# Patient Record
Sex: FEMALE | Race: WHITE | URBAN - METROPOLITAN AREA
[De-identification: names, ages, dates, MRNs, and addresses within clinical notes are randomized per-mention and may not be internally consistent; named-entity substitution may affect disease eponyms.]

---

## 2017-01-06 ENCOUNTER — TELEPHONE (OUTPATIENT)
Dept: FAMILY MEDICINE CLINIC | Age: 82
End: 2017-01-06

## 2017-01-26 ENCOUNTER — HOSPITAL ENCOUNTER (OUTPATIENT)
Dept: WOMENS IMAGING | Age: 82
Discharge: HOME | End: 2017-01-26
Attending: FAMILY MEDICINE | Admitting: FAMILY MEDICINE

## 2017-01-26 DIAGNOSIS — Z12.31 VISIT FOR SCREENING MAMMOGRAM: ICD-10-CM

## 2017-02-09 ENCOUNTER — OFFICE VISIT (OUTPATIENT)
Dept: FAMILY MEDICINE CLINIC | Age: 82
End: 2017-02-09

## 2017-02-09 VITALS
HEIGHT: 61 IN | BODY MASS INDEX: 27.56 KG/M2 | SYSTOLIC BLOOD PRESSURE: 144 MMHG | WEIGHT: 146 LBS | HEART RATE: 78 BPM | DIASTOLIC BLOOD PRESSURE: 62 MMHG

## 2017-02-09 DIAGNOSIS — N18.30 CKD (CHRONIC KIDNEY DISEASE), STAGE III (HCC): ICD-10-CM

## 2017-02-09 DIAGNOSIS — Z23 NEED FOR STREPTOCOCCUS PNEUMONIAE VACCINATION: ICD-10-CM

## 2017-02-09 DIAGNOSIS — I10 ESSENTIAL HYPERTENSION, BENIGN: Primary | ICD-10-CM

## 2017-02-09 DIAGNOSIS — E78.00 HYPERCHOLESTEREMIA: ICD-10-CM

## 2017-02-09 LAB
A/G RATIO: 1.6 (ref 1.1–2.2)
ALBUMIN SERPL-MCNC: 4.6 G/DL (ref 3.4–5)
ALP BLD-CCNC: 80 U/L (ref 40–129)
ALT SERPL-CCNC: 18 U/L (ref 10–40)
ANION GAP SERPL CALCULATED.3IONS-SCNC: 17 MMOL/L (ref 3–16)
AST SERPL-CCNC: 32 U/L (ref 15–37)
BILIRUB SERPL-MCNC: 1.7 MG/DL (ref 0–1)
BUN BLDV-MCNC: 21 MG/DL (ref 7–20)
CALCIUM SERPL-MCNC: 10.1 MG/DL (ref 8.3–10.6)
CHLORIDE BLD-SCNC: 95 MMOL/L (ref 99–110)
CHOLESTEROL, TOTAL: 251 MG/DL (ref 0–199)
CO2: 26 MMOL/L (ref 21–32)
CREAT SERPL-MCNC: 0.9 MG/DL (ref 0.6–1.2)
GFR AFRICAN AMERICAN: >60
GFR NON-AFRICAN AMERICAN: >60
GLOBULIN: 2.8 G/DL
GLUCOSE BLD-MCNC: 84 MG/DL (ref 70–99)
HDLC SERPL-MCNC: 86 MG/DL (ref 40–60)
LDL CHOLESTEROL CALCULATED: 141 MG/DL
POTASSIUM SERPL-SCNC: 4 MMOL/L (ref 3.5–5.1)
SODIUM BLD-SCNC: 138 MMOL/L (ref 136–145)
TOTAL PROTEIN: 7.4 G/DL (ref 6.4–8.2)
TRIGL SERPL-MCNC: 120 MG/DL (ref 0–150)
VLDLC SERPL CALC-MCNC: 24 MG/DL

## 2017-02-09 PROCEDURE — 1123F ACP DISCUSS/DSCN MKR DOCD: CPT | Performed by: FAMILY MEDICINE

## 2017-02-09 PROCEDURE — G8484 FLU IMMUNIZE NO ADMIN: HCPCS | Performed by: FAMILY MEDICINE

## 2017-02-09 PROCEDURE — 99214 OFFICE O/P EST MOD 30 MIN: CPT | Performed by: FAMILY MEDICINE

## 2017-02-09 PROCEDURE — 90732 PPSV23 VACC 2 YRS+ SUBQ/IM: CPT | Performed by: FAMILY MEDICINE

## 2017-02-09 PROCEDURE — 36415 COLL VENOUS BLD VENIPUNCTURE: CPT | Performed by: FAMILY MEDICINE

## 2017-02-09 PROCEDURE — G8420 CALC BMI NORM PARAMETERS: HCPCS | Performed by: FAMILY MEDICINE

## 2017-02-09 PROCEDURE — G8427 DOCREV CUR MEDS BY ELIG CLIN: HCPCS | Performed by: FAMILY MEDICINE

## 2017-02-09 PROCEDURE — 1090F PRES/ABSN URINE INCON ASSESS: CPT | Performed by: FAMILY MEDICINE

## 2017-02-09 PROCEDURE — G8400 PT W/DXA NO RESULTS DOC: HCPCS | Performed by: FAMILY MEDICINE

## 2017-02-09 PROCEDURE — 4040F PNEUMOC VAC/ADMIN/RCVD: CPT | Performed by: FAMILY MEDICINE

## 2017-02-09 PROCEDURE — 1036F TOBACCO NON-USER: CPT | Performed by: FAMILY MEDICINE

## 2017-02-09 PROCEDURE — 90471 IMMUNIZATION ADMIN: CPT | Performed by: FAMILY MEDICINE

## 2017-02-13 ENCOUNTER — TELEPHONE (OUTPATIENT)
Dept: FAMILY MEDICINE CLINIC | Age: 82
End: 2017-02-13

## 2017-02-13 DIAGNOSIS — E78.00 HYPERCHOLESTEREMIA: ICD-10-CM

## 2017-02-13 RX ORDER — SIMVASTATIN 20 MG
20 TABLET ORAL EVERY EVENING
Qty: 90 TABLET | Refills: 3 | Status: SHIPPED | OUTPATIENT
Start: 2017-02-13 | End: 2017-09-25 | Stop reason: SDUPTHER

## 2017-02-14 ENCOUNTER — TELEPHONE (OUTPATIENT)
Dept: FAMILY MEDICINE CLINIC | Age: 82
End: 2017-02-14

## 2017-03-28 ENCOUNTER — TELEPHONE (OUTPATIENT)
Dept: FAMILY MEDICINE CLINIC | Age: 82
End: 2017-03-28

## 2017-04-05 ENCOUNTER — OFFICE VISIT (OUTPATIENT)
Dept: ORTHOPEDIC SURGERY | Age: 82
End: 2017-04-05

## 2017-04-05 VITALS
HEART RATE: 77 BPM | HEIGHT: 61 IN | DIASTOLIC BLOOD PRESSURE: 77 MMHG | SYSTOLIC BLOOD PRESSURE: 127 MMHG | WEIGHT: 150 LBS | BODY MASS INDEX: 28.32 KG/M2

## 2017-04-05 DIAGNOSIS — M75.42 IMPINGEMENT SYNDROME OF LEFT SHOULDER: Primary | ICD-10-CM

## 2017-04-05 PROCEDURE — G8427 DOCREV CUR MEDS BY ELIG CLIN: HCPCS | Performed by: ORTHOPAEDIC SURGERY

## 2017-04-05 PROCEDURE — 73030 X-RAY EXAM OF SHOULDER: CPT | Performed by: ORTHOPAEDIC SURGERY

## 2017-04-05 PROCEDURE — G8420 CALC BMI NORM PARAMETERS: HCPCS | Performed by: ORTHOPAEDIC SURGERY

## 2017-04-05 PROCEDURE — 1090F PRES/ABSN URINE INCON ASSESS: CPT | Performed by: ORTHOPAEDIC SURGERY

## 2017-04-05 PROCEDURE — G8400 PT W/DXA NO RESULTS DOC: HCPCS | Performed by: ORTHOPAEDIC SURGERY

## 2017-04-05 PROCEDURE — 20610 DRAIN/INJ JOINT/BURSA W/O US: CPT | Performed by: ORTHOPAEDIC SURGERY

## 2017-04-05 PROCEDURE — 1036F TOBACCO NON-USER: CPT | Performed by: ORTHOPAEDIC SURGERY

## 2017-04-05 PROCEDURE — 1123F ACP DISCUSS/DSCN MKR DOCD: CPT | Performed by: ORTHOPAEDIC SURGERY

## 2017-04-05 PROCEDURE — 99214 OFFICE O/P EST MOD 30 MIN: CPT | Performed by: ORTHOPAEDIC SURGERY

## 2017-04-05 PROCEDURE — 4040F PNEUMOC VAC/ADMIN/RCVD: CPT | Performed by: ORTHOPAEDIC SURGERY

## 2017-04-21 ENCOUNTER — TELEPHONE (OUTPATIENT)
Dept: ORTHOPEDIC SURGERY | Age: 82
End: 2017-04-21

## 2017-04-21 DIAGNOSIS — M75.42 IMPINGEMENT SYNDROME OF LEFT SHOULDER: Primary | ICD-10-CM

## 2017-05-03 ENCOUNTER — HOSPITAL ENCOUNTER (OUTPATIENT)
Dept: PHYSICAL THERAPY | Age: 82
Discharge: HOME | End: 2017-05-31
Attending: ORTHOPAEDIC SURGERY | Admitting: ORTHOPAEDIC SURGERY

## 2017-05-03 ASSESSMENT — PAIN DESCRIPTION - DESCRIPTORS: DESCRIPTORS: ACHING;SHOOTING;SORE;SHARP;CONSTANT

## 2017-05-03 ASSESSMENT — PAIN DESCRIPTION - PROGRESSION: CLINICAL_PROGRESSION: NOT CHANGED

## 2017-05-03 ASSESSMENT — PAIN DESCRIPTION - ONSET: ONSET: ON-GOING

## 2017-05-03 ASSESSMENT — PAIN DESCRIPTION - PAIN TYPE: TYPE: ACUTE PAIN

## 2017-05-03 ASSESSMENT — PAIN DESCRIPTION - LOCATION: LOCATION: SHOULDER

## 2017-05-03 ASSESSMENT — PAIN SCALES - GENERAL: PAINLEVEL_OUTOF10: 7

## 2017-05-03 ASSESSMENT — PAIN DESCRIPTION - ORIENTATION: ORIENTATION: LEFT

## 2017-05-03 ASSESSMENT — PAIN DESCRIPTION - DIRECTION: RADIATING_TOWARDS: DOWN ARM

## 2017-05-03 ASSESSMENT — PAIN DESCRIPTION - FREQUENCY: FREQUENCY: CONTINUOUS

## 2017-05-12 ENCOUNTER — HOSPITAL ENCOUNTER (OUTPATIENT)
Dept: PHYSICAL THERAPY | Age: 82
Discharge: HOME | End: 2017-05-13
Admitting: ORTHOPAEDIC SURGERY

## 2017-05-16 ENCOUNTER — HOSPITAL ENCOUNTER (OUTPATIENT)
Dept: PHYSICAL THERAPY | Age: 82
Discharge: HOME | End: 2017-05-17
Admitting: ORTHOPAEDIC SURGERY

## 2017-05-17 ENCOUNTER — OFFICE VISIT (OUTPATIENT)
Dept: ORTHOPEDIC SURGERY | Age: 82
End: 2017-05-17

## 2017-05-17 VITALS
HEIGHT: 61 IN | HEART RATE: 82 BPM | DIASTOLIC BLOOD PRESSURE: 65 MMHG | RESPIRATION RATE: 16 BRPM | WEIGHT: 150 LBS | BODY MASS INDEX: 28.32 KG/M2 | SYSTOLIC BLOOD PRESSURE: 114 MMHG

## 2017-05-17 DIAGNOSIS — M75.41 ROTATOR CUFF IMPINGEMENT SYNDROME OF RIGHT SHOULDER: Primary | ICD-10-CM

## 2017-05-17 DIAGNOSIS — S40.011A CONTUSION OF RIGHT SHOULDER, INITIAL ENCOUNTER: ICD-10-CM

## 2017-05-17 PROCEDURE — 99214 OFFICE O/P EST MOD 30 MIN: CPT | Performed by: ORTHOPAEDIC SURGERY

## 2017-05-17 PROCEDURE — 20610 DRAIN/INJ JOINT/BURSA W/O US: CPT | Performed by: ORTHOPAEDIC SURGERY

## 2017-05-30 DIAGNOSIS — I10 ESSENTIAL HYPERTENSION, BENIGN: ICD-10-CM

## 2017-05-30 RX ORDER — LOSARTAN POTASSIUM AND HYDROCHLOROTHIAZIDE 12.5; 5 MG/1; MG/1
TABLET ORAL
Qty: 90 TABLET | Refills: 3 | Status: SHIPPED | OUTPATIENT
Start: 2017-05-30 | End: 2017-09-25 | Stop reason: SDUPTHER

## 2017-05-30 RX ORDER — AMLODIPINE BESYLATE 5 MG/1
TABLET ORAL
Qty: 90 TABLET | Refills: 3 | Status: SHIPPED | OUTPATIENT
Start: 2017-05-30 | End: 2017-09-25 | Stop reason: SDUPTHER

## 2017-06-12 ENCOUNTER — TELEPHONE (OUTPATIENT)
Dept: FAMILY MEDICINE CLINIC | Age: 82
End: 2017-06-12

## 2017-06-12 DIAGNOSIS — N39.0 URINARY TRACT INFECTION, SITE UNSPECIFIED: Primary | ICD-10-CM

## 2017-06-12 RX ORDER — SULFAMETHOXAZOLE AND TRIMETHOPRIM 800; 160 MG/1; MG/1
1 TABLET ORAL 2 TIMES DAILY
Qty: 14 TABLET | Refills: 0 | Status: SHIPPED | OUTPATIENT
Start: 2017-06-12 | End: 2017-06-19

## 2017-08-28 ENCOUNTER — OFFICE VISIT (OUTPATIENT)
Dept: FAMILY MEDICINE CLINIC | Age: 82
End: 2017-08-28

## 2017-08-28 VITALS
BODY MASS INDEX: 26.64 KG/M2 | HEIGHT: 61 IN | WEIGHT: 141.1 LBS | SYSTOLIC BLOOD PRESSURE: 131 MMHG | HEART RATE: 80 BPM | DIASTOLIC BLOOD PRESSURE: 78 MMHG

## 2017-08-28 DIAGNOSIS — E78.00 HYPERCHOLESTEREMIA: Primary | ICD-10-CM

## 2017-08-28 DIAGNOSIS — L57.0 ACTINIC KERATOSIS: ICD-10-CM

## 2017-08-28 DIAGNOSIS — I10 ESSENTIAL HYPERTENSION, BENIGN: ICD-10-CM

## 2017-08-28 LAB
A/G RATIO: 1.5 (ref 1.1–2.2)
ALBUMIN SERPL-MCNC: 4.5 G/DL (ref 3.4–5)
ALP BLD-CCNC: 70 U/L (ref 40–129)
ALT SERPL-CCNC: 16 U/L (ref 10–40)
ANION GAP SERPL CALCULATED.3IONS-SCNC: 18 MMOL/L (ref 3–16)
AST SERPL-CCNC: 27 U/L (ref 15–37)
BILIRUB SERPL-MCNC: 1.6 MG/DL (ref 0–1)
BUN BLDV-MCNC: 25 MG/DL (ref 7–20)
CALCIUM SERPL-MCNC: 10.2 MG/DL (ref 8.3–10.6)
CHLORIDE BLD-SCNC: 94 MMOL/L (ref 99–110)
CHOLESTEROL, TOTAL: 196 MG/DL (ref 0–199)
CO2: 26 MMOL/L (ref 21–32)
CREAT SERPL-MCNC: 0.9 MG/DL (ref 0.6–1.2)
GFR AFRICAN AMERICAN: >60
GFR NON-AFRICAN AMERICAN: >60
GLOBULIN: 3.1 G/DL
GLUCOSE BLD-MCNC: 89 MG/DL (ref 70–99)
HDLC SERPL-MCNC: 96 MG/DL (ref 40–60)
LDL CHOLESTEROL CALCULATED: 84 MG/DL
POTASSIUM SERPL-SCNC: 3.4 MMOL/L (ref 3.5–5.1)
SODIUM BLD-SCNC: 138 MMOL/L (ref 136–145)
TOTAL PROTEIN: 7.6 G/DL (ref 6.4–8.2)
TRIGL SERPL-MCNC: 80 MG/DL (ref 0–150)
VLDLC SERPL CALC-MCNC: 16 MG/DL

## 2017-08-28 PROCEDURE — G8400 PT W/DXA NO RESULTS DOC: HCPCS | Performed by: FAMILY MEDICINE

## 2017-08-28 PROCEDURE — 1123F ACP DISCUSS/DSCN MKR DOCD: CPT | Performed by: FAMILY MEDICINE

## 2017-08-28 PROCEDURE — 99214 OFFICE O/P EST MOD 30 MIN: CPT | Performed by: FAMILY MEDICINE

## 2017-08-28 PROCEDURE — G8427 DOCREV CUR MEDS BY ELIG CLIN: HCPCS | Performed by: FAMILY MEDICINE

## 2017-08-28 PROCEDURE — 17000 DESTRUCT PREMALG LESION: CPT | Performed by: FAMILY MEDICINE

## 2017-08-28 PROCEDURE — G8419 CALC BMI OUT NRM PARAM NOF/U: HCPCS | Performed by: FAMILY MEDICINE

## 2017-08-28 PROCEDURE — 4040F PNEUMOC VAC/ADMIN/RCVD: CPT | Performed by: FAMILY MEDICINE

## 2017-08-28 PROCEDURE — 36415 COLL VENOUS BLD VENIPUNCTURE: CPT | Performed by: FAMILY MEDICINE

## 2017-08-28 PROCEDURE — 1090F PRES/ABSN URINE INCON ASSESS: CPT | Performed by: FAMILY MEDICINE

## 2017-08-28 PROCEDURE — 1036F TOBACCO NON-USER: CPT | Performed by: FAMILY MEDICINE

## 2017-08-29 ENCOUNTER — TELEPHONE (OUTPATIENT)
Dept: FAMILY MEDICINE CLINIC | Age: 82
End: 2017-08-29

## 2017-08-30 ENCOUNTER — OFFICE VISIT (OUTPATIENT)
Dept: ORTHOPEDIC SURGERY | Age: 82
End: 2017-08-30

## 2017-08-30 VITALS
SYSTOLIC BLOOD PRESSURE: 136 MMHG | RESPIRATION RATE: 16 BRPM | HEART RATE: 78 BPM | HEIGHT: 61 IN | BODY MASS INDEX: 26.62 KG/M2 | DIASTOLIC BLOOD PRESSURE: 84 MMHG | WEIGHT: 141 LBS

## 2017-08-30 DIAGNOSIS — M75.41 ROTATOR CUFF IMPINGEMENT SYNDROME OF RIGHT SHOULDER: Primary | ICD-10-CM

## 2017-08-30 DIAGNOSIS — M75.121 COMPLETE TEAR OF RIGHT ROTATOR CUFF: ICD-10-CM

## 2017-08-30 PROCEDURE — 99214 OFFICE O/P EST MOD 30 MIN: CPT | Performed by: ORTHOPAEDIC SURGERY

## 2017-08-30 PROCEDURE — 20610 DRAIN/INJ JOINT/BURSA W/O US: CPT | Performed by: ORTHOPAEDIC SURGERY

## 2017-09-13 ENCOUNTER — TELEPHONE (OUTPATIENT)
Dept: FAMILY MEDICINE CLINIC | Age: 82
End: 2017-09-13

## 2017-09-14 ENCOUNTER — OFFICE VISIT (OUTPATIENT)
Dept: FAMILY MEDICINE CLINIC | Age: 82
End: 2017-09-14

## 2017-09-14 VITALS — HEART RATE: 88 BPM | RESPIRATION RATE: 18 BRPM

## 2017-09-14 DIAGNOSIS — B02.9 HERPES ZOSTER WITHOUT COMPLICATION: Primary | ICD-10-CM

## 2017-09-14 DIAGNOSIS — J01.90 ACUTE BACTERIAL SINUSITIS: ICD-10-CM

## 2017-09-14 DIAGNOSIS — B96.89 ACUTE BACTERIAL SINUSITIS: ICD-10-CM

## 2017-09-14 PROCEDURE — 99213 OFFICE O/P EST LOW 20 MIN: CPT | Performed by: FAMILY MEDICINE

## 2017-09-14 PROCEDURE — G8428 CUR MEDS NOT DOCUMENT: HCPCS | Performed by: FAMILY MEDICINE

## 2017-09-14 PROCEDURE — G8400 PT W/DXA NO RESULTS DOC: HCPCS | Performed by: FAMILY MEDICINE

## 2017-09-14 PROCEDURE — 1036F TOBACCO NON-USER: CPT | Performed by: FAMILY MEDICINE

## 2017-09-14 PROCEDURE — 1123F ACP DISCUSS/DSCN MKR DOCD: CPT | Performed by: FAMILY MEDICINE

## 2017-09-14 PROCEDURE — 4040F PNEUMOC VAC/ADMIN/RCVD: CPT | Performed by: FAMILY MEDICINE

## 2017-09-14 PROCEDURE — G8417 CALC BMI ABV UP PARAM F/U: HCPCS | Performed by: FAMILY MEDICINE

## 2017-09-14 PROCEDURE — 1090F PRES/ABSN URINE INCON ASSESS: CPT | Performed by: FAMILY MEDICINE

## 2017-09-14 RX ORDER — GABAPENTIN 600 MG/1
600 TABLET ORAL 3 TIMES DAILY
Qty: 90 TABLET | Refills: 0 | Status: SHIPPED | OUTPATIENT
Start: 2017-09-14 | End: 2017-12-01

## 2017-09-14 RX ORDER — METHYLPREDNISOLONE 4 MG/1
TABLET ORAL
Qty: 1 KIT | Refills: 0 | Status: SHIPPED | OUTPATIENT
Start: 2017-09-14 | End: 2017-12-01

## 2017-09-14 RX ORDER — ACYCLOVIR 800 MG/1
800 TABLET ORAL
Qty: 35 TABLET | Refills: 0 | Status: SHIPPED | OUTPATIENT
Start: 2017-09-14 | End: 2017-09-21

## 2017-09-14 RX ORDER — AMOXICILLIN AND CLAVULANATE POTASSIUM 875; 125 MG/1; MG/1
1 TABLET, FILM COATED ORAL 2 TIMES DAILY
Qty: 14 TABLET | Refills: 0 | Status: SHIPPED | OUTPATIENT
Start: 2017-09-14 | End: 2017-09-21

## 2017-09-25 ENCOUNTER — OFFICE VISIT (OUTPATIENT)
Dept: FAMILY MEDICINE CLINIC | Age: 82
End: 2017-09-25

## 2017-09-25 VITALS
DIASTOLIC BLOOD PRESSURE: 77 MMHG | BODY MASS INDEX: 26.62 KG/M2 | HEART RATE: 100 BPM | WEIGHT: 141 LBS | HEIGHT: 61 IN | SYSTOLIC BLOOD PRESSURE: 129 MMHG

## 2017-09-25 DIAGNOSIS — B02.23 POST-HERPETIC POLYNEUROPATHY: Primary | ICD-10-CM

## 2017-09-25 DIAGNOSIS — I10 ESSENTIAL HYPERTENSION, BENIGN: ICD-10-CM

## 2017-09-25 DIAGNOSIS — E78.00 HYPERCHOLESTEREMIA: ICD-10-CM

## 2017-09-25 PROCEDURE — 1036F TOBACCO NON-USER: CPT | Performed by: FAMILY MEDICINE

## 2017-09-25 PROCEDURE — G8427 DOCREV CUR MEDS BY ELIG CLIN: HCPCS | Performed by: FAMILY MEDICINE

## 2017-09-25 PROCEDURE — 1090F PRES/ABSN URINE INCON ASSESS: CPT | Performed by: FAMILY MEDICINE

## 2017-09-25 PROCEDURE — G8417 CALC BMI ABV UP PARAM F/U: HCPCS | Performed by: FAMILY MEDICINE

## 2017-09-25 PROCEDURE — G8400 PT W/DXA NO RESULTS DOC: HCPCS | Performed by: FAMILY MEDICINE

## 2017-09-25 PROCEDURE — 99213 OFFICE O/P EST LOW 20 MIN: CPT | Performed by: FAMILY MEDICINE

## 2017-09-25 PROCEDURE — 4040F PNEUMOC VAC/ADMIN/RCVD: CPT | Performed by: FAMILY MEDICINE

## 2017-09-25 PROCEDURE — 1123F ACP DISCUSS/DSCN MKR DOCD: CPT | Performed by: FAMILY MEDICINE

## 2017-09-26 RX ORDER — AMLODIPINE BESYLATE 5 MG/1
TABLET ORAL
Qty: 90 TABLET | Refills: 3 | Status: SHIPPED | OUTPATIENT
Start: 2017-09-26 | End: 2018-10-08 | Stop reason: SDUPTHER

## 2017-09-26 RX ORDER — LOSARTAN POTASSIUM AND HYDROCHLOROTHIAZIDE 12.5; 5 MG/1; MG/1
TABLET ORAL
Qty: 90 TABLET | Refills: 3 | Status: SHIPPED | OUTPATIENT
Start: 2017-09-26 | End: 2018-10-08 | Stop reason: SDUPTHER

## 2017-09-26 RX ORDER — SIMVASTATIN 20 MG
20 TABLET ORAL EVERY EVENING
Qty: 90 TABLET | Refills: 3 | Status: SHIPPED | OUTPATIENT
Start: 2017-09-26 | End: 2018-10-08 | Stop reason: SDUPTHER

## 2017-12-01 ENCOUNTER — OFFICE VISIT (OUTPATIENT)
Dept: FAMILY MEDICINE CLINIC | Age: 82
End: 2017-12-01

## 2017-12-01 VITALS
BODY MASS INDEX: 26.62 KG/M2 | HEART RATE: 83 BPM | DIASTOLIC BLOOD PRESSURE: 82 MMHG | SYSTOLIC BLOOD PRESSURE: 132 MMHG | WEIGHT: 141 LBS | HEIGHT: 61 IN

## 2017-12-01 DIAGNOSIS — E78.00 HYPERCHOLESTEREMIA: Primary | ICD-10-CM

## 2017-12-01 DIAGNOSIS — J30.1 CHRONIC SEASONAL ALLERGIC RHINITIS DUE TO POLLEN: ICD-10-CM

## 2017-12-01 DIAGNOSIS — I10 ESSENTIAL HYPERTENSION, BENIGN: ICD-10-CM

## 2017-12-01 PROCEDURE — 4040F PNEUMOC VAC/ADMIN/RCVD: CPT | Performed by: FAMILY MEDICINE

## 2017-12-01 PROCEDURE — G8484 FLU IMMUNIZE NO ADMIN: HCPCS | Performed by: FAMILY MEDICINE

## 2017-12-01 PROCEDURE — 1090F PRES/ABSN URINE INCON ASSESS: CPT | Performed by: FAMILY MEDICINE

## 2017-12-01 PROCEDURE — 99214 OFFICE O/P EST MOD 30 MIN: CPT | Performed by: FAMILY MEDICINE

## 2017-12-01 PROCEDURE — 1123F ACP DISCUSS/DSCN MKR DOCD: CPT | Performed by: FAMILY MEDICINE

## 2017-12-01 PROCEDURE — G8400 PT W/DXA NO RESULTS DOC: HCPCS | Performed by: FAMILY MEDICINE

## 2017-12-01 PROCEDURE — G8417 CALC BMI ABV UP PARAM F/U: HCPCS | Performed by: FAMILY MEDICINE

## 2017-12-01 PROCEDURE — 1036F TOBACCO NON-USER: CPT | Performed by: FAMILY MEDICINE

## 2017-12-01 PROCEDURE — G8427 DOCREV CUR MEDS BY ELIG CLIN: HCPCS | Performed by: FAMILY MEDICINE

## 2017-12-01 NOTE — PROGRESS NOTES
lb (64 kg)   08/30/17 141 lb (64 kg)       WDWN in NAD  Lungs: CTAB BS Equal and Easy, no accessory muscle use  CV: RRR w/o M,R,G, PP2+, no edema  Abd:  BS+, S, ND, NT, no hsm  Psych: Judgement and insight are intact, Nl Speech and motor activity, no KALA, no FOI, dressed casually in street clothes          Chemistry        Component Value Date/Time     08/28/2017 1314    K 3.4 (L) 08/28/2017 1314    CL 94 (L) 08/28/2017 1314    CO2 26 08/28/2017 1314    BUN 25 (H) 08/28/2017 1314    CREATININE 0.9 08/28/2017 1314        Component Value Date/Time    CALCIUM 10.2 08/28/2017 1314    ALKPHOS 70 08/28/2017 1314    AST 27 08/28/2017 1314    ALT 16 08/28/2017 1314    BILITOT 1.6 (H) 08/28/2017 1314          Lab Results   Component Value Date    WBC 7.1 07/15/2015    HGB 13.0 07/15/2015    HCT 39.2 07/15/2015    MCV 96.4 07/15/2015     07/15/2015     No results found for: LABA1C  No results found for: EAG  No results found for: LABA1C  No components found for: CHLPL  Lab Results   Component Value Date    TRIG 80 08/28/2017    TRIG 120 02/09/2017    TRIG 106 01/21/2016     Lab Results   Component Value Date    HDL 96 (H) 08/28/2017    HDL 86 (H) 02/09/2017    HDL 83 (H) 01/21/2016     Lab Results   Component Value Date    LDLCALC 84 08/28/2017    LDLCALC 141 (H) 02/09/2017    LDLCALC 91 01/21/2016     Lab Results   Component Value Date    LABVLDL 16 08/28/2017    LABVLDL 24 02/09/2017    LABVLDL 21 01/21/2016         Assessment   Plan     1. Hypercholesteremia  Controlled:  Chronic and persistent: continue meds and follow. 2. Essential hypertension, benign  Controlled:   Chronic and persistent: continue meds and follow. 3. Chronic seasonal allergic rhinitis due to pollen  Stable, continue to montior and f/u prn. Discussed use, benefit, and side effects of prescribed medications. Barriers to medication compliance addressed. All patient questions answered. Pt voiced understanding.        RTC in 3

## 2018-01-18 ENCOUNTER — HOSPITAL ENCOUNTER (OUTPATIENT)
Dept: WOMENS IMAGING | Age: 83
Discharge: HOME | End: 2018-01-18
Attending: FAMILY MEDICINE | Admitting: FAMILY MEDICINE

## 2018-01-18 DIAGNOSIS — Z12.31 VISIT FOR SCREENING MAMMOGRAM: ICD-10-CM

## 2018-02-02 ENCOUNTER — TELEPHONE (OUTPATIENT)
Dept: FAMILY MEDICINE CLINIC | Age: 83
End: 2018-02-02

## 2018-02-02 NOTE — TELEPHONE ENCOUNTER
Pt called said that there is an issue with [employer] and her simvastatin Rx. Pls call patient back so that she can discuss.

## 2018-02-20 ENCOUNTER — TELEPHONE (OUTPATIENT)
Dept: FAMILY MEDICINE CLINIC | Age: 83
End: 2018-02-20

## 2018-02-27 NOTE — TELEPHONE ENCOUNTER
Dr. Rodriguez Blind: This patient has an upcoming appointment with you for Hyperlipidemia. In planning for that visit I have completed the following pre-visit planning:     Pre-Visit Planning Checklist:  Patient contacted: yes  Verified patient by name and date of birth: yes    Health Maintenance items reviewed:    No pre-visit planning health maintenance topics to review at this time    Labs and procedures pended:     Labs and procedures discussed with patient: yes  Reminded patient to check with their insurance company about coverage for lab tests and lab location: yes    Preliminary Medication Reconciliation: was performed. Reminded patient to bring medications to appointment: no    Reminded patient to arrive early: yes    Other notes: Patient has new insurance since retiring. Needs help in finding lab. [facility] not covered and costs more than she can pay    Please complete the med-reconciliation and sign the appropriate labs as soon as possible.       Dominique Hassan, [address] [facility]  Pre-Services Specialist

## 2018-03-01 ENCOUNTER — OFFICE VISIT (OUTPATIENT)
Dept: FAMILY MEDICINE CLINIC | Age: 83
End: 2018-03-01

## 2018-03-01 VITALS
DIASTOLIC BLOOD PRESSURE: 76 MMHG | WEIGHT: 139 LBS | HEART RATE: 79 BPM | BODY MASS INDEX: 25.58 KG/M2 | SYSTOLIC BLOOD PRESSURE: 127 MMHG | HEIGHT: 62 IN

## 2018-03-01 DIAGNOSIS — I10 ESSENTIAL HYPERTENSION, BENIGN: Primary | ICD-10-CM

## 2018-03-01 DIAGNOSIS — E78.00 HYPERCHOLESTEREMIA: ICD-10-CM

## 2018-03-01 PROCEDURE — G8417 CALC BMI ABV UP PARAM F/U: HCPCS | Performed by: FAMILY MEDICINE

## 2018-03-01 PROCEDURE — G8484 FLU IMMUNIZE NO ADMIN: HCPCS | Performed by: FAMILY MEDICINE

## 2018-03-01 PROCEDURE — 1090F PRES/ABSN URINE INCON ASSESS: CPT | Performed by: FAMILY MEDICINE

## 2018-03-01 PROCEDURE — 99214 OFFICE O/P EST MOD 30 MIN: CPT | Performed by: FAMILY MEDICINE

## 2018-03-01 PROCEDURE — G8427 DOCREV CUR MEDS BY ELIG CLIN: HCPCS | Performed by: FAMILY MEDICINE

## 2018-03-01 PROCEDURE — 1036F TOBACCO NON-USER: CPT | Performed by: FAMILY MEDICINE

## 2018-03-01 PROCEDURE — G8400 PT W/DXA NO RESULTS DOC: HCPCS | Performed by: FAMILY MEDICINE

## 2018-03-01 PROCEDURE — 4040F PNEUMOC VAC/ADMIN/RCVD: CPT | Performed by: FAMILY MEDICINE

## 2018-03-01 PROCEDURE — 1123F ACP DISCUSS/DSCN MKR DOCD: CPT | Performed by: FAMILY MEDICINE

## 2018-03-01 ASSESSMENT — PATIENT HEALTH QUESTIONNAIRE - PHQ9
SUM OF ALL RESPONSES TO PHQ QUESTIONS 1-9: 0
1. LITTLE INTEREST OR PLEASURE IN DOING THINGS: 0
2. FEELING DOWN, DEPRESSED OR HOPELESS: 0
SUM OF ALL RESPONSES TO PHQ9 QUESTIONS 1 & 2: 0

## 2018-03-01 ASSESSMENT — ENCOUNTER SYMPTOMS
NAUSEA: 0
SHORTNESS OF BREATH: 0
CONSTIPATION: 0
DIARRHEA: 0
ABDOMINAL PAIN: 0
VOMITING: 0
COUGH: 0

## 2018-03-12 ENCOUNTER — NURSE ONLY (OUTPATIENT)
Dept: FAMILY MEDICINE CLINIC | Age: 83
End: 2018-03-12

## 2018-03-12 DIAGNOSIS — I10 ESSENTIAL HYPERTENSION, BENIGN: Primary | ICD-10-CM

## 2018-03-12 LAB
A/G RATIO: 1.6 (ref 1.1–2.2)
ALBUMIN SERPL-MCNC: 4.5 G/DL (ref 3.4–5)
ALP BLD-CCNC: 67 U/L (ref 40–129)
ALT SERPL-CCNC: 15 U/L (ref 10–40)
ANION GAP SERPL CALCULATED.3IONS-SCNC: 13 MMOL/L (ref 3–16)
AST SERPL-CCNC: 26 U/L (ref 15–37)
BILIRUB SERPL-MCNC: 1.4 MG/DL (ref 0–1)
BUN BLDV-MCNC: 22 MG/DL (ref 7–20)
CALCIUM SERPL-MCNC: 9.8 MG/DL (ref 8.3–10.6)
CHLORIDE BLD-SCNC: 95 MMOL/L (ref 99–110)
CO2: 29 MMOL/L (ref 21–32)
CREAT SERPL-MCNC: 1 MG/DL (ref 0.6–1.2)
GFR AFRICAN AMERICAN: >60
GFR NON-AFRICAN AMERICAN: 53
GLOBULIN: 2.9 G/DL
GLUCOSE BLD-MCNC: 117 MG/DL (ref 70–99)
POTASSIUM SERPL-SCNC: 4.1 MMOL/L (ref 3.5–5.1)
SODIUM BLD-SCNC: 137 MMOL/L (ref 136–145)
TOTAL PROTEIN: 7.4 G/DL (ref 6.4–8.2)

## 2018-03-13 NOTE — PROGRESS NOTES
Call Results (labs or images) were stable. Keep follow up as suggested. Ask patient to sign up for MyChart.
Authorizing Provider   simvastatin (ZOCOR) 20 MG tablet Take 1 tablet by mouth every evening Yes Cindia Gaucher, MD   amLODIPine (NORVASC) 5 MG tablet TAKE ONE TABLET BY MOUTH ONE TIME A DAY Yes Cindia Gaucher, MD   losartan-hydrochlorothiazide (HYZAAR) 50-12.5 MG per tablet TAKE ONE TABLET BY MOUTH ONE TIME A DAY Yes Cindia Gaucher, MD   COENZYME Q-10 PO Take by mouth Yes Historical Provider, MD   Cholecalciferol (VITAMIN D PO) Take 1,000 Units by mouth daily. Yes Historical Provider, MD     Allergies   Allergen Reactions    Morphine      Vomiting      Vicodin [Hydrocodone-Acetaminophen]      Vomiting/nausea    Zetia [Ezetimibe] Itching    Lipitor [Atorvastatin] Other (See Comments)     myalgia       OBJECTIVE:    /76   Pulse 79   Ht 5' 2\" (1.575 m)   Wt 139 lb (63 kg)   BMI 25.42 kg/m²   BP Readings from Last 2 Encounters:   03/01/18 127/76   12/01/17 132/82     Wt Readings from Last 3 Encounters:   03/01/18 139 lb (63 kg)   12/01/17 141 lb (64 kg)   09/25/17 141 lb (64 kg)       Physical Exam   Constitutional: She appears well-developed and well-nourished. Cardiovascular: Normal rate and regular rhythm. Exam reveals no gallop and no friction rub. No murmur heard. Pulmonary/Chest: Effort normal and breath sounds normal. She has no wheezes. She has no rales. Abdominal: Soft. Bowel sounds are normal. She exhibits no distension and no mass. There is no tenderness. Skin: Skin is warm and dry. No rash noted. LDL Calculated (mg/dL)   Date Value   08/28/2017 84       ASSESSMENT/PLAN:    1. Essential hypertension, benign  Controlled: Appears stable. We will continue current management and monitor for adverse reaction and disease progression. Follow-up as noted below    - Comprehensive Metabolic Panel    2. Hypercholesteremia  Controlled: Appears stable. We will continue current management and monitor for adverse reaction and disease progression.   Follow-up as noted below    -

## 2018-04-06 ENCOUNTER — TELEPHONE (OUTPATIENT)
Dept: FAMILY MEDICINE CLINIC | Age: 83
End: 2018-04-06

## 2018-04-06 RX ORDER — MECLIZINE HCL 12.5 MG/1
12.5 TABLET ORAL 3 TIMES DAILY PRN
Qty: 20 TABLET | Refills: 0 | Status: SHIPPED | OUTPATIENT
Start: 2018-04-06 | End: 2018-04-16

## 2018-04-11 ENCOUNTER — OFFICE VISIT (OUTPATIENT)
Dept: ORTHOPEDIC SURGERY | Age: 83
End: 2018-04-11

## 2018-04-11 VITALS
BODY MASS INDEX: 25.58 KG/M2 | HEART RATE: 86 BPM | DIASTOLIC BLOOD PRESSURE: 75 MMHG | HEIGHT: 62 IN | RESPIRATION RATE: 16 BRPM | WEIGHT: 139 LBS | SYSTOLIC BLOOD PRESSURE: 107 MMHG

## 2018-04-11 DIAGNOSIS — M75.42 IMPINGEMENT SYNDROME OF LEFT SHOULDER: ICD-10-CM

## 2018-04-11 DIAGNOSIS — M75.41 ROTATOR CUFF IMPINGEMENT SYNDROME OF RIGHT SHOULDER: Primary | ICD-10-CM

## 2018-04-11 PROCEDURE — 20610 DRAIN/INJ JOINT/BURSA W/O US: CPT | Performed by: ORTHOPAEDIC SURGERY

## 2018-04-11 PROCEDURE — 99214 OFFICE O/P EST MOD 30 MIN: CPT | Performed by: ORTHOPAEDIC SURGERY

## 2018-06-07 ENCOUNTER — OFFICE VISIT (OUTPATIENT)
Dept: FAMILY MEDICINE CLINIC | Age: 83
End: 2018-06-07

## 2018-06-07 VITALS
WEIGHT: 139 LBS | HEART RATE: 85 BPM | BODY MASS INDEX: 25.58 KG/M2 | SYSTOLIC BLOOD PRESSURE: 122 MMHG | DIASTOLIC BLOOD PRESSURE: 78 MMHG | HEIGHT: 62 IN

## 2018-06-07 DIAGNOSIS — E78.00 HYPERCHOLESTEREMIA: Primary | ICD-10-CM

## 2018-06-07 DIAGNOSIS — I10 ESSENTIAL HYPERTENSION, BENIGN: ICD-10-CM

## 2018-06-07 DIAGNOSIS — R00.2 PALPITATION: ICD-10-CM

## 2018-06-07 DIAGNOSIS — R07.9 CHEST PAIN, UNSPECIFIED TYPE: ICD-10-CM

## 2018-06-07 DIAGNOSIS — N18.30 CKD (CHRONIC KIDNEY DISEASE), STAGE III (HCC): ICD-10-CM

## 2018-06-07 PROBLEM — M75.42 IMPINGEMENT SYNDROME OF LEFT SHOULDER: Status: RESOLVED | Noted: 2017-04-05 | Resolved: 2018-06-07

## 2018-06-07 LAB
A/G RATIO: 1.7 (ref 1.1–2.2)
ALBUMIN SERPL-MCNC: 4.8 G/DL (ref 3.4–5)
ALP BLD-CCNC: 67 U/L (ref 40–129)
ALT SERPL-CCNC: 15 U/L (ref 10–40)
ANION GAP SERPL CALCULATED.3IONS-SCNC: 14 MMOL/L (ref 3–16)
AST SERPL-CCNC: 26 U/L (ref 15–37)
BASOPHILS ABSOLUTE: 0 K/UL (ref 0–0.2)
BASOPHILS RELATIVE PERCENT: 0.3 %
BILIRUB SERPL-MCNC: 1.5 MG/DL (ref 0–1)
BUN BLDV-MCNC: 19 MG/DL (ref 7–20)
CALCIUM SERPL-MCNC: 10.5 MG/DL (ref 8.3–10.6)
CHLORIDE BLD-SCNC: 97 MMOL/L (ref 99–110)
CO2: 31 MMOL/L (ref 21–32)
CREAT SERPL-MCNC: 0.9 MG/DL (ref 0.6–1.2)
EOSINOPHILS ABSOLUTE: 0.2 K/UL (ref 0–0.6)
EOSINOPHILS RELATIVE PERCENT: 3 %
GFR AFRICAN AMERICAN: >60
GFR NON-AFRICAN AMERICAN: >60
GLOBULIN: 2.8 G/DL
GLUCOSE BLD-MCNC: 103 MG/DL (ref 70–99)
HCT VFR BLD CALC: 43.4 % (ref 36–48)
HEMOGLOBIN: 14.9 G/DL (ref 12–16)
LYMPHOCYTES ABSOLUTE: 1.9 K/UL (ref 1–5.1)
LYMPHOCYTES RELATIVE PERCENT: 30.6 %
MCH RBC QN AUTO: 32.3 PG (ref 26–34)
MCHC RBC AUTO-ENTMCNC: 34.3 G/DL (ref 31–36)
MCV RBC AUTO: 93.9 FL (ref 80–100)
MONOCYTES ABSOLUTE: 0.5 K/UL (ref 0–1.3)
MONOCYTES RELATIVE PERCENT: 8.1 %
NEUTROPHILS ABSOLUTE: 3.5 K/UL (ref 1.7–7.7)
NEUTROPHILS RELATIVE PERCENT: 58 %
PDW BLD-RTO: 13.7 % (ref 12.4–15.4)
PLATELET # BLD: 302 K/UL (ref 135–450)
PMV BLD AUTO: 8.7 FL (ref 5–10.5)
POTASSIUM SERPL-SCNC: 3.9 MMOL/L (ref 3.5–5.1)
RBC # BLD: 4.62 M/UL (ref 4–5.2)
SODIUM BLD-SCNC: 142 MMOL/L (ref 136–145)
T4 FREE: 1.2 NG/DL (ref 0.9–1.8)
TOTAL PROTEIN: 7.6 G/DL (ref 6.4–8.2)
TSH REFLEX: 5.49 UIU/ML (ref 0.27–4.2)
WBC # BLD: 6.1 K/UL (ref 4–11)

## 2018-06-07 PROCEDURE — 93000 ELECTROCARDIOGRAM COMPLETE: CPT | Performed by: FAMILY MEDICINE

## 2018-06-07 PROCEDURE — 1090F PRES/ABSN URINE INCON ASSESS: CPT | Performed by: FAMILY MEDICINE

## 2018-06-07 PROCEDURE — 36415 COLL VENOUS BLD VENIPUNCTURE: CPT | Performed by: FAMILY MEDICINE

## 2018-06-07 PROCEDURE — G8400 PT W/DXA NO RESULTS DOC: HCPCS | Performed by: FAMILY MEDICINE

## 2018-06-07 PROCEDURE — G8427 DOCREV CUR MEDS BY ELIG CLIN: HCPCS | Performed by: FAMILY MEDICINE

## 2018-06-07 PROCEDURE — 4040F PNEUMOC VAC/ADMIN/RCVD: CPT | Performed by: FAMILY MEDICINE

## 2018-06-07 PROCEDURE — 1123F ACP DISCUSS/DSCN MKR DOCD: CPT | Performed by: FAMILY MEDICINE

## 2018-06-07 PROCEDURE — 99214 OFFICE O/P EST MOD 30 MIN: CPT | Performed by: FAMILY MEDICINE

## 2018-06-07 PROCEDURE — 1036F TOBACCO NON-USER: CPT | Performed by: FAMILY MEDICINE

## 2018-06-07 PROCEDURE — G8417 CALC BMI ABV UP PARAM F/U: HCPCS | Performed by: FAMILY MEDICINE

## 2018-06-07 ASSESSMENT — ENCOUNTER SYMPTOMS
VOMITING: 0
CONSTIPATION: 0
COUGH: 0
DIARRHEA: 0
NAUSEA: 1
ABDOMINAL PAIN: 0
SHORTNESS OF BREATH: 0

## 2018-09-24 ENCOUNTER — OFFICE VISIT (OUTPATIENT)
Dept: FAMILY MEDICINE CLINIC | Age: 83
End: 2018-09-24
Payer: MEDICARE

## 2018-09-24 VITALS
DIASTOLIC BLOOD PRESSURE: 74 MMHG | SYSTOLIC BLOOD PRESSURE: 130 MMHG | WEIGHT: 139 LBS | BODY MASS INDEX: 25.58 KG/M2 | HEIGHT: 62 IN | HEART RATE: 75 BPM

## 2018-09-24 DIAGNOSIS — Z23 NEED FOR VACCINATION: ICD-10-CM

## 2018-09-24 DIAGNOSIS — E78.00 HYPERCHOLESTEREMIA: ICD-10-CM

## 2018-09-24 DIAGNOSIS — N18.30 CKD (CHRONIC KIDNEY DISEASE), STAGE III (HCC): ICD-10-CM

## 2018-09-24 DIAGNOSIS — I10 ESSENTIAL HYPERTENSION, BENIGN: Primary | ICD-10-CM

## 2018-09-24 PROCEDURE — G8427 DOCREV CUR MEDS BY ELIG CLIN: HCPCS | Performed by: FAMILY MEDICINE

## 2018-09-24 PROCEDURE — G0008 ADMIN INFLUENZA VIRUS VAC: HCPCS | Performed by: FAMILY MEDICINE

## 2018-09-24 PROCEDURE — 99214 OFFICE O/P EST MOD 30 MIN: CPT | Performed by: FAMILY MEDICINE

## 2018-09-24 PROCEDURE — G8400 PT W/DXA NO RESULTS DOC: HCPCS | Performed by: FAMILY MEDICINE

## 2018-09-24 PROCEDURE — G8417 CALC BMI ABV UP PARAM F/U: HCPCS | Performed by: FAMILY MEDICINE

## 2018-09-24 PROCEDURE — 4040F PNEUMOC VAC/ADMIN/RCVD: CPT | Performed by: FAMILY MEDICINE

## 2018-09-24 PROCEDURE — 1123F ACP DISCUSS/DSCN MKR DOCD: CPT | Performed by: FAMILY MEDICINE

## 2018-09-24 PROCEDURE — 1036F TOBACCO NON-USER: CPT | Performed by: FAMILY MEDICINE

## 2018-09-24 PROCEDURE — 36415 COLL VENOUS BLD VENIPUNCTURE: CPT | Performed by: FAMILY MEDICINE

## 2018-09-24 PROCEDURE — 90682 RIV4 VACC RECOMBINANT DNA IM: CPT | Performed by: FAMILY MEDICINE

## 2018-09-24 PROCEDURE — 1101F PT FALLS ASSESS-DOCD LE1/YR: CPT | Performed by: FAMILY MEDICINE

## 2018-09-24 PROCEDURE — 1090F PRES/ABSN URINE INCON ASSESS: CPT | Performed by: FAMILY MEDICINE

## 2018-09-24 ASSESSMENT — ENCOUNTER SYMPTOMS
CONSTIPATION: 0
VOMITING: 0
DIARRHEA: 0
ABDOMINAL PAIN: 0
SHORTNESS OF BREATH: 0
NAUSEA: 0
COUGH: 0

## 2018-09-24 NOTE — PROGRESS NOTES
Chief Complaint   Patient presents with    Hypertension    Hyperlipidemia    Chronic Kidney Disease         HPI:  Moses Rhodes is a 82 y.o. (: 1935) here today   for multiple medical problems. She is compliant with her blood pressure medications  without Lightheadedness, Urinary Frequency, Edema and Sedation  She is not checking Home Blood Pressures        She is compliant with her cholesterol medication without myalgia and abdominal pain       She has a chronic history of kidney disease and she drinks a lot of water about 64oz in total.     She is curious about foods, what are ok and what are not ok. Review of Systems   Constitutional: Negative for chills and fever. Respiratory: Negative for cough and shortness of breath. Cardiovascular: Negative for chest pain and palpitations. Gastrointestinal: Negative for abdominal pain, constipation, diarrhea, nausea and vomiting. Endocrine: Negative for polyuria. Genitourinary: Negative for dysuria.        Past Medical History:   Diagnosis Date    Advance directive discussed with patient     Asked to bring in:  tells me she is DNR    Chronic kidney disease (CKD), stage III (moderate)     CKD (chronic kidney disease), stage III 2016    Gout     HTN (hypertension)     Hypercholesteremia      Family History   Problem Relation Age of Onset    Cirrhosis Mother         Blood transfusion: Hep C    Diabetes Mother     Hypertension Mother     Emphysema Father              Social History     Social History    Marital status: Single     Spouse name: Daughter Tez Gonsales    Number of children: 3    Years of education: N/A     Occupational History    cook      Social History Main Topics    Smoking status: Never Smoker    Smokeless tobacco: Never Used    Alcohol use Yes      Comment: Wine nightly    Drug use: No    Sexual activity: Not on file     Other Topics Concern    Not on file     Social History Narrative    No

## 2018-09-25 LAB
A/G RATIO: 1.8 (ref 1.1–2.2)
ALBUMIN SERPL-MCNC: 4.6 G/DL (ref 3.4–5)
ALP BLD-CCNC: 68 U/L (ref 40–129)
ALT SERPL-CCNC: 15 U/L (ref 10–40)
ANION GAP SERPL CALCULATED.3IONS-SCNC: 15 MMOL/L (ref 3–16)
AST SERPL-CCNC: 29 U/L (ref 15–37)
BILIRUB SERPL-MCNC: 1.3 MG/DL (ref 0–1)
BUN BLDV-MCNC: 16 MG/DL (ref 7–20)
CALCIUM SERPL-MCNC: 10.4 MG/DL (ref 8.3–10.6)
CHLORIDE BLD-SCNC: 96 MMOL/L (ref 99–110)
CHOLESTEROL, TOTAL: 179 MG/DL (ref 0–199)
CO2: 28 MMOL/L (ref 21–32)
CREAT SERPL-MCNC: 0.9 MG/DL (ref 0.6–1.2)
GFR AFRICAN AMERICAN: >60
GFR NON-AFRICAN AMERICAN: >60
GLOBULIN: 2.6 G/DL
GLUCOSE BLD-MCNC: 91 MG/DL (ref 70–99)
HDLC SERPL-MCNC: 81 MG/DL (ref 40–60)
LDL CHOLESTEROL CALCULATED: 85 MG/DL
POTASSIUM SERPL-SCNC: 3.6 MMOL/L (ref 3.5–5.1)
SODIUM BLD-SCNC: 139 MMOL/L (ref 136–145)
TOTAL PROTEIN: 7.2 G/DL (ref 6.4–8.2)
TRIGL SERPL-MCNC: 63 MG/DL (ref 0–150)
VLDLC SERPL CALC-MCNC: 13 MG/DL

## 2018-10-01 ENCOUNTER — HOSPITAL ENCOUNTER (OUTPATIENT)
Dept: WOMENS IMAGING | Age: 83
Discharge: HOME | End: 2018-10-01
Payer: MEDICARE

## 2018-10-01 DIAGNOSIS — Z12.31 VISIT FOR SCREENING MAMMOGRAM: ICD-10-CM

## 2018-10-01 PROCEDURE — 77063 BREAST TOMOSYNTHESIS BI: CPT

## 2018-10-08 DIAGNOSIS — I10 ESSENTIAL HYPERTENSION, BENIGN: ICD-10-CM

## 2018-10-08 DIAGNOSIS — E78.00 HYPERCHOLESTEREMIA: ICD-10-CM

## 2018-10-08 RX ORDER — SIMVASTATIN 20 MG
TABLET ORAL
Qty: 90 TABLET | Refills: 3 | Status: SHIPPED | OUTPATIENT
Start: 2018-10-08 | End: 2019-08-15

## 2018-10-08 RX ORDER — LOSARTAN POTASSIUM AND HYDROCHLOROTHIAZIDE 12.5; 5 MG/1; MG/1
TABLET ORAL
Qty: 90 TABLET | Refills: 3 | Status: SHIPPED | OUTPATIENT
Start: 2018-10-08 | End: 2019-08-15

## 2018-10-08 RX ORDER — AMLODIPINE BESYLATE 5 MG/1
TABLET ORAL
Qty: 90 TABLET | Refills: 3 | Status: SHIPPED | OUTPATIENT
Start: 2018-10-08 | End: 2019-05-23 | Stop reason: SDUPTHER

## 2018-10-26 ENCOUNTER — OFFICE VISIT (OUTPATIENT)
Dept: ORTHOPEDIC SURGERY | Age: 83
End: 2018-10-26
Payer: COMMERCIAL

## 2018-10-26 VITALS
SYSTOLIC BLOOD PRESSURE: 128 MMHG | WEIGHT: 139 LBS | HEIGHT: 62 IN | DIASTOLIC BLOOD PRESSURE: 74 MMHG | HEART RATE: 71 BPM | BODY MASS INDEX: 25.58 KG/M2

## 2018-10-26 DIAGNOSIS — M75.41 IMPINGEMENT SYNDROME OF RIGHT SHOULDER: Primary | ICD-10-CM

## 2018-10-26 PROCEDURE — 20610 DRAIN/INJ JOINT/BURSA W/O US: CPT | Performed by: ORTHOPAEDIC SURGERY

## 2018-10-26 PROCEDURE — 99213 OFFICE O/P EST LOW 20 MIN: CPT | Performed by: ORTHOPAEDIC SURGERY

## 2018-10-27 PROBLEM — M75.41 IMPINGEMENT SYNDROME OF RIGHT SHOULDER: Status: ACTIVE | Noted: 2018-10-27

## 2018-10-27 NOTE — PROGRESS NOTES
Comment: Wine nightly    Drug use: No    Sexual activity: Not on file     Other Topics Concern    Not on file     Social History Narrative    No narrative on file       Family History   Problem Relation Age of Onset    Cirrhosis Mother         Blood transfusion: Hep C    Diabetes Mother     Hypertension Mother    Vika Bull Emphysema Father                Current Outpatient Prescriptions on File Prior to Visit   Medication Sig Dispense Refill    losartan-hydrochlorothiazide (HYZAAR) 50-12.5 MG per tablet TAKE ONE TABLET BY MOUTH ONE TIME A DAY 90 tablet 3    simvastatin (ZOCOR) 20 MG tablet TAKE 1 TABLET EVERY EVENING 90 tablet 3    amLODIPine (NORVASC) 5 MG tablet TAKE ONE TABLET BY MOUTH ONE TIME A DAY 90 tablet 3    COENZYME Q-10 PO Take by mouth      Cholecalciferol (VITAMIN D PO) Take 1,000 Units by mouth daily. No current facility-administered medications on file prior to visit. Pertinent items are noted in HPI  Review of systems reviewed from Patient History Form dated on 5/17/2017 and available in the patient's chart under the Media tab. No change noted. PHYSICAL EXAMINATION:  Ms. Cathy Mcwilliams is a very pleasant 82 y.o.  female who presents today in no acute distress, awake, alert, and oriented. She is well dressed, nourished and  groomed. Patient with normal affect. Height is  5' 2\" (1.575 m), weight is 139 lb (63 kg), Body mass index is 25.42 kg/m². Resting respiratory rate is 16. Examination of both upper extremities showing the right shoulder with no swelling. She has mild decrease range of motion of the right elbow and shoulder with minimal discomfort. Right shoulder exam showing that she was able to actively abduct and elevate the right shoulder. She has positive impingement, minimal clicking with no pain. She is not tender over the right shoulder or over the [facility] joint. She has a good radial pulse equally bilaterally. She has intact sensation distally.

## 2019-01-14 ENCOUNTER — OFFICE VISIT (OUTPATIENT)
Dept: FAMILY MEDICINE CLINIC | Age: 84
End: 2019-01-14
Payer: MEDICARE

## 2019-01-14 VITALS
SYSTOLIC BLOOD PRESSURE: 110 MMHG | OXYGEN SATURATION: 96 % | DIASTOLIC BLOOD PRESSURE: 73 MMHG | WEIGHT: 135 LBS | HEART RATE: 93 BPM | RESPIRATION RATE: 16 BRPM | BODY MASS INDEX: 24.84 KG/M2 | HEIGHT: 62 IN

## 2019-01-14 DIAGNOSIS — M75.02 ADHESIVE CAPSULITIS OF LEFT SHOULDER: ICD-10-CM

## 2019-01-14 DIAGNOSIS — I10 ESSENTIAL HYPERTENSION, BENIGN: Primary | ICD-10-CM

## 2019-01-14 DIAGNOSIS — R19.7 DIARRHEA, UNSPECIFIED TYPE: ICD-10-CM

## 2019-01-14 DIAGNOSIS — E78.00 HYPERCHOLESTEREMIA: ICD-10-CM

## 2019-01-14 DIAGNOSIS — Z87.39 PERSONAL HISTORY OF RHEUMATOID ARTHRITIS: ICD-10-CM

## 2019-01-14 DIAGNOSIS — Z80.3 FH: BREAST CANCER: ICD-10-CM

## 2019-01-14 LAB
ALBUMIN SERPL-MCNC: 4.6 G/DL (ref 3.4–5)
ALP BLD-CCNC: 76 U/L (ref 40–129)
ALT SERPL-CCNC: 17 U/L (ref 10–40)
ANION GAP SERPL CALCULATED.3IONS-SCNC: 17 MMOL/L (ref 3–16)
AST SERPL-CCNC: 25 U/L (ref 15–37)
BASOPHILS ABSOLUTE: 0 K/UL (ref 0–0.2)
BASOPHILS RELATIVE PERCENT: 0.4 %
BILIRUB SERPL-MCNC: 1.2 MG/DL (ref 0–1)
BILIRUBIN DIRECT: <0.2 MG/DL (ref 0–0.3)
BILIRUBIN URINE: NEGATIVE
BILIRUBIN, INDIRECT: ABNORMAL MG/DL (ref 0–1)
BLOOD, URINE: NEGATIVE
BUN BLDV-MCNC: 18 MG/DL (ref 7–20)
CALCIUM SERPL-MCNC: 10.3 MG/DL (ref 8.3–10.6)
CHLORIDE BLD-SCNC: 99 MMOL/L (ref 99–110)
CLARITY: CLEAR
CO2: 28 MMOL/L (ref 21–32)
COLOR: YELLOW
CREAT SERPL-MCNC: 0.9 MG/DL (ref 0.6–1.2)
EOSINOPHILS ABSOLUTE: 0.3 K/UL (ref 0–0.6)
EOSINOPHILS RELATIVE PERCENT: 5.5 %
GFR AFRICAN AMERICAN: >60
GFR NON-AFRICAN AMERICAN: >60
GLUCOSE BLD-MCNC: 92 MG/DL (ref 70–99)
GLUCOSE URINE: NEGATIVE MG/DL
HCT VFR BLD CALC: 40.7 % (ref 36–48)
HEMOGLOBIN: 13.7 G/DL (ref 12–16)
KETONES, URINE: NEGATIVE MG/DL
LEUKOCYTE ESTERASE, URINE: NEGATIVE
LYMPHOCYTES ABSOLUTE: 2 K/UL (ref 1–5.1)
LYMPHOCYTES RELATIVE PERCENT: 32.3 %
MCH RBC QN AUTO: 31.6 PG (ref 26–34)
MCHC RBC AUTO-ENTMCNC: 33.6 G/DL (ref 31–36)
MCV RBC AUTO: 94 FL (ref 80–100)
MICROSCOPIC EXAMINATION: NORMAL
MONOCYTES ABSOLUTE: 0.5 K/UL (ref 0–1.3)
MONOCYTES RELATIVE PERCENT: 8.3 %
NEUTROPHILS ABSOLUTE: 3.4 K/UL (ref 1.7–7.7)
NEUTROPHILS RELATIVE PERCENT: 53.5 %
NITRITE, URINE: NEGATIVE
PDW BLD-RTO: 13.7 % (ref 12.4–15.4)
PH UA: 5
PLATELET # BLD: 325 K/UL (ref 135–450)
PMV BLD AUTO: 8.3 FL (ref 5–10.5)
POTASSIUM SERPL-SCNC: 3.5 MMOL/L (ref 3.5–5.1)
PROTEIN UA: NEGATIVE MG/DL
RBC # BLD: 4.33 M/UL (ref 4–5.2)
SEDIMENTATION RATE, ERYTHROCYTE: 14 MM/HR (ref 0–30)
SODIUM BLD-SCNC: 144 MMOL/L (ref 136–145)
SPECIFIC GRAVITY UA: 1.01
TOTAL PROTEIN: 7.7 G/DL (ref 6.4–8.2)
URINE REFLEX TO CULTURE: NORMAL
URINE TYPE: NORMAL
UROBILINOGEN, URINE: 0.2 E.U./DL
WBC # BLD: 6.3 K/UL (ref 4–11)

## 2019-01-14 PROCEDURE — 36415 COLL VENOUS BLD VENIPUNCTURE: CPT | Performed by: INTERNAL MEDICINE

## 2019-01-14 PROCEDURE — 1090F PRES/ABSN URINE INCON ASSESS: CPT | Performed by: INTERNAL MEDICINE

## 2019-01-14 PROCEDURE — G8482 FLU IMMUNIZE ORDER/ADMIN: HCPCS | Performed by: INTERNAL MEDICINE

## 2019-01-14 PROCEDURE — G8427 DOCREV CUR MEDS BY ELIG CLIN: HCPCS | Performed by: INTERNAL MEDICINE

## 2019-01-14 PROCEDURE — 1036F TOBACCO NON-USER: CPT | Performed by: INTERNAL MEDICINE

## 2019-01-14 PROCEDURE — 1123F ACP DISCUSS/DSCN MKR DOCD: CPT | Performed by: INTERNAL MEDICINE

## 2019-01-14 PROCEDURE — 1101F PT FALLS ASSESS-DOCD LE1/YR: CPT | Performed by: INTERNAL MEDICINE

## 2019-01-14 PROCEDURE — 4040F PNEUMOC VAC/ADMIN/RCVD: CPT | Performed by: INTERNAL MEDICINE

## 2019-01-14 PROCEDURE — 81003 URINALYSIS AUTO W/O SCOPE: CPT | Performed by: INTERNAL MEDICINE

## 2019-01-14 PROCEDURE — G8420 CALC BMI NORM PARAMETERS: HCPCS | Performed by: INTERNAL MEDICINE

## 2019-01-14 PROCEDURE — 99214 OFFICE O/P EST MOD 30 MIN: CPT | Performed by: INTERNAL MEDICINE

## 2019-01-14 PROCEDURE — G8400 PT W/DXA NO RESULTS DOC: HCPCS | Performed by: INTERNAL MEDICINE

## 2019-01-16 ENCOUNTER — TELEPHONE (OUTPATIENT)
Dept: ORTHOPEDIC SURGERY | Age: 84
End: 2019-01-16

## 2019-01-17 ENCOUNTER — TELEPHONE (OUTPATIENT)
Dept: FAMILY MEDICINE CLINIC | Age: 84
End: 2019-01-17

## 2019-01-21 ENCOUNTER — TELEPHONE (OUTPATIENT)
Dept: FAMILY MEDICINE CLINIC | Age: 84
End: 2019-01-21

## 2019-01-28 ENCOUNTER — TELEPHONE (OUTPATIENT)
Dept: FAMILY MEDICINE CLINIC | Age: 84
End: 2019-01-28

## 2019-01-29 ENCOUNTER — NURSE ONLY (OUTPATIENT)
Dept: FAMILY MEDICINE CLINIC | Age: 84
End: 2019-01-29
Payer: MEDICARE

## 2019-01-29 DIAGNOSIS — R19.7 DIARRHEA, UNSPECIFIED TYPE: ICD-10-CM

## 2019-01-29 LAB
CONTROL: NORMAL
HEMOCCULT STL QL: NEGATIVE
HEMOCCULT STL QL: NEGATIVE
HEMOCCULT STL QL: NORMAL
HEMOCCULT STL QL: NORMAL

## 2019-01-29 PROCEDURE — 82274 ASSAY TEST FOR BLOOD FECAL: CPT | Performed by: INTERNAL MEDICINE

## 2019-01-29 PROCEDURE — 82270 OCCULT BLOOD FECES: CPT | Performed by: INTERNAL MEDICINE

## 2019-05-23 ENCOUNTER — TELEPHONE (OUTPATIENT)
Dept: FAMILY MEDICINE CLINIC | Age: 84
End: 2019-05-23

## 2019-05-23 DIAGNOSIS — I10 ESSENTIAL HYPERTENSION, BENIGN: ICD-10-CM

## 2019-05-23 RX ORDER — AMLODIPINE BESYLATE 5 MG/1
TABLET ORAL
Qty: 90 TABLET | Refills: 1 | Status: SHIPPED | OUTPATIENT
Start: 2019-05-23 | End: 2019-08-15 | Stop reason: SDUPTHER

## 2019-07-30 ENCOUNTER — OFFICE VISIT (OUTPATIENT)
Dept: FAMILY MEDICINE CLINIC | Age: 84
End: 2019-07-30
Payer: MEDICARE

## 2019-07-30 VITALS
DIASTOLIC BLOOD PRESSURE: 65 MMHG | SYSTOLIC BLOOD PRESSURE: 122 MMHG | HEART RATE: 72 BPM | HEIGHT: 62 IN | BODY MASS INDEX: 25.21 KG/M2 | WEIGHT: 137 LBS | OXYGEN SATURATION: 97 % | RESPIRATION RATE: 12 BRPM

## 2019-07-30 DIAGNOSIS — M75.41 IMPINGEMENT SYNDROME OF RIGHT SHOULDER: ICD-10-CM

## 2019-07-30 DIAGNOSIS — I10 ESSENTIAL HYPERTENSION, BENIGN: Primary | ICD-10-CM

## 2019-07-30 DIAGNOSIS — M19.90 ARTHRITIS: ICD-10-CM

## 2019-07-30 DIAGNOSIS — E78.00 HYPERCHOLESTEREMIA: ICD-10-CM

## 2019-07-30 LAB
ALBUMIN SERPL-MCNC: 4.7 G/DL (ref 3.4–5)
ALP BLD-CCNC: 66 U/L (ref 40–129)
ALT SERPL-CCNC: 16 U/L (ref 10–40)
ANION GAP SERPL CALCULATED.3IONS-SCNC: 16 MMOL/L (ref 3–16)
AST SERPL-CCNC: 25 U/L (ref 15–37)
BILIRUB SERPL-MCNC: 1.5 MG/DL (ref 0–1)
BILIRUBIN DIRECT: 0.3 MG/DL (ref 0–0.3)
BILIRUBIN, INDIRECT: 1.2 MG/DL (ref 0–1)
BUN BLDV-MCNC: 20 MG/DL (ref 7–20)
CALCIUM SERPL-MCNC: 10.6 MG/DL (ref 8.3–10.6)
CHLORIDE BLD-SCNC: 98 MMOL/L (ref 99–110)
CHOLESTEROL, FASTING: 189 MG/DL (ref 0–199)
CO2: 27 MMOL/L (ref 21–32)
CREAT SERPL-MCNC: 1 MG/DL (ref 0.6–1.2)
GFR AFRICAN AMERICAN: >60
GFR NON-AFRICAN AMERICAN: 53
GLUCOSE BLD-MCNC: 96 MG/DL (ref 70–99)
HDLC SERPL-MCNC: 82 MG/DL (ref 40–60)
LDL CHOLESTEROL CALCULATED: 88 MG/DL
POTASSIUM SERPL-SCNC: 3.8 MMOL/L (ref 3.5–5.1)
SODIUM BLD-SCNC: 141 MMOL/L (ref 136–145)
TOTAL PROTEIN: 7.5 G/DL (ref 6.4–8.2)
TRIGLYCERIDE, FASTING: 93 MG/DL (ref 0–150)
TSH REFLEX FT4: 4.12 UIU/ML (ref 0.27–4.2)
URIC ACID, SERUM: 7.3 MG/DL (ref 2.6–6)
VLDLC SERPL CALC-MCNC: 19 MG/DL

## 2019-07-30 PROCEDURE — 1036F TOBACCO NON-USER: CPT | Performed by: INTERNAL MEDICINE

## 2019-07-30 PROCEDURE — 1123F ACP DISCUSS/DSCN MKR DOCD: CPT | Performed by: INTERNAL MEDICINE

## 2019-07-30 PROCEDURE — 4040F PNEUMOC VAC/ADMIN/RCVD: CPT | Performed by: INTERNAL MEDICINE

## 2019-07-30 PROCEDURE — 99214 OFFICE O/P EST MOD 30 MIN: CPT | Performed by: INTERNAL MEDICINE

## 2019-07-30 PROCEDURE — G8400 PT W/DXA NO RESULTS DOC: HCPCS | Performed by: INTERNAL MEDICINE

## 2019-07-30 PROCEDURE — G8417 CALC BMI ABV UP PARAM F/U: HCPCS | Performed by: INTERNAL MEDICINE

## 2019-07-30 PROCEDURE — 1090F PRES/ABSN URINE INCON ASSESS: CPT | Performed by: INTERNAL MEDICINE

## 2019-07-30 PROCEDURE — 36415 COLL VENOUS BLD VENIPUNCTURE: CPT | Performed by: INTERNAL MEDICINE

## 2019-07-30 PROCEDURE — G8427 DOCREV CUR MEDS BY ELIG CLIN: HCPCS | Performed by: INTERNAL MEDICINE

## 2019-07-30 ASSESSMENT — PATIENT HEALTH QUESTIONNAIRE - PHQ9
2. FEELING DOWN, DEPRESSED OR HOPELESS: 0
SUM OF ALL RESPONSES TO PHQ9 QUESTIONS 1 & 2: 0
1. LITTLE INTEREST OR PLEASURE IN DOING THINGS: 0
SUM OF ALL RESPONSES TO PHQ QUESTIONS 1-9: 0
SUM OF ALL RESPONSES TO PHQ QUESTIONS 1-9: 0

## 2019-07-30 NOTE — PROGRESS NOTES
Vomiting/nausea    Meclizine      Makes her feels worse     Zetia [Ezetimibe] Itching    Lipitor [Atorvastatin] Other (See Comments)     myalgia       No outpatient medications have been marked as taking for the 7/30/19 encounter (Office Visit) with Preet Mantilla MD.             Past Medical History:   Diagnosis Date    Advance directive discussed with patient     Asked to bring in:  tells me she is DNR    Gout     HTN (hypertension)     Hypercholesteremia        Past Surgical History:   Procedure Laterality Date   Claudette Lav  2005    Dr. Db Granda  Dec 2012    Dr Shanel Castle :  Right    [address]               Family History   Problem Relation Age of Onset    Cirrhosis Mother         Blood transfusion: Hep C    Diabetes Mother     Hypertension Mother    [facility] Emphysema Father                    Review of Systems        Objective     /65   Pulse 72   Resp 12   Ht 5' 2\" (1.575 m)   Wt 137 lb (62.1 kg)   SpO2 97% Comment: RA  BMI 25.06 kg/m²     @LASTSAO2(3)@    Wt Readings from Last 3 Encounters:   07/30/19 137 lb (62.1 kg)   01/14/19 135 lb (61.2 kg)   10/26/18 139 lb (63 kg)       Physical Exam     NAD alert and cooperative  HEENT: TMs unremarkable. Fair dentition. Mild gingival irritation. Throat is clear. Good upstroke of the carotid. No bruits. Lungs are clear. Good VIPIN ratio without any wheezes rales or rhonchi. Good inspiration. Cardiovascular exam regular rate and rhythm without murmur click. I do not hear her aortic insufficiency. No hepatospleno megaly or epigastric tenderness. No rebound. Good range of motion the hips. Crepitus of the knees. Pulses present lower extremities. Bunion. Overlapping toes right foot. No maceration. Few calluses.     Chemistry        Component Value Date/Time     01/14/2019 1330    K 3.5 01/14/2019 1330    CL 99 01/14/2019 1330    CO2 28 01/14/2019 1330    BUN 18

## 2019-07-30 NOTE — PATIENT INSTRUCTIONS
Instructions  Your Care Instructions    Physical exams can help you stay healthy. Your doctor has checked your overall health and may have suggested ways to take good care of yourself. He or she also may have recommended tests. At home, you can help prevent illness with healthy eating, regular exercise, and other steps. Follow-up care is a key part of your treatment and safety. Be sure to make and go to all appointments, and call your doctor if you are having problems. It's also a good idea to know your test results and keep a list of the medicines you take. How can you care for yourself at home? · Reach and stay at a healthy weight. This will lower your risk for many problems, such as obesity, diabetes, heart disease, and high blood pressure. · Get at least 30 minutes of exercise on most days of the week. Walking is a good choice. You also may want to do other activities, such as running, swimming, cycling, or playing tennis or team sports. · Do not smoke. Smoking can make health problems worse. If you need help quitting, talk to your doctor about stop-smoking programs and medicines. These can increase your chances of quitting for good. · Protect your skin from too much sun. When you're outdoors from 10 a.m. to 4 p.m., stay in the shade or cover up with clothing and a hat with a wide brim. Wear sunglasses that block UV rays. Even when it's cloudy, put broad-spectrum sunscreen (SPF 30 or higher) on any exposed skin. · See a dentist one or two times a year for checkups and to have your teeth cleaned. · Wear a seat belt in the car. · Limit alcohol to 2 drinks a day for men and 1 drink a day for women. Too much alcohol can cause health problems. Follow your doctor's advice about when to have certain tests. These tests can spot problems early. For men and women  · Cholesterol.  Your doctor will tell you how often to have this done based on your overall health and other things that can increase your risk for heart attack and stroke. · Blood pressure. Have your blood pressure checked during a routine doctor visit. Your doctor will tell you how often to check your blood pressure based on your age, your blood pressure results, and other factors. · Diabetes. Ask your doctor whether you should have tests for diabetes. · Vision. Experts recommend that you have yearly exams for glaucoma and other age-related eye problems. · Hearing. Tell your doctor if you notice any change in your hearing. You can have tests to find out how well you hear. · Colon cancer tests. Keep having colon cancer tests as your doctor recommends. You can have one of several types of tests. · Heart attack and stroke risk. At least every 4 to 6 years, you should have your risk for heart attack and stroke assessed. Your doctor uses factors such as your age, blood pressure, cholesterol, and whether you smoke or have diabetes to show what your risk for a heart attack or stroke is over the next 10 years. · Osteoporosis. Talk to your doctor about whether you should have a bone density test to find out whether you have thinning bones. Also ask your doctor about whether you should take calcium and vitamin D supplements. For women  · Pap test and pelvic exam. You may no longer need a Pap test. Talk with your doctor about whether to stop or continue to have Pap tests. · Breast exam and mammogram. Ask how often you should have a mammogram, which is an X-ray of your breasts. A mammogram can spot breast cancer before it can be felt and when it is easiest to treat. · Thyroid disease. Talk to your doctor about whether to have your thyroid checked as part of a regular physical exam. Women have an increased chance of a thyroid problem. For men  · Prostate exam. Talk to your doctor about whether you should have a blood test (called a PSA test) for prostate cancer.  Experts recommend that you discuss the benefits and risks of the test with your doctor before you decide whether to have this test. Some experts say that men ages 79 and older no longer need testing. · Abdominal aortic aneurysm. Ask your doctor whether you should have a test to check for an aneurysm. You may need a test if you ever smoked or if your parent, brother, sister, or child has had an aneurysm. When should you call for help? Watch closely for changes in your health, and be sure to contact your doctor if you have any problems or symptoms that concern you. Where can you learn more? Go to [email or web address]. org and sign in to your [employer] account. Enter G271 in the [employer] box to learn more about \"Well Visit, Over 65: Care Instructions. \"     If you do not have an account, please click on the \"Sign Up Now\" link. Current as of: December 13, 2018  Content Version: 12.0  © 3538-6015 Healthwise, Incorporated. Care instructions adapted under license by [facility] ([facility]). If you have questions about a medical condition or this instruction, always ask your healthcare professional. [address] any warranty or liability for your use of this information.        consider bone densitiy test.

## 2019-07-31 ENCOUNTER — TELEPHONE (OUTPATIENT)
Dept: FAMILY MEDICINE CLINIC | Age: 84
End: 2019-07-31

## 2019-08-14 DIAGNOSIS — E78.00 HYPERCHOLESTEREMIA: ICD-10-CM

## 2019-08-14 DIAGNOSIS — I10 ESSENTIAL HYPERTENSION, BENIGN: ICD-10-CM

## 2019-08-14 RX ORDER — LOSARTAN POTASSIUM AND HYDROCHLOROTHIAZIDE 12.5; 5 MG/1; MG/1
TABLET ORAL
Qty: 90 TABLET | Refills: 3 | OUTPATIENT
Start: 2019-08-14

## 2019-08-14 RX ORDER — SIMVASTATIN 20 MG
TABLET ORAL
Qty: 90 TABLET | Refills: 3 | OUTPATIENT
Start: 2019-08-14

## 2019-08-14 RX ORDER — SIMVASTATIN 20 MG
TABLET ORAL
Qty: 90 TABLET | Refills: 3 | Status: CANCELLED | OUTPATIENT
Start: 2019-08-14

## 2019-08-14 RX ORDER — LOSARTAN POTASSIUM AND HYDROCHLOROTHIAZIDE 12.5; 5 MG/1; MG/1
TABLET ORAL
Qty: 90 TABLET | Refills: 3 | Status: CANCELLED | OUTPATIENT
Start: 2019-08-14

## 2019-08-15 RX ORDER — PRAVASTATIN SODIUM 40 MG
40 TABLET ORAL EVERY EVENING
Qty: 90 TABLET | Refills: 3 | Status: SHIPPED | OUTPATIENT
Start: 2019-08-15 | End: 2019-09-03

## 2019-08-15 RX ORDER — TELMISARTAN AND HYDROCHLORTHIAZIDE 40; 12.5 MG/1; MG/1
1 TABLET ORAL DAILY
Qty: 90 TABLET | Refills: 0 | Status: SHIPPED | OUTPATIENT
Start: 2019-08-15 | End: 2019-08-22

## 2019-08-15 RX ORDER — AMLODIPINE BESYLATE 5 MG/1
TABLET ORAL
Qty: 90 TABLET | Refills: 1 | Status: SHIPPED | OUTPATIENT
Start: 2019-08-15 | End: 2019-09-03 | Stop reason: SDUPTHER

## 2019-08-22 ENCOUNTER — TELEPHONE (OUTPATIENT)
Dept: FAMILY MEDICINE CLINIC | Age: 84
End: 2019-08-22

## 2019-08-22 ENCOUNTER — TELEPHONE (OUTPATIENT)
Dept: ORTHOPEDIC SURGERY | Age: 84
End: 2019-08-22

## 2019-08-22 RX ORDER — LOSARTAN POTASSIUM AND HYDROCHLOROTHIAZIDE 12.5; 5 MG/1; MG/1
1 TABLET ORAL DAILY
Qty: 90 TABLET | Refills: 1 | Status: SHIPPED | OUTPATIENT
Start: 2019-08-22 | End: 2019-09-03

## 2019-08-27 ENCOUNTER — TELEPHONE (OUTPATIENT)
Dept: FAMILY MEDICINE CLINIC | Age: 84
End: 2019-08-27

## 2019-08-27 RX ORDER — SIMVASTATIN 40 MG
TABLET ORAL
Qty: 90 TABLET | Refills: 0 | Status: SHIPPED | OUTPATIENT
Start: 2019-08-27 | End: 2019-09-13 | Stop reason: SDUPTHER

## 2019-08-28 ENCOUNTER — TELEPHONE (OUTPATIENT)
Dept: PAIN MANAGEMENT | Age: 84
End: 2019-08-28

## 2019-08-28 NOTE — TELEPHONE ENCOUNTER
Submitted PA for SIMVISTATIN  Via CMM Key: [id number] STATUS: APPROVED. Please notify patient thank you.

## 2019-08-29 NOTE — TELEPHONE ENCOUNTER
Received APPROVAL letter for Simvastatin 40MG tablets; it is attached. Approval is good until 08/27/2020.

## 2019-09-03 PROBLEM — E79.0 HYPERURICEMIA: Status: ACTIVE | Noted: 2019-09-03

## 2019-10-09 ENCOUNTER — HOSPITAL ENCOUNTER (OUTPATIENT)
Dept: WOMENS IMAGING | Age: 84
Discharge: HOME | End: 2019-10-09
Payer: MEDICARE

## 2019-10-09 DIAGNOSIS — Z12.31 VISIT FOR SCREENING MAMMOGRAM: ICD-10-CM

## 2019-10-09 PROCEDURE — 77063 BREAST TOMOSYNTHESIS BI: CPT

## 2020-10-19 ENCOUNTER — HOSPITAL ENCOUNTER (OUTPATIENT)
Dept: WOMENS IMAGING | Age: 85
Discharge: HOME | End: 2020-10-19
Payer: MEDICARE

## 2020-10-19 PROCEDURE — 77063 BREAST TOMOSYNTHESIS BI: CPT

## 2021-10-11 ENCOUNTER — HOSPITAL ENCOUNTER (INPATIENT)
Age: 86
LOS: 2 days | Discharge: HOME | DRG: 193 | End: 2021-10-13
Attending: INTERNAL MEDICINE | Admitting: INTERNAL MEDICINE
Payer: MEDICARE

## 2021-10-11 ENCOUNTER — APPOINTMENT (OUTPATIENT)
Dept: GENERAL RADIOLOGY | Age: 86
DRG: 193 | End: 2021-10-11
Payer: MEDICARE

## 2021-10-11 ENCOUNTER — APPOINTMENT (OUTPATIENT)
Dept: CT IMAGING | Age: 86
DRG: 193 | End: 2021-10-11
Payer: MEDICARE

## 2021-10-11 DIAGNOSIS — R06.89 DYSPNEA AND RESPIRATORY ABNORMALITIES: Primary | ICD-10-CM

## 2021-10-11 DIAGNOSIS — R41.82 ALTERED MENTAL STATUS, UNSPECIFIED ALTERED MENTAL STATUS TYPE: ICD-10-CM

## 2021-10-11 DIAGNOSIS — J18.9 PNEUMONIA OF BOTH LUNGS DUE TO INFECTIOUS ORGANISM, UNSPECIFIED PART OF LUNG: ICD-10-CM

## 2021-10-11 DIAGNOSIS — Z20.822 SUSPECTED COVID-19 VIRUS INFECTION: ICD-10-CM

## 2021-10-11 DIAGNOSIS — R06.00 DYSPNEA AND RESPIRATORY ABNORMALITIES: Primary | ICD-10-CM

## 2021-10-11 PROBLEM — J12.82 PNEUMONIA DUE TO 2019-NCOV: Status: ACTIVE | Noted: 2021-10-11

## 2021-10-11 PROBLEM — U07.1 PNEUMONIA DUE TO 2019-NCOV: Status: ACTIVE | Noted: 2021-10-11

## 2021-10-11 PROBLEM — G93.40 ACUTE ENCEPHALOPATHY: Status: ACTIVE | Noted: 2021-10-11

## 2021-10-11 PROBLEM — G93.41 ACUTE METABOLIC ENCEPHALOPATHY: Status: ACTIVE | Noted: 2021-10-11

## 2021-10-11 LAB
A/G RATIO: 0.7 (ref 1.1–2.2)
ALBUMIN SERPL-MCNC: 3.2 G/DL (ref 3.4–5)
ALP BLD-CCNC: 115 U/L (ref 40–129)
ALT SERPL-CCNC: 19 U/L (ref 10–40)
ANION GAP SERPL CALCULATED.3IONS-SCNC: 14 MMOL/L (ref 3–16)
AST SERPL-CCNC: 23 U/L (ref 15–37)
BASOPHILS ABSOLUTE: 0.1 K/UL (ref 0–0.2)
BASOPHILS RELATIVE PERCENT: 0.6 %
BILIRUB SERPL-MCNC: 0.7 MG/DL (ref 0–1)
BUN BLDV-MCNC: 16 MG/DL (ref 7–20)
C-REACTIVE PROTEIN: 20 MG/L (ref 0–5.1)
CALCIUM SERPL-MCNC: 9.4 MG/DL (ref 8.3–10.6)
CHLORIDE BLD-SCNC: 97 MMOL/L (ref 99–110)
CO2: 24 MMOL/L (ref 21–32)
CREAT SERPL-MCNC: 0.9 MG/DL (ref 0.6–1.2)
D DIMER: 505 NG/ML DDU (ref 0–229)
EOSINOPHILS ABSOLUTE: 0.1 K/UL (ref 0–0.6)
EOSINOPHILS RELATIVE PERCENT: 1.4 %
FERRITIN: 893.9 NG/ML (ref 15–150)
GFR AFRICAN AMERICAN: >60
GFR NON-AFRICAN AMERICAN: 60
GLOBULIN: 4.8 G/DL
GLUCOSE BLD-MCNC: 119 MG/DL (ref 70–99)
HCT VFR BLD CALC: 38.1 % (ref 36–48)
HEMOGLOBIN: 12.6 G/DL (ref 12–16)
LACTATE DEHYDROGENASE: 272 U/L (ref 100–190)
LACTIC ACID: 1.3 MMOL/L (ref 0.4–2)
LYMPHOCYTES ABSOLUTE: 1.8 K/UL (ref 1–5.1)
LYMPHOCYTES RELATIVE PERCENT: 17.1 %
MCH RBC QN AUTO: 30.2 PG (ref 26–34)
MCHC RBC AUTO-ENTMCNC: 33.1 G/DL (ref 31–36)
MCV RBC AUTO: 91.2 FL (ref 80–100)
MONOCYTES ABSOLUTE: 0.6 K/UL (ref 0–1.3)
MONOCYTES RELATIVE PERCENT: 5.8 %
NEUTROPHILS ABSOLUTE: 7.8 K/UL (ref 1.7–7.7)
NEUTROPHILS RELATIVE PERCENT: 75.1 %
PDW BLD-RTO: 14.4 % (ref 12.4–15.4)
PLATELET # BLD: 426 K/UL (ref 135–450)
PMV BLD AUTO: 7.1 FL (ref 5–10.5)
POTASSIUM REFLEX MAGNESIUM: 3.6 MMOL/L (ref 3.5–5.1)
PRO-BNP: 316 PG/ML (ref 0–449)
PROCALCITONIN: 0.07 NG/ML (ref 0–0.15)
PROCALCITONIN: 0.08 NG/ML (ref 0–0.15)
RBC # BLD: 4.17 M/UL (ref 4–5.2)
SARS-COV-2, NAAT: NOT DETECTED
SODIUM BLD-SCNC: 135 MMOL/L (ref 136–145)
TOTAL PROTEIN: 8 G/DL (ref 6.4–8.2)
TROPONIN: <0.01 NG/ML
WBC # BLD: 10.4 K/UL (ref 4–11)

## 2021-10-11 PROCEDURE — 80053 COMPREHEN METABOLIC PANEL: CPT

## 2021-10-11 PROCEDURE — 36415 COLL VENOUS BLD VENIPUNCTURE: CPT

## 2021-10-11 PROCEDURE — 6360000002 HC RX W HCPCS: Performed by: INTERNAL MEDICINE

## 2021-10-11 PROCEDURE — 82728 ASSAY OF FERRITIN: CPT

## 2021-10-11 PROCEDURE — 85025 COMPLETE CBC W/AUTO DIFF WBC: CPT

## 2021-10-11 PROCEDURE — 85379 FIBRIN DEGRADATION QUANT: CPT

## 2021-10-11 PROCEDURE — 6370000000 HC RX 637 (ALT 250 FOR IP): Performed by: INTERNAL MEDICINE

## 2021-10-11 PROCEDURE — U0005 INFEC AGEN DETEC AMPLI PROBE: HCPCS

## 2021-10-11 PROCEDURE — U0003 INFECTIOUS AGENT DETECTION BY NUCLEIC ACID (DNA OR RNA); SEVERE ACUTE RESPIRATORY SYNDROME CORONAVIRUS 2 (SARS-COV-2) (CORONAVIRUS DISEASE [COVID-19]), AMPLIFIED PROBE TECHNIQUE, MAKING USE OF HIGH THROUGHPUT TECHNOLOGIES AS DESCRIBED BY CMS-2020-01-R: HCPCS

## 2021-10-11 PROCEDURE — 84484 ASSAY OF TROPONIN QUANT: CPT

## 2021-10-11 PROCEDURE — 96375 TX/PRO/DX INJ NEW DRUG ADDON: CPT

## 2021-10-11 PROCEDURE — 83605 ASSAY OF LACTIC ACID: CPT

## 2021-10-11 PROCEDURE — 96365 THER/PROPH/DIAG IV INF INIT: CPT

## 2021-10-11 PROCEDURE — 99284 EMERGENCY DEPT VISIT MOD MDM: CPT

## 2021-10-11 PROCEDURE — 70450 CT HEAD/BRAIN W/O DYE: CPT

## 2021-10-11 PROCEDURE — 87635 SARS-COV-2 COVID-19 AMP PRB: CPT

## 2021-10-11 PROCEDURE — 6360000004 HC RX CONTRAST MEDICATION: Performed by: EMERGENCY MEDICINE

## 2021-10-11 PROCEDURE — 86140 C-REACTIVE PROTEIN: CPT

## 2021-10-11 PROCEDURE — 87040 BLOOD CULTURE FOR BACTERIA: CPT

## 2021-10-11 PROCEDURE — 83615 LACTATE (LD) (LDH) ENZYME: CPT

## 2021-10-11 PROCEDURE — 2580000003 HC RX 258: Performed by: NURSE PRACTITIONER

## 2021-10-11 PROCEDURE — 1200000000 HC SEMI PRIVATE

## 2021-10-11 PROCEDURE — 94760 N-INVAS EAR/PLS OXIMETRY 1: CPT

## 2021-10-11 PROCEDURE — 84145 PROCALCITONIN (PCT): CPT

## 2021-10-11 PROCEDURE — 71260 CT THORAX DX C+: CPT

## 2021-10-11 PROCEDURE — 71046 X-RAY EXAM CHEST 2 VIEWS: CPT

## 2021-10-11 PROCEDURE — 83880 ASSAY OF NATRIURETIC PEPTIDE: CPT

## 2021-10-11 PROCEDURE — 6360000002 HC RX W HCPCS: Performed by: NURSE PRACTITIONER

## 2021-10-11 RX ORDER — BENZONATATE 100 MG/1
200 CAPSULE ORAL 3 TIMES DAILY PRN
Status: DISCONTINUED | OUTPATIENT
Start: 2021-10-11 | End: 2021-10-13 | Stop reason: HOSPADM

## 2021-10-11 RX ORDER — LOSARTAN POTASSIUM AND HYDROCHLOROTHIAZIDE 25; 100 MG/1; MG/1
1 TABLET ORAL DAILY
COMMUNITY
End: 2021-10-11

## 2021-10-11 RX ORDER — LANOLIN ALCOHOL/MO/W.PET/CERES
3 CREAM (GRAM) TOPICAL NIGHTLY
Status: DISCONTINUED | OUTPATIENT
Start: 2021-10-11 | End: 2021-10-13 | Stop reason: HOSPADM

## 2021-10-11 RX ORDER — ATORVASTATIN CALCIUM 20 MG/1
20 TABLET, FILM COATED ORAL DAILY
COMMUNITY
End: 2021-10-11

## 2021-10-11 RX ORDER — ONDANSETRON 2 MG/ML
4 INJECTION INTRAMUSCULAR; INTRAVENOUS EVERY 6 HOURS PRN
Status: DISCONTINUED | OUTPATIENT
Start: 2021-10-11 | End: 2021-10-13 | Stop reason: HOSPADM

## 2021-10-11 RX ORDER — DEXAMETHASONE 6 MG/1
6 TABLET ORAL DAILY
Status: DISCONTINUED | OUTPATIENT
Start: 2021-10-11 | End: 2021-10-12

## 2021-10-11 RX ORDER — SODIUM CHLORIDE 0.9 % (FLUSH) 0.9 %
5-40 SYRINGE (ML) INJECTION EVERY 12 HOURS SCHEDULED
Status: DISCONTINUED | OUTPATIENT
Start: 2021-10-11 | End: 2021-10-13 | Stop reason: HOSPADM

## 2021-10-11 RX ORDER — ZINC SULFATE 50(220)MG
50 CAPSULE ORAL 2 TIMES DAILY
Status: DISCONTINUED | OUTPATIENT
Start: 2021-10-11 | End: 2021-10-12

## 2021-10-11 RX ORDER — SODIUM CHLORIDE 9 MG/ML
25 INJECTION, SOLUTION INTRAVENOUS PRN
Status: DISCONTINUED | OUTPATIENT
Start: 2021-10-11 | End: 2021-10-13 | Stop reason: HOSPADM

## 2021-10-11 RX ORDER — SODIUM CHLORIDE 0.9 % (FLUSH) 0.9 %
5-40 SYRINGE (ML) INJECTION PRN
Status: DISCONTINUED | OUTPATIENT
Start: 2021-10-11 | End: 2021-10-13 | Stop reason: HOSPADM

## 2021-10-11 RX ORDER — AMLODIPINE BESYLATE 10 MG/1
10 TABLET ORAL DAILY
Status: DISCONTINUED | OUTPATIENT
Start: 2021-10-12 | End: 2021-10-13 | Stop reason: HOSPADM

## 2021-10-11 RX ORDER — ALBUTEROL SULFATE 90 UG/1
2 AEROSOL, METERED RESPIRATORY (INHALATION) EVERY 6 HOURS PRN
Status: DISCONTINUED | OUTPATIENT
Start: 2021-10-11 | End: 2021-10-13 | Stop reason: HOSPADM

## 2021-10-11 RX ORDER — ONDANSETRON 2 MG/ML
4 INJECTION INTRAMUSCULAR; INTRAVENOUS ONCE
Status: COMPLETED | OUTPATIENT
Start: 2021-10-11 | End: 2021-10-11

## 2021-10-11 RX ORDER — DEXAMETHASONE SODIUM PHOSPHATE 4 MG/ML
6 INJECTION, SOLUTION INTRA-ARTICULAR; INTRALESIONAL; INTRAMUSCULAR; INTRAVENOUS; SOFT TISSUE ONCE
Status: COMPLETED | OUTPATIENT
Start: 2021-10-11 | End: 2021-10-11

## 2021-10-11 RX ORDER — FAMOTIDINE 20 MG/1
20 TABLET, FILM COATED ORAL 2 TIMES DAILY
Status: DISCONTINUED | OUTPATIENT
Start: 2021-10-11 | End: 2021-10-13 | Stop reason: HOSPADM

## 2021-10-11 RX ORDER — GAUZE BANDAGE 2" X 2"
200 BANDAGE TOPICAL DAILY
Status: DISCONTINUED | OUTPATIENT
Start: 2021-10-11 | End: 2021-10-12

## 2021-10-11 RX ORDER — ACETAMINOPHEN 650 MG/1
650 SUPPOSITORY RECTAL EVERY 6 HOURS PRN
Status: DISCONTINUED | OUTPATIENT
Start: 2021-10-11 | End: 2021-10-13 | Stop reason: HOSPADM

## 2021-10-11 RX ORDER — ACETAMINOPHEN 325 MG/1
650 TABLET ORAL EVERY 6 HOURS PRN
Status: DISCONTINUED | OUTPATIENT
Start: 2021-10-11 | End: 2021-10-13 | Stop reason: HOSPADM

## 2021-10-11 RX ORDER — AMLODIPINE BESYLATE 10 MG/1
10 TABLET ORAL DAILY
COMMUNITY

## 2021-10-11 RX ORDER — ONDANSETRON 4 MG/1
4 TABLET, ORALLY DISINTEGRATING ORAL EVERY 8 HOURS PRN
Status: DISCONTINUED | OUTPATIENT
Start: 2021-10-11 | End: 2021-10-13 | Stop reason: HOSPADM

## 2021-10-11 RX ORDER — POLYETHYLENE GLYCOL 3350 17 G/17G
17 POWDER, FOR SOLUTION ORAL DAILY PRN
Status: DISCONTINUED | OUTPATIENT
Start: 2021-10-11 | End: 2021-10-13 | Stop reason: HOSPADM

## 2021-10-11 RX ADMIN — DEXAMETHASONE 6 MG: 6 TABLET ORAL at 22:03

## 2021-10-11 RX ADMIN — FAMOTIDINE 20 MG: 20 TABLET ORAL at 22:03

## 2021-10-11 RX ADMIN — DEXAMETHASONE SODIUM PHOSPHATE 6 MG: 4 INJECTION, SOLUTION INTRAMUSCULAR; INTRAVENOUS at 16:40

## 2021-10-11 RX ADMIN — CEFTRIAXONE 1000 MG: 1 INJECTION, POWDER, FOR SOLUTION INTRAMUSCULAR; INTRAVENOUS at 18:25

## 2021-10-11 RX ADMIN — IOPAMIDOL 75 ML: 755 INJECTION, SOLUTION INTRAVENOUS at 16:53

## 2021-10-11 RX ADMIN — AZITHROMYCIN MONOHYDRATE 500 MG: 500 INJECTION, POWDER, LYOPHILIZED, FOR SOLUTION INTRAVENOUS at 18:55

## 2021-10-11 RX ADMIN — Medication 3 MG: at 22:03

## 2021-10-11 RX ADMIN — Medication 50 MG: at 22:03

## 2021-10-11 RX ADMIN — ENOXAPARIN SODIUM 30 MG: 100 INJECTION SUBCUTANEOUS at 22:04

## 2021-10-11 RX ADMIN — THIAMINE HCL TAB 100 MG 200 MG: 100 TAB at 22:03

## 2021-10-11 RX ADMIN — ONDANSETRON 4 MG: 2 INJECTION INTRAMUSCULAR; INTRAVENOUS at 16:40

## 2021-10-11 ASSESSMENT — PAIN SCALES - GENERAL
PAINLEVEL_OUTOF10: 0
PAINLEVEL_OUTOF10: 0

## 2021-10-11 NOTE — ED NOTES
Pt's grandson left so a sitter was placed to monitor pt's safety.       Mikael Castillo RN  10/11/21 1942

## 2021-10-11 NOTE — ED NOTES
Pt is A/O to person only. Pt was refusing to go back to her room unless her grandson was here. Pt escorted back to room w/o issue. Grandson called to assist with pt. Elidia MONTOYA at bedside.       Mikael Castillo RN  10/11/21 1632

## 2021-10-11 NOTE — PROGRESS NOTES
Medication Reconciliation    List of medications patient is currently taking is complete. Source of information: 1. Conversation with patient                                       2. EPIC records - Dispense history     Allergies  Morphine, Vicodin [hydrocodone-acetaminophen], Meclizine, Zetia [ezetimibe], and Lipitor [atorvastatin]     Notes regarding home medications:   1. The only medication patient takes regularly is Amlodipine 10 mg po daily.     Elva Berrios RP, PharmD, BCPS  10/11/2021 4:29 PM

## 2021-10-11 NOTE — ED NOTES
Per tez pt has been increasingly confused over the last few months.       Rizwan Duran RN  10/11/21 [id number]

## 2021-10-11 NOTE — ED NOTES
Pt extremely uncooperative with pt care. Pt refusing to have vitals taken or taking a SPO2. Leonel Wall NP notified.       Alanna Desouza RN  10/11/21 1941

## 2021-10-12 PROBLEM — J18.9 PNEUMONIA: Status: ACTIVE | Noted: 2021-10-12

## 2021-10-12 LAB
A/G RATIO: 0.5 (ref 1.1–2.2)
ALBUMIN SERPL-MCNC: 2.8 G/DL (ref 3.4–5)
ALP BLD-CCNC: 114 U/L (ref 40–129)
ALT SERPL-CCNC: 19 U/L (ref 10–40)
ANION GAP SERPL CALCULATED.3IONS-SCNC: 16 MMOL/L (ref 3–16)
AST SERPL-CCNC: 24 U/L (ref 15–37)
BASOPHILS ABSOLUTE: 0 K/UL (ref 0–0.2)
BASOPHILS RELATIVE PERCENT: 0.3 %
BILIRUB SERPL-MCNC: 0.4 MG/DL (ref 0–1)
BILIRUBIN URINE: NEGATIVE
BLOOD, URINE: NEGATIVE
BUN BLDV-MCNC: 16 MG/DL (ref 7–20)
CALCIUM SERPL-MCNC: 9.5 MG/DL (ref 8.3–10.6)
CHLORIDE BLD-SCNC: 98 MMOL/L (ref 99–110)
CLARITY: CLEAR
CO2: 19 MMOL/L (ref 21–32)
COLOR: YELLOW
CREAT SERPL-MCNC: 0.8 MG/DL (ref 0.6–1.2)
EOSINOPHILS ABSOLUTE: 0 K/UL (ref 0–0.6)
EOSINOPHILS RELATIVE PERCENT: 0 %
EPITHELIAL CELLS, UA: 3 /HPF (ref 0–5)
GFR AFRICAN AMERICAN: >60
GFR NON-AFRICAN AMERICAN: >60
GLOBULIN: 5.1 G/DL
GLUCOSE BLD-MCNC: 135 MG/DL (ref 70–99)
GLUCOSE URINE: NEGATIVE MG/DL
HCT VFR BLD CALC: 39.2 % (ref 36–48)
HEMOGLOBIN: 13 G/DL (ref 12–16)
HYALINE CASTS: 1 /LPF (ref 0–8)
KETONES, URINE: NEGATIVE MG/DL
LEUKOCYTE ESTERASE, URINE: ABNORMAL
LYMPHOCYTES ABSOLUTE: 1 K/UL (ref 1–5.1)
LYMPHOCYTES RELATIVE PERCENT: 13.3 %
MCH RBC QN AUTO: 30.5 PG (ref 26–34)
MCHC RBC AUTO-ENTMCNC: 33.3 G/DL (ref 31–36)
MCV RBC AUTO: 91.5 FL (ref 80–100)
MICROSCOPIC EXAMINATION: YES
MONOCYTES ABSOLUTE: 0.1 K/UL (ref 0–1.3)
MONOCYTES RELATIVE PERCENT: 0.7 %
NEUTROPHILS ABSOLUTE: 6.3 K/UL (ref 1.7–7.7)
NEUTROPHILS RELATIVE PERCENT: 85.7 %
NITRITE, URINE: NEGATIVE
PDW BLD-RTO: 14.3 % (ref 12.4–15.4)
PH UA: 6 (ref 5–8)
PLATELET # BLD: 437 K/UL (ref 135–450)
PMV BLD AUTO: 7.3 FL (ref 5–10.5)
POTASSIUM REFLEX MAGNESIUM: 4.1 MMOL/L (ref 3.5–5.1)
PROTEIN UA: NEGATIVE MG/DL
RBC # BLD: 4.28 M/UL (ref 4–5.2)
RBC UA: 2 /HPF (ref 0–4)
SARS-COV-2: NORMAL
SODIUM BLD-SCNC: 133 MMOL/L (ref 136–145)
SPECIFIC GRAVITY UA: 1.01 (ref 1–1.03)
TOTAL PROTEIN: 7.9 G/DL (ref 6.4–8.2)
URINE REFLEX TO CULTURE: ABNORMAL
URINE TYPE: ABNORMAL
UROBILINOGEN, URINE: 0.2 E.U./DL
WBC # BLD: 7.4 K/UL (ref 4–11)
WBC UA: 5 /HPF (ref 0–5)

## 2021-10-12 PROCEDURE — 94761 N-INVAS EAR/PLS OXIMETRY MLT: CPT

## 2021-10-12 PROCEDURE — 6370000000 HC RX 637 (ALT 250 FOR IP): Performed by: INTERNAL MEDICINE

## 2021-10-12 PROCEDURE — 97530 THERAPEUTIC ACTIVITIES: CPT

## 2021-10-12 PROCEDURE — 97166 OT EVAL MOD COMPLEX 45 MIN: CPT

## 2021-10-12 PROCEDURE — 97162 PT EVAL MOD COMPLEX 30 MIN: CPT

## 2021-10-12 PROCEDURE — 2580000003 HC RX 258: Performed by: INTERNAL MEDICINE

## 2021-10-12 PROCEDURE — 85025 COMPLETE CBC W/AUTO DIFF WBC: CPT

## 2021-10-12 PROCEDURE — 80053 COMPREHEN METABOLIC PANEL: CPT

## 2021-10-12 PROCEDURE — 6360000002 HC RX W HCPCS: Performed by: INTERNAL MEDICINE

## 2021-10-12 PROCEDURE — 94150 VITAL CAPACITY TEST: CPT

## 2021-10-12 PROCEDURE — 97535 SELF CARE MNGMENT TRAINING: CPT

## 2021-10-12 PROCEDURE — 81001 URINALYSIS AUTO W/SCOPE: CPT

## 2021-10-12 PROCEDURE — 1200000000 HC SEMI PRIVATE

## 2021-10-12 PROCEDURE — 36415 COLL VENOUS BLD VENIPUNCTURE: CPT

## 2021-10-12 RX ADMIN — FAMOTIDINE 20 MG: 20 TABLET ORAL at 09:17

## 2021-10-12 RX ADMIN — CEFTRIAXONE 1000 MG: 1 INJECTION, POWDER, FOR SOLUTION INTRAMUSCULAR; INTRAVENOUS at 17:44

## 2021-10-12 RX ADMIN — ENOXAPARIN SODIUM 30 MG: 100 INJECTION SUBCUTANEOUS at 09:17

## 2021-10-12 RX ADMIN — AMLODIPINE BESYLATE 10 MG: 10 TABLET ORAL at 09:17

## 2021-10-12 RX ADMIN — ENOXAPARIN SODIUM 30 MG: 100 INJECTION SUBCUTANEOUS at 20:48

## 2021-10-12 RX ADMIN — BENZONATATE 200 MG: 100 CAPSULE ORAL at 20:48

## 2021-10-12 RX ADMIN — AZITHROMYCIN MONOHYDRATE 500 MG: 500 INJECTION, POWDER, LYOPHILIZED, FOR SOLUTION INTRAVENOUS at 18:23

## 2021-10-12 RX ADMIN — FAMOTIDINE 20 MG: 20 TABLET ORAL at 20:48

## 2021-10-12 RX ADMIN — Medication 10 ML: at 20:49

## 2021-10-12 RX ADMIN — Medication 10 ML: at 09:17

## 2021-10-12 RX ADMIN — Medication 3 MG: at 20:48

## 2021-10-12 ASSESSMENT — PAIN SCALES - GENERAL
PAINLEVEL_OUTOF10: 0
PAINLEVEL_OUTOF10: 0

## 2021-10-12 NOTE — PROGRESS NOTES
Patient admitted to room 4273 from emergency room. Telemetry monitoring placed for covid r/o, NSR. Patient alert to self but alert to place at times. Gait is steady with c/o generalized weakness, ambulate from stretcher to bed x1 SBA, tolerated well. Tolerating fluids and po medication. Denies pain and SOB at this time. Cooperative at times. Pt on wait list for telecamera. All fall precautions in place, bed alarm on, oriented to room and call light within reach.  Electronically signed by Dominic De La Vega RN on 10/12/2021 at 12:45 AM

## 2021-10-12 NOTE — PLAN OF CARE
Problem: Falls - Risk of:  Goal: Will remain free from falls  Description: Will remain free from falls  10/12/2021 1020 by Tejas Mcgraw RN  Outcome: Ongoing  10/12/2021 0016 by Sherwin Koch RN  Outcome: Ongoing  Goal: Absence of physical injury  Description: Absence of physical injury  10/12/2021 1020 by Tejas Mcgraw RN  Outcome: Ongoing  10/12/2021 0016 by Sherwin Koch RN  Outcome: Ongoing     Problem: Confusion - Acute:  Goal: Absence of continued neurological deterioration signs and symptoms  Description: Absence of continued neurological deterioration signs and symptoms  10/12/2021 1020 by Tejas Mgcraw RN  Outcome: Ongoing  10/12/2021 0016 by Sherwin Koch RN  Outcome: Ongoing  Goal: Mental status will be restored to baseline  Description: Mental status will be restored to baseline  10/12/2021 1020 by Tejas Mcgraw RN  Outcome: Ongoing  10/12/2021 0016 by Sherwin Koch RN  Outcome: Ongoing     Problem: Discharge Planning:  Goal: Ability to perform activities of daily living will improve  Description: Ability to perform activities of daily living will improve  10/12/2021 1020 by Tejas Mcgraw RN  Outcome: Ongoing  10/12/2021 0016 by Sherwin Koch RN  Outcome: Ongoing  Goal: Participates in care planning  Description: Participates in care planning  10/12/2021 1020 by Tejas Mcgraw RN  Outcome: Ongoing  10/12/2021 0016 by Sherwin Koch RN  Outcome: Ongoing     Problem: Injury - Risk of, Physical Injury:  Goal: Will remain free from falls  Description: Will remain free from falls  10/12/2021 1020 by Tejas Mcgraw RN  Outcome: Ongoing  10/12/2021 0016 by Sherwin Koch RN  Outcome: Ongoing  Goal: Absence of physical injury  Description: Absence of physical injury  10/12/2021 1020 by Tejas Mcgraw RN  Outcome: Ongoing  10/12/2021 0016 by Sherwin Koch RN  Outcome: Ongoing     Problem: Mood - Altered:  Goal: Mood stable  Description: Mood stable  10/12/2021 1020 by Taylor Renae RN  Outcome: Ongoing  10/12/2021 0016 by Sudhir Callejas RN  Outcome: Ongoing  Goal: Absence of abusive behavior  Description: Absence of abusive behavior  10/12/2021 1020 by Taylor Renae RN  Outcome: Ongoing  10/12/2021 0016 by Sudhir Callejas RN  Outcome: Ongoing  Goal: Verbalizations of feeling emotionally comfortable while being cared for will increase  Description: Verbalizations of feeling emotionally comfortable while being cared for will increase  10/12/2021 1020 by Taylor Reane RN  Outcome: Ongoing  10/12/2021 0016 by Sudhir Callejas RN  Outcome: Ongoing     Problem: Psychomotor Activity - Altered:  Goal: Absence of psychomotor disturbance signs and symptoms  Description: Absence of psychomotor disturbance signs and symptoms  10/12/2021 1020 by Taylor Renae RN  Outcome: Ongoing  10/12/2021 0016 by Sudhir Callejas RN  Outcome: Ongoing     Problem: Sensory Perception - Impaired:  Goal: Demonstrations of improved sensory functioning will increase  Description: Demonstrations of improved sensory functioning will increase  10/12/2021 1020 by Taylor Renae RN  Outcome: Ongoing  10/12/2021 0016 by Sudhir Callejas RN  Outcome: Ongoing  Goal: Decrease in sensory misperception frequency  Description: Decrease in sensory misperception frequency  10/12/2021 1020 by Taylor Renae RN  Outcome: Ongoing  10/12/2021 0016 by Sudhir Callejas RN  Outcome: Ongoing  Goal: Able to refrain from responding to false sensory perceptions  Description: Able to refrain from responding to false sensory perceptions  10/12/2021 1020 by Taylor Renae RN  Outcome: Ongoing  10/12/2021 0016 by Sudhir Callejas RN  Outcome: Ongoing  Goal: Demonstrates accurate environmental perceptions  Description: Demonstrates accurate environmental perceptions  10/12/2021 1020 by Taylor Renae RN  Outcome: Ongoing  10/12/2021 0016 by Maira Brito RN  Outcome: Ongoing  Goal: Able to distinguish between reality-based and nonreality-based thinking  Description: Able to distinguish between reality-based and nonreality-based thinking  10/12/2021 1020 by Viktoria Kendall RN  Outcome: Ongoing  10/12/2021 0016 by Maira Brito RN  Outcome: Ongoing  Goal: Able to interrupt nonreality-based thinking  Description: Able to interrupt nonreality-based thinking  10/12/2021 1020 by Viktoria Kendall RN  Outcome: Ongoing  10/12/2021 0016 by Maira Brito RN  Outcome: Ongoing     Problem: Sleep Pattern Disturbance:  Goal: Appears well-rested  Description: Appears well-rested  10/12/2021 1020 by Viktoria Kendall RN  Outcome: Ongoing  10/12/2021 0016 by Maira Brito RN  Outcome: Ongoing     Problem: Airway Clearance - Ineffective  Goal: Achieve or maintain patent airway  10/12/2021 1020 by Viktoria Kendall RN  Outcome: Ongoing  10/12/2021 0016 by Maira Brito RN  Outcome: Ongoing     Problem: Gas Exchange - Impaired  Goal: Absence of hypoxia  10/12/2021 1020 by Viktoria Kendall RN  Outcome: Ongoing  10/12/2021 0016 by Maira Brito RN  Outcome: Ongoing  Goal: Promote optimal lung function  10/12/2021 1020 by Viktoria Kendall RN  Outcome: Ongoing  10/12/2021 0016 by Maira Brito RN  Outcome: Ongoing     Problem: Breathing Pattern - Ineffective  Goal: Ability to achieve and maintain a regular respiratory rate  10/12/2021 1020 by Viktoria Kendall RN  Outcome: Ongoing  10/12/2021 0016 by Maira Brito RN  Outcome: Ongoing     Problem:  Body Temperature -  Risk of, Imbalanced  Goal: Ability to maintain a body temperature within defined limits  10/12/2021 1020 by Viktoria Kendall RN  Outcome: Ongoing  10/12/2021 0016 by Maira Brito RN  Outcome: Ongoing  Goal: Will regain or maintain usual level of consciousness  10/12/2021 1020 by Viktoria Kendall RN  Outcome: Ongoing  10/12/2021 0016 by Massimo Sánchez RN  Outcome: Ongoing  Goal: Complications related to the disease process, condition or treatment will be avoided or minimized  10/12/2021 1020 by Claudean Patella, RN  Outcome: Ongoing  10/12/2021 0016 by Massimo Sánchez RN  Outcome: Ongoing

## 2021-10-12 NOTE — PROGRESS NOTES
(1971); Varicose vein surgery; Rectocele repair (2005); and Total knee arthroplasty (Dec 2012). Restrictions  Restrictions/Precautions  Restrictions/Precautions: Fall Risk, Isolation  Position Activity Restriction  Other position/activity restrictions: COVID r/o; room air     Vision/Hearing  Vision: Within Functional Limits  Hearing: Within functional limits       Subjective  General  Chart Reviewed: Yes  Patient assessed for rehabilitation services?: Yes  Additional Pertinent Hx: Pt is an 82 y.o. female who presented to the ED on 10/11/21 with increasing confusion over the last several days and shortness of breath with exertion. Pt found to have pnuemonia; currently COVID r/o. Response To Previous Treatment: Not applicable  Family / Caregiver Present: No  Referring Practitioner: Yani Pena MD  Referral Date : 10/11/21  Diagnosis: pneumonia  Follows Commands: Within Functional Limits  Subjective  Subjective: Pt is agreeable to PT. Pain Screening  Patient Currently in Pain: Denies          Orientation  Orientation  Overall Orientation Status: Impaired  Orientation Level: Oriented to person;Disoriented to time;Disoriented to place; Disoriented to situation     Social/Functional History  Social/Functional History  Lives With: Alone  Type of Home: Apartment  Additional Comments: Pt unable to provide any reliable PLOF or social history. Cognition   Cognition  Overall Cognitive Status: Exceptions  Arousal/Alertness: Appropriate responses to stimuli  Following Commands:  Follows one step commands consistently  Attention Span: Appears intact  Memory: Decreased short term memory;Decreased long term memory  Safety Judgement: Decreased awareness of need for safety;Decreased awareness of need for assistance  Problem Solving: Decreased awareness of errors  Insights: Decreased awareness of deficits  Initiation: Requires cues for some  Sequencing: Requires cues for some    Objective  Strength RLE  Strength RLE: WFL  Strength LLE  Strength LLE: WFL  Motor Control  Gross Motor?: WFL     Bed mobility  Supine to Sit: Modified independent  Sit to Supine: Unable to assess (in recliner at end of session)  Transfers  Sit to Stand: Stand by assistance  Stand to sit: Stand by assistance  Ambulation  Ambulation?: Yes  Ambulation 1  Surface: level tile  Device: No Device  Assistance: Stand by assistance;Contact guard assistance  Quality of Gait: limited floor clearance  Gait Deviations: Slow Raysa;Decreased step length;Decreased step height  Distance: 48' + 60'  Comments: voided in restroom - independent with pericare and managing garments. Pt did have difficulty managing bathroom threshhold. Stairs/Curb  Stairs?: No     Balance  Posture: Good  Sitting - Static: Good  Sitting - Dynamic: Good  Standing - Static: Fair;+  Standing - Dynamic: Fair;+        Plan   Plan  Times per week: 3-5x/week  Current Treatment Recommendations: Functional Mobility Training, Transfer Training, Balance Training, Gait Training, Cognitive Reorientation, Patient/Caregiver Education & Training, Safety Education & Training, Equipment Evaluation, Education, & procurement, Neuromuscular Re-education  Safety Devices  Type of devices:  All fall risk precautions in place, Call light within reach, Gait belt, Patient at risk for falls, Left in chair, Chair alarm in place, Nurse notified      AM-PAC Score  AM-PAC Inpatient Mobility Raw Score : 20 (10/12/21 0835)  AM-PAC Inpatient T-Scale Score : 47.67 (10/12/21 0835)  Mobility Inpatient CMS 0-100% Score: 35.83 (10/12/21 0835)  Mobility Inpatient CMS G-Code Modifier : Alin Ibarra (10/12/21 5546)          Goals  Short term goals  Time Frame for Short term goals: by acute discharge  Short term goal 1: sit<>stand independently  Short term goal 2: ambulate > 48' with supv  Patient Goals   Patient goals : none stated       Therapy Time   Individual Concurrent Group Co-treatment   Time In 0705         Time Out 0740         Minutes 35         Timed Code Treatment Minutes: 25 Minutes       Daina Flynn, PT

## 2021-10-12 NOTE — PROGRESS NOTES
Pt remains confused, only oriented to self. Confusion with how to operate call light and telephone despite redirection. Has been in chair since 8 am when therapy helped to chair. Calm and quiet. Remains next in line for telecam waitlist. Will monitor.

## 2021-10-12 NOTE — PROGRESS NOTES
Occupational Therapy   Occupational Therapy Initial Assessment  Date: 10/12/2021   Patient Name: Trent Madrigal  MRN: [id number]     : 1935    Date of Service: 10/12/2021    Discharge Recommendations:  24 hour supervision or assist, Home with Home health OT       Assessment   Performance deficits / Impairments: Decreased functional mobility ; Decreased ADL status; Decreased strength;Decreased ROM; Decreased cognition;Decreased high-level IADLs;Decreased balance;Decreased safe awareness;Decreased endurance  Assessment: Pt is an 82 y.o. female admitted with PNA. Baseline unclear as pt is very poor historian, but per pt report pt lives alone and independent ADLs, IADLs, and fxl mobility. Pt reports DIL leaves nearby. Per notes in chart, daughter who takes care of pt is currently in rehab for COVID and pt's grandson brought her in. Pt currently limited in self-care by the above deficits, today requiring SBA fxl transfers/mobility with no AD, SBA LB dressing, toileting, and grooming in stance at sink, and anticipate pt would require overall SBA for ADLs. Pt confused and oriented to person only. Pt currently functioning at a level that requires 24hr supv - unsure if this can be provided at home. She would benefit from therapy beyond acute discharge. Prognosis: Good  Decision Making: Medium Complexity  OT Education: OT Role;Plan of Care;ADL Adaptive Strategies;Transfer Training;Orientation  Barriers to Learning: cognition  REQUIRES OT FOLLOW UP: Yes  Activity Tolerance  Activity Tolerance: Patient Tolerated treatment well;Treatment limited secondary to decreased cognition  Safety Devices  Safety Devices in place: Yes  Type of devices: Call light within reach; Chair alarm in place; Left in chair;Gait belt           Patient Diagnosis(es): The primary encounter diagnosis was Dyspnea and respiratory abnormalities.  Diagnoses of Altered mental status, unspecified altered mental status type, Pneumonia of both lungs due to infectious organism, unspecified part of lung, and Suspected COVID-19 virus infection were also pertinent to this visit. has a past medical history of Advance directive discussed with patient, Chronic kidney disease (CKD) stage G3a/A1, moderately decreased glomerular filtration rate (GFR) between 45-59 mL/min/1.73 square meter and albuminuria creatinine ratio less than 30 mg/g ([address] Gout, Gout, HTN (hypertension), and Hypercholesteremia. has a past surgical history that includes Hysterectomy (1971); Varicose vein surgery; Rectocele repair (2005); and Total knee arthroplasty (Dec 2012). Restrictions  Restrictions/Precautions  Restrictions/Precautions: Fall Risk, Isolation  Position Activity Restriction  Other position/activity restrictions: COVID r/o; room air    Subjective   General  Chart Reviewed: Yes  Patient assessed for rehabilitation services?: Yes  Additional Pertinent Hx: Per Dr. Jany Cottrell H&P: \" Pt is an 82y.o. year-old female with a history of hypertension and CKD3 who presents to the emergency room accompanied by her grandson. He reports that she has had increasing confusion over the last several days and shortness of breath with exertion. In the emergency room she was found to have pneumonia. She is being admitted for further evaluation and treatment. \"  Family / Caregiver Present: No  Referring Practitioner: Yessica Rojas MD  Diagnosis: PNA, COVID r/o  Subjective  Subjective: Pt met b/s for OT eval/tx. Pt seated in recliner on arrival, agreeable to participate in therapy. Pt denies pain. Pt pleasantly confused.     Social/Functional History  Social/Functional History  Lives With: Alone  Type of Home: House  Home Layout: One level  Home Access: Stairs to enter with rails  Entrance Stairs - Number of Steps: pt reports \"a lot\" of steps to enter  Bathroom Shower/Tub: Walk-in shower  Bathroom Toilet: Handicap height  Bathroom Equipment: Grab bars in shower, Grab bars around toilet  Home Equipment:  (no DME)  ADL Assistance: Independent  Homemaking Assistance: Independent  Ambulation Assistance: Independent (no device)  Transfer Assistance: Independent  Additional Comments: Above information per pt who is very poor historian. Pt reports her DIL leaves nearby. Objective   Vision: Impaired  Vision Exceptions: Wears glasses at all times  Hearing: Within functional limits      Orientation  Overall Orientation Status: Impaired  Orientation Level: Oriented to person;Disoriented to situation;Disoriented to time;Disoriented to place (knew name, not birthday. Able to choose hospital when given choices)     Balance  Sitting Balance: Supervision  Standing Balance: Stand by assistance  Functional Mobility  Functional - Mobility Device: No device  Activity: To/from bathroom  Assist Level: Stand by assistance  Functional Mobility Comments: Pt completed fxl mobility to/from BR with no AD and SBA. ADL  Grooming: Stand by assistance (standing at sink to wash hands)  LE Dressing: Stand by assistance (to don/doff shoes)  Toileting: Stand by assistance  Additional Comments: Anticipate pt is independent feeding, SBA bathing and dressing based on ROM/strength, balance, endurance. Bed mobility  Comment: NT-pt seated in recliner at start/end of session     Transfers  Sit to stand: Stand by assistance  Stand to sit: Stand by assistance   Toilet Transfers  Toilet - Technique: Ambulating  Equipment Used: Grab bars  Toilet Transfer: Stand by assistance    Cognition  Overall Cognitive Status: Exceptions  Arousal/Alertness: Appropriate responses to stimuli  Following Commands:  Follows one step commands consistently  Attention Span: Appears intact  Memory: Decreased short term memory;Decreased long term memory  Safety Judgement: Decreased awareness of need for safety;Decreased awareness of need for assistance  Problem Solving: Decreased awareness of errors  Insights: Decreased awareness of deficits  Initiation: to serve as OT d/c summary if pt is d/c-ed prior to next therapy session.     Albert Bagley, OTR/L [id number]

## 2021-10-12 NOTE — PROGRESS NOTES
Pt's PCR covid test is indeterminate and rapid is negative. Spoke with Dr Navid Simeon, to leave pt in isolation. Pt still confused, placed tele cam in room at this time.

## 2021-10-12 NOTE — ED NOTES
ED SBAR report provider to [facility], [address] [place] [address]. Patient to be transported to Room 3270 via stretcher by ED tech. Patient transported with bedside cardiac monitor and with IV medications infusing. IV site clean, dry, and intact. MEWS score and pain assessed as JERMAINE and documented. Updated family on plan of care.      Ross Suarez RN  10/11/21 2020

## 2021-10-12 NOTE — PLAN OF CARE
sensory misperception frequency  Outcome: Ongoing  Goal: Able to refrain from responding to false sensory perceptions  Description: Able to refrain from responding to false sensory perceptions  Outcome: Ongoing  Goal: Demonstrates accurate environmental perceptions  Description: Demonstrates accurate environmental perceptions  Outcome: Ongoing  Goal: Able to distinguish between reality-based and nonreality-based thinking  Description: Able to distinguish between reality-based and nonreality-based thinking  Outcome: Ongoing  Goal: Able to interrupt nonreality-based thinking  Description: Able to interrupt nonreality-based thinking  Outcome: Ongoing     Problem: Sleep Pattern Disturbance:  Goal: Appears well-rested  Description: Appears well-rested  Outcome: Ongoing     Problem: Airway Clearance - Ineffective  Goal: Achieve or maintain patent airway  Outcome: Ongoing     Problem: Gas Exchange - Impaired  Goal: Absence of hypoxia  Outcome: Ongoing  Goal: Promote optimal lung function  Outcome: Ongoing     Problem: Breathing Pattern - Ineffective  Goal: Ability to achieve and maintain a regular respiratory rate  Outcome: Ongoing     Problem:  Body Temperature -  Risk of, Imbalanced  Goal: Ability to maintain a body temperature within defined limits  Outcome: Ongoing  Goal: Will regain or maintain usual level of consciousness  Outcome: Ongoing  Goal: Complications related to the disease process, condition or treatment will be avoided or minimized  Outcome: Ongoing     Problem: Isolation Precautions - Risk of Spread of Infection  Goal: Prevent transmission of infection  Outcome: Ongoing     Problem: Nutrition Deficits  Goal: Optimize nutritional status  Outcome: Ongoing     Problem: Risk for Fluid Volume Deficit  Goal: Maintain normal heart rhythm  Outcome: Ongoing  Goal: Maintain absence of muscle cramping  Outcome: Ongoing  Goal: Maintain normal serum potassium, sodium, calcium, phosphorus, and pH  Outcome: Ongoing Problem: Loneliness or Risk for Loneliness  Goal: Demonstrate positive use of time alone when socialization is not possible  Outcome: Ongoing     Problem: Fatigue  Goal: Verbalize increase energy and improved vitality  Outcome: Ongoing     Problem: Patient Education: Go to Patient Education Activity  Goal: Patient/Family Education  Outcome: Ongoing

## 2021-10-12 NOTE — CARE COORDINATION
Attempted twice to contact daughter-in-law but no answer, HIPPA compliant messages left. Unable to call grandson, no number in the chart. Will continue to try & attempt reaching family to complete assessment.     Verona Dial RN, BSN,   [phone]  Electronically signed by Verona Dial RN on 10/12/2021 at 11:11 AM

## 2021-10-12 NOTE — H&P
Hospital Medicine  History and Physical    PCP: Krupa Rollins  Patient Name: Brooke Tariq    Date of Service: Pt seen/examined on 10/12/21 and admitted to Inpatient with expected LOS greater than two midnights due to medical therapy  CHIEF COMPLAINT:  Pt c/o shortness of breath with exertion and increased confusion  HISTORY OF PRESENT ILLNESS: Pt is an 82y.o. year-old female with a history of hypertension and CKD3 who presents to the emergency room accompanied by her grandson. He reports that she has had increasing confusion over the last several days and shortness of breath with exertion. In the emergency room she was found to have pneumonia. She is being admitted for further evaluation and treatment. Pt unable to provide associated signs and symptoms at this time due to altered mental status        Past Medical History:        Diagnosis Date    Advance directive discussed with patient     Asked to bring in:  tells me she is DNR    Chronic kidney disease (CKD) stage G3a/A1, moderately decreased glomerular filtration rate (GFR) between 45-59 mL/min/1.73 square meter and albuminuria creatinine ratio less than 30 mg/g (HCC)     Gout     Gout     HTN (hypertension)     Hypercholesteremia        Past Surgical History:        Procedure Laterality Date    HYSTERECTOMY  1971    RECTOCELE REPAIR  2005    Dr. Felicia Quach  Dec 2012    Dr Marita Sutton :  Right   [facility]         Allergies:  Morphine, Vicodin [hydrocodone-acetaminophen], Meclizine, Zetia [ezetimibe], and Lipitor [atorvastatin]    Medications Prior to Admission:    Prior to Admission medications    Medication Sig Start Date End Date Taking?  Authorizing Provider   amLODIPine (NORVASC) 10 MG tablet Take 10 mg by mouth daily   Yes Historical Provider, MD       Family History:       Problem Relation Age of Onset    Cirrhosis Mother         Blood transfusion: Hep C    Diabetes Mother     Hypertension Mother  Emphysema Father                Social History:   TOBACCO:   reports that she has never smoked. She has never used smokeless tobacco.  ETOH:   reports current alcohol use. OCCUPATION:      REVIEW OF SYSTEMS:  A full review of systems was performed and is negative except for that which appears in the HPI    Physical Exam:    Vitals: /65   Pulse 94   Temp 98.2 °F (36.8 °C) (Oral)   Resp 18   Wt 137 lb 5.6 oz (62.3 kg)   SpO2 90%   BMI 25.12 kg/m²   General appearance: WD/WN 82y.o. year-old female who is alert, appears stated age and is cooperative  HEENT: Head: Normocephalic, no lesions, without obvious abnormality. Eye: Normal external eye, conjunctiva, lids cornea, PEERL. Ears: Normal external ears. Non-tender. Nose: Normal external nose, mucus membranes and septum. Pharynx: Dental Hygiene adequate. Normal buccal mucosa. Normal pharynx. Neck: no adenopathy, no carotid bruit, no JVD, supple, symmetrical, trachea midline and thyroid not enlarged, symmetric, no tenderness/mass/nodules  Lungs: Scattered rhonchi on auscultation bilaterally and no use of accessory muscles. Heart: regular rate and rhythm, S1, S2 normal, no murmur, click, rub or gallop and normal apical impulse  Abdomen: soft, non-tender; bowel sounds normal; no masses, no organomegaly  Extremities: extremities atraumatic, no cyanosis or edema and Homans sign is negative, no sign of DVT. Capillary Refill: Acceptable < 3 seconds   Peripheral Pulses: +3 easily felt, not easily obliterated with pressures   Skin: Skin color, texture, turgor normal. No rashes or lesions on exposed skin  Neurologic: Neurovascularly intact without any focal sensory/motor deficits. Cranial nerves: II-XII intact, grossly non-focal. Gait was not tested.   Mental Status: Alert and oriented to self and place only; confused    CBC:   Recent Labs     10/11/21  1516   WBC 10.4   HGB 12.6        BMP:    Recent Labs     10/11/21  1516   *   K 3.6   CL 97*   CO2 24   BUN 16   CREATININE 0.9   GLUCOSE 119*     Troponin:   Recent Labs     10/11/21  1516   TROPONINI <0.01     PT/INR:  No results found for: PTINR  U/A:    Lab Results   Component Value Date    LEUKOCYTESUR Negative 01/14/2019    RBCUA 0-2 12/11/2012    SPECGRAV 1.015 01/14/2019    UROBILINOGEN 0.2 01/14/2019    BILIRUBINUR Negative 01/14/2019    BLOODU Negative 01/14/2019    GLUCOSEU Negative 01/14/2019    PROTEINU Negative 01/14/2019         RAD:   I have independently reviewed and interpreted the imaging studies below and based my recommendations to the patient on those findings. XR CHEST (2 VW)    Result Date: 10/11/2021  EXAMINATION: TWO XRAY VIEWS OF THE CHEST 10/11/2021 3:23 pm COMPARISON: 02/01/2007. HISTORY: ORDERING SYSTEM PROVIDED HISTORY: short of breath TECHNOLOGIST PROVIDED HISTORY: Reason for exam:->short of breath Reason for Exam: Shortness of Breath Acuity: Acute Type of Exam: Initial FINDINGS: The cardiomediastinal silhouette is stable. Aortic vascular calcification. Stable elevated right hemidiaphragm. Bilateral peripheral pulmonary infiltrates most prevalent in the mid and lower lung fields. Some associated linear atelectasis in the right lung base. No pleural fluid is seen. Remote calcified granulomatous disease. No acute osseous findings. Peripheral bilateral pulmonary a opacification, likely multifocal pneumonia including COVID-19 pneumonia. Mild right basilar atelectasis. CT Head WO Contrast    Result Date: 10/11/2021  EXAMINATION: CT OF THE HEAD WITHOUT CONTRAST  10/11/2021 4:20 pm TECHNIQUE: CT of the head was performed without the administration of intravenous contrast. Dose modulation, iterative reconstruction, and/or weight based adjustment of the mA/kV was utilized to reduce the radiation dose to as low as reasonably achievable. COMPARISON: None.  HISTORY: ORDERING SYSTEM PROVIDED HISTORY: HI TECHNOLOGIST PROVIDED HISTORY: Reason for exam:->HI Has a \"code stroke\" or \"stroke alert\" been called? ->No Decision Support Exception - unselect if not a suspected or confirmed emergency medical condition->Emergency Medical Condition (MA) FINDINGS: BRAIN/VENTRICLES: No acute hemorrhage. Periventricular and subcortical hypoattenuation is nonspecific and may be related to microvascular disease. Encephalomalacia in the right basal ganglia. Artifact partially obscures the cyndie. Artifact partially obscures the inferior cerebellum. Truddie Flight white differentiation appears maintained given artifact near the skull base and through the posterior fossa. Bifrontal volume loss with prominence of the frontal horns and 3rd ventricle. There is no midline shift. Basal cisterns appear patent. ORBITS: Visualized orbits appear unremarkable on this unenhanced exam. SINUSES: Mild mucosal thickening of the paranasal sinuses. Visualized mastoid air cells appear clear. SOFT TISSUES/SKULL: No depressed calvarial fracture. No acute hemorrhage or midline shift. Other findings as described. CT CHEST PULMONARY EMBOLISM W CONTRAST    Result Date: 10/11/2021  EXAMINATION: CTA OF THE CHEST 10/11/2021 4:20 pm TECHNIQUE: CTA of the chest was performed after the administration of intravenous contrast.  Multiplanar reformatted images are provided for review. MIP images are provided for review. Dose modulation, iterative reconstruction, and/or weight based adjustment of the mA/kV was utilized to reduce the radiation dose to as low as reasonably achievable. COMPARISON: None.  HISTORY: ORDERING SYSTEM PROVIDED HISTORY: sob, tachy, cannot apply perc TECHNOLOGIST PROVIDED HISTORY: Reason for exam:->sob, tachy, cannot apply perc Decision Support Exception - unselect if not a suspected or confirmed emergency medical condition->Emergency Medical Condition (MA) Reason for Exam: sob, tachy, cannot apply perc Acuity: Acute Type of Exam: Initial FINDINGS: Pulmonary Arteries: Pulmonary arteries are within normal limits in size. . Motion degraded evaluation of the pulmonary arteries. No filling defect is identified in the pulmonary arteries to the level of thelobar arteries. Mediastinum: Heart is upper normal limits in size. No pericardial effusion. Aorta is within normal limits in size. No luminal defect is appreciated in the visualized thoracic aorta. Coronary artery calcifications noted. Lungs/pleura: Central airways are patent. Multifocal ground-glass to confluent airspace disease. No pleural effusion. No pneumothorax. Soft Tissues/Bones: Prominent mediastinal and hilar lymph nodes may be reactive. Calcified mediastinal and right hilar lymph nodes noted. Scattered degenerative changes noted in the visualized spine without spondylolisthesis. Upper Abdomen: Limited images of the upper abdomen are unremarkable. 1. Multifocal ground-glass the confluent airspace disease. Correlate with presentation for pneumonia, including COVID pneumonia. Follow-up to resolution recommended. 2.  Motion degraded evaluation of the pulmonary arteries. No acute pulmonary embolism to the lobar arteries given limitation. 3. Other findings as described. Assessment:   Principal Problem:    Pneumonia  Active Problems:    Pure hypercholesterolemia    Essential hypertension, benign    CKD (chronic kidney disease) stage 3, GFR 30-59 ml/min ([facility])    Acute metabolic encephalopathy  Resolved Problems:    * No resolved hospital problems. *      Plan:           Pneumonia, organism unspecified - Pt has been started on Rocephin and Azithromycin  - Pt tested negative for the COVID-19 virus  - Blood cultures x 2 ordered  - Respiratory culture ordered  - Legionella pneumophilia and Strep pneumoniae urine antigens ordered  - Procalcitonin ordered  - Incentive spirometry ordered  - Will provide high calorie, high protein dietary supplements    Acute metabolic encephalopathy - due to Pneumonia.  Will provide supportive care    Cough - will provide antitussive medications as needed    Generalized weakness/fatigue - We will will ask PT/OT to evaluate and treat patient, and if necessary to provide recommendations for post hospital therapy    Chronic Renal Failure stage III - Renal function is at/near baseline and is stable; Monitor renal function and avoid Nephrotoxic agents as able     Essential (primary) hypertension - continue home meds and monitor blood pressure    Hyperlipidemia - No currently on statin therapy. Defer to PCP          DVT Prophylaxis: Lovenox  Diet: ADULT DIET; Regular  Code Status: Full Code  (Advanced care planning has been discussed with patient and/or responsible family member and is reflected in the code status.  Further orders associated with this have been entered if appropriate)    Disposition: Anticipate that patient will remain in the hospital for 2 to 3+ days depending on further evaluation and clinical course     Please note that over 50 minutes was spent in evaluating the patient, review of records and results, discussion with staff/family, etc.    Darby Perkins MD

## 2021-10-12 NOTE — CARE COORDINATION
INITIAL CASE MANAGEMENT ASSESSMENT    Unable to meet with patient due to isolation status. Spoke with patient's daughter-in-law Alena Solis over the phone, since pt is confused, to assess possible discharge needs. Explained Case Management role/services. Living Situation: verified address, lives in an apartment on the 2nd floor, normally no issues with the steps but recently has been SOB, youngest son is currently living with her    ADLs: prior to getting sick very independent     DME: grab bars in bathroom    PT/OT Recs: PT \"Date of Service: 10/12/2021     Discharge Recommendations:  24 hour supervision or assist \"  AM-PAC 20/24    OT pending     Transportation: active , jimmyin-law is hoping to be dc from rehab & be the one to take her home at dc but if not, family should be able to     Medications: no barriers, uses [facility], will use retail pharmacy for this hospital stay    PCP: has fired last two providers, when more alert to current situation can discuss finding a new provider    PLAN/COMMENTS: denies any needs, daughter-in-law is ok with some therapy at home, emailed home care list to [email or web address]. The Plan for Transition of Care is related to the following treatment goals: strengthening at home    The Patient daughter-in-law Alena Solis was provided with a choice of provider and agrees with the discharge plan. [x] Yes [] No    Freedom of choice list was provided with basic dialogue that supports the patient's individualized plan of care/goals, treatment preferences and shares the quality data associated with the providers. [x] Yes [] No      CM provided contact information for patient or family to call with any questions. CM will follow and assist as needed.     Yenni Clifford RN, BSN, Case Management  [phone]  Electronically signed by Yenni Clifford RN on 10/12/2021 at 12:35 PM

## 2021-10-12 NOTE — PROGRESS NOTES
Hospitalist Progress Note      PCP: Cristino Sandhoff    Date of Admission: 10/11/2021    Chief Complaint: SOB    Hospital Course:      Subjective: SOB improved       Medications:  Reviewed    Infusion Medications    sodium chloride       Scheduled Medications    amLODIPine  10 mg Oral Daily    sodium chloride flush  5-40 mL IntraVENous 2 times per day    enoxaparin  30 mg SubCUTAneous BID    famotidine  20 mg Oral BID    melatonin  3 mg Oral Nightly    cefTRIAXone (ROCEPHIN) IV  1,000 mg IntraVENous Q24H    azithromycin  500 mg IntraVENous Q24H     PRN Meds: sodium chloride flush, sodium chloride, ondansetron **OR** ondansetron, polyethylene glycol, acetaminophen **OR** acetaminophen, albuterol sulfate HFA, benzonatate      Intake/Output Summary (Last 24 hours) at 10/12/2021 1839  Last data filed at 10/12/2021 1333  Gross per 24 hour   Intake 480 ml   Output 200 ml   Net 280 ml       Exam:    BP 98/65   Pulse 88   Temp 97.9 °F (36.6 °C) (Oral)   Resp 16   Ht 5' 4\" (1.626 m)   Wt 129 lb 6.6 oz (58.7 kg)   SpO2 92%   BMI 22.21 kg/m²     General appearance: No apparent distress, appears stated age and cooperative. HEENT: Pupils equal, round, and reactive to light. Conjunctivae/corneas clear. Neck: Supple, with full range of motion. No jugular venous distention. Trachea midline. Respiratory:  Normal respiratory effort. Clear to auscultation, bilaterally without Rales/Wheezes/Rhonchi. Cardiovascular: Regular rate and rhythm with normal S1/S2 without murmurs, rubs or gallops. Abdomen: Soft, non-tender, non-distended with normal bowel sounds. Musculoskeletal: No clubbing, cyanosis or edema bilaterally. Full range of motion without deformity. Skin: Skin color, texture, turgor normal.  No rashes or lesions. Neurologic:  Neurovascularly intact without any focal sensory/motor deficits.  Cranial nerves: II-XII intact, grossly non-focal.  Psychiatric: Alert and oriented, thought content appropriate, normal insight  Capillary Refill: Brisk,< 3 seconds   Peripheral Pulses: +2 palpable, equal bilaterally       Labs:   Recent Labs     10/11/21  1516 10/12/21  0753   WBC 10.4 7.4   HGB 12.6 13.0   HCT 38.1 39.2    437     Recent Labs     10/11/21  1516 10/12/21  0753   * 133*   K 3.6 4.1   CL 97* 98*   CO2 24 19*   BUN 16 16   CREATININE 0.9 0.8   CALCIUM 9.4 9.5     Recent Labs     10/11/21  1516 10/12/21  0753   AST 23 24   ALT 19 19   BILITOT 0.7 0.4   ALKPHOS 115 114     No results for input(s): INR in the last 72 hours. Recent Labs     10/11/21  1516   TROPONINI <0.01       Urinalysis:      Lab Results   Component Value Date    NITRU Negative 10/12/2021    WBCUA 5 10/12/2021    BACTERIA 1+ 12/11/2012    RBCUA 2 10/12/2021    BLOODU Negative 10/12/2021    SPECGRAV 1.010 10/12/2021    GLUCOSEU Negative 10/12/2021       Radiology:  CT CHEST PULMONARY EMBOLISM W CONTRAST   Final Result   1. Multifocal ground-glass the confluent airspace disease. Correlate with   presentation for pneumonia, including COVID pneumonia. Follow-up to   resolution recommended. 2.  Motion degraded evaluation of the pulmonary arteries. No acute pulmonary   embolism to the lobar arteries given limitation. 3. Other findings as described. CT Head WO Contrast   Final Result   No acute hemorrhage or midline shift. Other findings as described. XR CHEST (2 VW)   Final Result   Peripheral bilateral pulmonary a opacification, likely multifocal pneumonia   including COVID-19 pneumonia. Mild right basilar atelectasis.                  Assessment/Plan:    Active Hospital Problems    Diagnosis Date Noted    Pneumonia [J18.9] 10/12/2021    Acute metabolic encephalopathy [G88.44] 10/11/2021    CKD (chronic kidney disease) stage 3, GFR 30-59 ml/min ([facility]) [N18.30] 01/21/2016    Essential hypertension, benign [I10] 09/16/2013    Pure hypercholesterolemia [E78.00]        Pneumonia, likely pneumonia - Likely covid given indeterminate test results and appearance on CXR  - Blood cultures x 2 ordered  - Respiratory culture ordered  - Legionella pneumophilia and Strep pneumoniae urine antigens ordered  - Incentive spirometry ordered  - Pt was empirically started on Rocephin and Azithromycin initially   - can d/c as procalcitonin negative     Acute metabolic encephalopathy - due to Pneumonia. Will provide supportive care     Cough - will provide antitussive medications as needed     Generalized weakness/fatigue - We will will ask PT/OT to evaluate and treat patient, and if necessary to provide recommendations for post hospital therapy     Chronic Renal Failure stage III - Renal function is at/near baseline and is stable; Monitor renal function and avoid Nephrotoxic agents as able      Essential (primary) hypertension - continue home meds and monitor blood pressure     Hyperlipidemia - No currently on statin therapy. Defer to PCP    DVT Prophylaxis: lovenox  Diet: ADULT DIET;  Regular  Code Status: Full Code    PT/OT Eval Status: n/a    Dispo - Medsurg, can d/c tomorrow if feeling ok and without hypoxia    Claire Chu MD

## 2021-10-13 VITALS
RESPIRATION RATE: 16 BRPM | WEIGHT: 123.9 LBS | SYSTOLIC BLOOD PRESSURE: 152 MMHG | HEART RATE: 84 BPM | TEMPERATURE: 97.7 F | HEIGHT: 64 IN | DIASTOLIC BLOOD PRESSURE: 90 MMHG | BODY MASS INDEX: 21.15 KG/M2 | OXYGEN SATURATION: 95 %

## 2021-10-13 LAB
A/G RATIO: 0.7 (ref 1.1–2.2)
ALBUMIN SERPL-MCNC: 3.1 G/DL (ref 3.4–5)
ALP BLD-CCNC: 104 U/L (ref 40–129)
ALT SERPL-CCNC: 20 U/L (ref 10–40)
ANION GAP SERPL CALCULATED.3IONS-SCNC: 11 MMOL/L (ref 3–16)
AST SERPL-CCNC: 23 U/L (ref 15–37)
BASOPHILS ABSOLUTE: 0 K/UL (ref 0–0.2)
BASOPHILS RELATIVE PERCENT: 0.2 %
BILIRUB SERPL-MCNC: 0.4 MG/DL (ref 0–1)
BUN BLDV-MCNC: 16 MG/DL (ref 7–20)
CALCIUM SERPL-MCNC: 9.6 MG/DL (ref 8.3–10.6)
CHLORIDE BLD-SCNC: 102 MMOL/L (ref 99–110)
CO2: 25 MMOL/L (ref 21–32)
CREAT SERPL-MCNC: 0.8 MG/DL (ref 0.6–1.2)
EOSINOPHILS ABSOLUTE: 0 K/UL (ref 0–0.6)
EOSINOPHILS RELATIVE PERCENT: 0 %
GFR AFRICAN AMERICAN: >60
GFR NON-AFRICAN AMERICAN: >60
GLOBULIN: 4.6 G/DL
GLUCOSE BLD-MCNC: 96 MG/DL (ref 70–99)
HCT VFR BLD CALC: 37.8 % (ref 36–48)
HEMOGLOBIN: 12.4 G/DL (ref 12–16)
LYMPHOCYTES ABSOLUTE: 1.9 K/UL (ref 1–5.1)
LYMPHOCYTES RELATIVE PERCENT: 13.9 %
MCH RBC QN AUTO: 30.1 PG (ref 26–34)
MCHC RBC AUTO-ENTMCNC: 32.7 G/DL (ref 31–36)
MCV RBC AUTO: 92 FL (ref 80–100)
MONOCYTES ABSOLUTE: 0.5 K/UL (ref 0–1.3)
MONOCYTES RELATIVE PERCENT: 3.8 %
NEUTROPHILS ABSOLUTE: 11.1 K/UL (ref 1.7–7.7)
NEUTROPHILS RELATIVE PERCENT: 82.1 %
PDW BLD-RTO: 14.4 % (ref 12.4–15.4)
PLATELET # BLD: 467 K/UL (ref 135–450)
PMV BLD AUTO: 7.9 FL (ref 5–10.5)
POTASSIUM REFLEX MAGNESIUM: 3.7 MMOL/L (ref 3.5–5.1)
PROCALCITONIN: 0.06 NG/ML (ref 0–0.15)
RBC # BLD: 4.11 M/UL (ref 4–5.2)
SODIUM BLD-SCNC: 138 MMOL/L (ref 136–145)
TOTAL PROTEIN: 7.7 G/DL (ref 6.4–8.2)
WBC # BLD: 13.5 K/UL (ref 4–11)

## 2021-10-13 PROCEDURE — 85025 COMPLETE CBC W/AUTO DIFF WBC: CPT

## 2021-10-13 PROCEDURE — 97530 THERAPEUTIC ACTIVITIES: CPT

## 2021-10-13 PROCEDURE — 94760 N-INVAS EAR/PLS OXIMETRY 1: CPT

## 2021-10-13 PROCEDURE — 2580000003 HC RX 258: Performed by: INTERNAL MEDICINE

## 2021-10-13 PROCEDURE — 97535 SELF CARE MNGMENT TRAINING: CPT

## 2021-10-13 PROCEDURE — 84145 PROCALCITONIN (PCT): CPT

## 2021-10-13 PROCEDURE — 36415 COLL VENOUS BLD VENIPUNCTURE: CPT

## 2021-10-13 PROCEDURE — 6370000000 HC RX 637 (ALT 250 FOR IP): Performed by: INTERNAL MEDICINE

## 2021-10-13 PROCEDURE — 80053 COMPREHEN METABOLIC PANEL: CPT

## 2021-10-13 RX ADMIN — Medication 10 ML: at 08:34

## 2021-10-13 RX ADMIN — FAMOTIDINE 20 MG: 20 TABLET ORAL at 08:34

## 2021-10-13 RX ADMIN — AMLODIPINE BESYLATE 10 MG: 10 TABLET ORAL at 08:34

## 2021-10-13 ASSESSMENT — PAIN SCALES - GENERAL: PAINLEVEL_OUTOF10: 0

## 2021-10-13 NOTE — PROGRESS NOTES
Pt only oriented to self, thinks she is going home today and trying to call her daugther in law to come get her. Refusing telemetry at this time. Tele cam in room.  Pt up to chair to eat breakfast.

## 2021-10-13 NOTE — CARE COORDINATION
CASE MANAGEMENT DISCHARGE SUMMARY:    DISCHARGE DATE: 10/13/21    DISCHARGED TO: home with son     TRANSPORTATION: son Sreedhar Kelsey: around 0    Emailed Mitch  list to daughter-in-law yesterday, she has not had time to look over list yet. She is aware of dc today & that [employer] will be up around 1600.     Amy Wilson RN, BSN, Case Management  [phone]    Electronically signed by Amy Wilson RN on 10/13/2021 at 3:25 PM

## 2021-10-13 NOTE — PROGRESS NOTES
Progress Note  Admit Date: 10/11/2021      PCP: Rachelle Frias     CC: F/U for confusion    Days in hospital:  2    SUBJECTIVE / Interval History:  Patient is tearful confused oriented to self        Allergies  Morphine, Vicodin [hydrocodone-acetaminophen], Meclizine, Zetia [ezetimibe], and Lipitor [atorvastatin]    Medications    Scheduled Meds:   amLODIPine  10 mg Oral Daily    sodium chloride flush  5-40 mL IntraVENous 2 times per day    enoxaparin  30 mg SubCUTAneous BID    famotidine  20 mg Oral BID    melatonin  3 mg Oral Nightly     Continuous Infusions:   sodium chloride         PRN Meds:  sodium chloride flush, sodium chloride, ondansetron **OR** ondansetron, polyethylene glycol, acetaminophen **OR** acetaminophen, albuterol sulfate HFA, benzonatate    Vitals    BP (!) 152/90   Pulse 84   Temp 97.7 °F (36.5 °C) (Oral)   Resp 16   Ht 5' 4\" (1.626 m)   Wt 123 lb 14.4 oz (56.2 kg)   SpO2 95%   BMI 21.27 kg/m²     Exam:    Gen: No distress. Eyes: PERRL. No sclera icterus. No conjunctival injection. ENT: No discharge. Pharynx clear. External appearance of ears and nose normal.  Neck: Trachea midline. No obvious mass. Resp: No accessory muscle use. No crackles. No wheezes. No rhonchi. No dullness on percussion. CV: Regular rate. Regular rhythm. No murmur or rub. No edema. GI: Non-tender. Non-distended. No hernia. Skin: Warm, dry, normal texture and turgor. No nodule on exposed extremities. Lymph: No cervical LAD. No supraclavicular LAD. M/S: No cyanosis. No clubbing. No joint deformity. Neuro: Moves all four extremities. CN 2-12 tested, no defect noted. Psych: Oriented x 1.+anxiety. Awake.    Data    LABS  CBC:   Recent Labs     10/11/21  1516 10/12/21  0753 10/13/21  0815   WBC 10.4 7.4 13.5*   HGB 12.6 13.0 12.4   HCT 38.1 39.2 37.8   MCV 91.2 91.5 92.0    437 467*     BMP:   Recent Labs     10/11/21  1516 10/12/21  0753 10/13/21  0815   * 133* 138   K 3.6 4.1 3.7   CL 97* 98* 102   CO2 24 19* 25   BUN 16 16 16   CREATININE 0.9 0.8 0.8   GLUCOSE 119* 135* 96     POC GLUCOSE:  No results for input(s): POCGLU in the last 72 hours. LIVER PROFILE:   Recent Labs     10/11/21  1516 10/12/21  0753 10/13/21  0815   AST 23 24 23   ALT 19 19 20   LABALBU 3.2* 2.8* 3.1*   BILITOT 0.7 0.4 0.4   ALKPHOS 115 114 104     PT/INR: No results for input(s): PROTIME, INR in the last 72 hours. APTT: No results for input(s): APTT in the last 72 hours. UA:  Recent Labs     10/12/21  1340   COLORU Yellow   PHUR 6.0   WBCUA 5   RBCUA 2   CLARITYU Clear   SPECGRAV 1.010   LEUKOCYTESUR TRACE*   UROBILINOGEN 0.2   BILIRUBINUR Negative   BLOODU Negative   GLUCOSEU Negative   KETUA Negative     Microbiology:  Wound Culture: No results for input(s): WNDABS, ORG in the last 72 hours. Invalid input(s):  LABGRAM  Nasal Culture: No results for input(s): ORG, MRSAPCR in the last 72 hours. Blood Culture:   Recent Labs     10/11/21  1827   BC No Growth to date. Any change in status will be called. BLOODCULT2 No Growth to date. Any change in status will be called. Fungal Culture:   No results for input(s): FUNGSM in the last 72 hours. No results for input(s): FUNCXBLD in the last 72 hours. CSF Culture:  No results for input(s): COLORCSF, APPEARCSF, CFTUBE, CLOTCSF, WBCCSF, RBCCSF, NEUTCSF, NUMCELLSCSF, LYMPHSCSF, MONOCSF, GLUCCSF, VOLCSF in the last 72 hours. Respiratory Culture:  No results for input(s): Param Grammes in the last 72 hours. AFB:No results for input(s): AFBSMEAR in the last 72 hours. Urine Culture  No results for input(s): LABURIN in the last 72 hours. RADIOLOGY:    CT CHEST PULMONARY EMBOLISM W CONTRAST   Final Result   1. Multifocal ground-glass the confluent airspace disease. Correlate with   presentation for pneumonia, including COVID pneumonia. Follow-up to   resolution recommended. 2.  Motion degraded evaluation of the pulmonary arteries.  No acute meds      Diet: ADULT DIET; Regular  Code Status: Full Code        Discharge plan -continue care    The patient and / or the family were informed of the results of any tests, a time was given to answer questions, a plan was proposed and they agreed with plan. Discussed with consulting physicians, nursing and social work     The note was completed using EMR. Every effort was made to ensure accuracy; however, inadvertent computerized transcription errors may be present.        Kamila Fink MD

## 2021-10-13 NOTE — PROGRESS NOTES
Patient alert to self only and pleasant . Occasional hallucinations but easily redirectable. One attempt out of bed for bathroom. Ambulates SBAx1 tolerates well. Pulls at telemetry leads, but is leaving IV in place. telecamera in room, call light within reach, bed in lowest position with bed alarm on.  Electronically signed by Sherwin Koch RN on 10/13/2021 at 1:21 AM

## 2021-10-13 NOTE — PLAN OF CARE
Problem: Falls - Risk of:  Goal: Will remain free from falls  Description: Will remain free from falls  10/12/2021 2200 by Carlyle Smith RN  Outcome: Ongoing  10/12/2021 1020 by Daina Alonzo RN  Outcome: Ongoing  Goal: Absence of physical injury  Description: Absence of physical injury  10/12/2021 2200 by Carlyle Smith RN  Outcome: Ongoing  10/12/2021 1020 by Daina Alonzo RN  Outcome: Ongoing     Problem: Confusion - Acute:  Goal: Absence of continued neurological deterioration signs and symptoms  Description: Absence of continued neurological deterioration signs and symptoms  10/12/2021 2200 by Carlyle Smith RN  Outcome: Ongoing  10/12/2021 1020 by Daina Alonzo RN  Outcome: Ongoing  Goal: Mental status will be restored to baseline  Description: Mental status will be restored to baseline  10/12/2021 2200 by Carlyle Smith RN  Outcome: Ongoing  10/12/2021 1020 by Daina Alonzo RN  Outcome: Ongoing     Problem: Discharge Planning:  Goal: Ability to perform activities of daily living will improve  Description: Ability to perform activities of daily living will improve  10/12/2021 2200 by Carlyle Smith RN  Outcome: Ongoing  10/12/2021 1020 by Daina Alonzo RN  Outcome: Ongoing  Goal: Participates in care planning  Description: Participates in care planning  10/12/2021 2200 by Carlyle Smith RN  Outcome: Ongoing  10/12/2021 1020 by Daina Alonzo RN  Outcome: Ongoing     Problem: Injury - Risk of, Physical Injury:  Goal: Will remain free from falls  Description: Will remain free from falls  10/12/2021 2200 by Carlyle Smith RN  Outcome: Ongoing  10/12/2021 1020 by Daina Alonzo RN  Outcome: Ongoing  Goal: Absence of physical injury  Description: Absence of physical injury  10/12/2021 2200 by Carlyle Smith RN  Outcome: Ongoing  10/12/2021 1020 by Daina Alonzo RN  Outcome: Ongoing     Problem: Mood - Altered:  Goal: Mood stable  Description: Mood stable  10/12/2021 2200 by Ivana Hamilton RN  Outcome: Ongoing  10/12/2021 1020 by Gwyn Gutierrez RN  Outcome: Ongoing  Goal: Absence of abusive behavior  Description: Absence of abusive behavior  10/12/2021 2200 by Ivana Hamilton RN  Outcome: Ongoing  10/12/2021 1020 by Gwyn Gutierrez RN  Outcome: Ongoing  Goal: Verbalizations of feeling emotionally comfortable while being cared for will increase  Description: Verbalizations of feeling emotionally comfortable while being cared for will increase  10/12/2021 2200 by Ivana Hamilton RN  Outcome: Ongoing  10/12/2021 1020 by Gwyn Gutierrez RN  Outcome: Ongoing     Problem: Psychomotor Activity - Altered:  Goal: Absence of psychomotor disturbance signs and symptoms  Description: Absence of psychomotor disturbance signs and symptoms  10/12/2021 2200 by Ivana Hamilton RN  Outcome: Ongoing  10/12/2021 1020 by Gwyn Gutierrez RN  Outcome: Ongoing     Problem: Sensory Perception - Impaired:  Goal: Demonstrations of improved sensory functioning will increase  Description: Demonstrations of improved sensory functioning will increase  10/12/2021 2200 by Ivana Hamilton RN  Outcome: Ongoing  10/12/2021 1020 by Gwyn Gutierrez RN  Outcome: Ongoing  Goal: Decrease in sensory misperception frequency  Description: Decrease in sensory misperception frequency  10/12/2021 2200 by Ivana Hamilton RN  Outcome: Ongoing  10/12/2021 1020 by Gwyn Gutierrez RN  Outcome: Ongoing  Goal: Able to refrain from responding to false sensory perceptions  Description: Able to refrain from responding to false sensory perceptions  10/12/2021 2200 by Ivana Hamilton RN  Outcome: Ongoing  10/12/2021 1020 by Gwyn Gutierrez RN  Outcome: Ongoing  Goal: Demonstrates accurate environmental perceptions  Description: Demonstrates accurate environmental perceptions  10/12/2021 2200 by Ivana Hamilton RN  Outcome: Ongoing  10/12/2021 1020 by Ludmila Welch RN  Outcome: Ongoing  Goal: Able to distinguish between reality-based and nonreality-based thinking  Description: Able to distinguish between reality-based and nonreality-based thinking  10/12/2021 2200 by Patrick Holcomb RN  Outcome: Ongoing  10/12/2021 1020 by Ludmila Welch RN  Outcome: Ongoing  Goal: Able to interrupt nonreality-based thinking  Description: Able to interrupt nonreality-based thinking  10/12/2021 2200 by Patrick Holcomb RN  Outcome: Ongoing  10/12/2021 1020 by Ludmila Welch RN  Outcome: Ongoing     Problem: Sleep Pattern Disturbance:  Goal: Appears well-rested  Description: Appears well-rested  10/12/2021 2200 by Patrick Holcomb RN  Outcome: Ongoing  10/12/2021 1020 by Ludmila Welch RN  Outcome: Ongoing     Problem: Airway Clearance - Ineffective  Goal: Achieve or maintain patent airway  10/12/2021 2200 by Patrick Holcomb RN  Outcome: Ongoing  10/12/2021 1020 by Ludmila Welch RN  Outcome: Ongoing     Problem: Gas Exchange - Impaired  Goal: Absence of hypoxia  10/12/2021 2200 by Patrick Holcomb RN  Outcome: Ongoing  10/12/2021 1020 by Ludmila Welch RN  Outcome: Ongoing  Goal: Promote optimal lung function  10/12/2021 2200 by Patrick Holcomb RN  Outcome: Ongoing  10/12/2021 1020 by Ludmila Welch RN  Outcome: Ongoing     Problem: Breathing Pattern - Ineffective  Goal: Ability to achieve and maintain a regular respiratory rate  10/12/2021 2200 by Patrick Holcomb RN  Outcome: Ongoing  10/12/2021 1020 by Ludmila Welch RN  Outcome: Ongoing     Problem:  Body Temperature -  Risk of, Imbalanced  Goal: Ability to maintain a body temperature within defined limits  10/12/2021 2200 by Patrick Holcomb RN  Outcome: Ongoing  10/12/2021 1020 by Ludmila Welch RN  Outcome: Ongoing  Goal: Will regain or maintain usual level of consciousness  10/12/2021 2200 by Patrick Holcomb RN  Outcome: Ongoing  10/12/2021 1020 by

## 2021-10-13 NOTE — CARE COORDINATION
Spoke with grandson, Yulisa Dunn, & daughter-in-law, Aung, regarding pts current confusion. She is confused at baseline that waxes & wanes throughout the day. Dr. Nixon Velasco updated.     Dorothy Carrera RN, BSN,   [phone]  Electronically signed by Dorothy Carrera RN on 10/13/2021 at 12:50 PM

## 2021-10-13 NOTE — PLAN OF CARE
Problem: Falls - Risk of:  Goal: Will remain free from falls  Description: Will remain free from falls  10/13/2021 1154 by Jose Heath RN  Outcome: Ongoing  10/12/2021 2200 by Jennifer Vincent RN  Outcome: Ongoing  Goal: Absence of physical injury  Description: Absence of physical injury  10/13/2021 1154 by Jose Heath RN  Outcome: Ongoing  10/12/2021 2200 by Jennifer Vincent RN  Outcome: Ongoing     Problem: Confusion - Acute:  Goal: Absence of continued neurological deterioration signs and symptoms  Description: Absence of continued neurological deterioration signs and symptoms  10/13/2021 1154 by Jose Heath RN  Outcome: Ongoing  10/12/2021 2200 by Jennifer Vincent RN  Outcome: Ongoing  Goal: Mental status will be restored to baseline  Description: Mental status will be restored to baseline  10/13/2021 1154 by Jose Heath RN  Outcome: Ongoing  10/12/2021 2200 by Jennifer Vincent RN  Outcome: Ongoing     Problem: Discharge Planning:  Goal: Ability to perform activities of daily living will improve  Description: Ability to perform activities of daily living will improve  10/13/2021 1154 by Jose Heath RN  Outcome: Ongoing  10/12/2021 2200 by Jennifer Vincent RN  Outcome: Ongoing  Goal: Participates in care planning  Description: Participates in care planning  10/13/2021 1154 by Jose Heath RN  Outcome: Ongoing  10/12/2021 2200 by Jennifer Vincent RN  Outcome: Ongoing     Problem: Injury - Risk of, Physical Injury:  Goal: Will remain free from falls  Description: Will remain free from falls  10/13/2021 1154 by Jose Heath RN  Outcome: Ongoing  10/12/2021 2200 by Jennifer Vincent RN  Outcome: Ongoing  Goal: Absence of physical injury  Description: Absence of physical injury  10/13/2021 1154 by Jose Heath RN  Outcome: Ongoing  10/12/2021 2200 by Jennifer Vincent RN  Outcome: Ongoing     Problem: Mood - Altered:  Goal: Mood stable  Description: Mood stable  10/13/2021 1154 by Viktoria Kendall RN  Outcome: Ongoing  10/12/2021 2200 by Maira Brito RN  Outcome: Ongoing  Goal: Absence of abusive behavior  Description: Absence of abusive behavior  10/13/2021 1154 by Viktoria Kendall RN  Outcome: Ongoing  10/12/2021 2200 by Maira Brito RN  Outcome: Ongoing  Goal: Verbalizations of feeling emotionally comfortable while being cared for will increase  Description: Verbalizations of feeling emotionally comfortable while being cared for will increase  10/13/2021 1154 by Viktoria Kendall RN  Outcome: Ongoing  10/12/2021 2200 by Maira Brito RN  Outcome: Ongoing     Problem: Sensory Perception - Impaired:  Goal: Demonstrations of improved sensory functioning will increase  Description: Demonstrations of improved sensory functioning will increase  10/13/2021 1154 by Viktoria Kendall RN  Outcome: Ongoing  10/12/2021 2200 by Maira Brito RN  Outcome: Ongoing  Goal: Decrease in sensory misperception frequency  Description: Decrease in sensory misperception frequency  10/13/2021 1154 by Viktoria Kendall RN  Outcome: Ongoing  10/12/2021 2200 by Maira Brito RN  Outcome: Ongoing  Goal: Able to refrain from responding to false sensory perceptions  Description: Able to refrain from responding to false sensory perceptions  10/13/2021 1154 by Viktoria Kendall RN  Outcome: Ongoing  10/12/2021 2200 by Maira Brito RN  Outcome: Ongoing  Goal: Demonstrates accurate environmental perceptions  Description: Demonstrates accurate environmental perceptions  10/13/2021 1154 by Viktoria Kendall RN  Outcome: Ongoing  10/12/2021 2200 by Maira Brito RN  Outcome: Ongoing  Goal: Able to distinguish between reality-based and nonreality-based thinking  Description: Able to distinguish between reality-based and nonreality-based thinking  10/13/2021 1154 by Viktoria Kendall RN  Outcome: Ongoing  10/12/2021 2200 by Tom Drake RN  Outcome: Ongoing  Goal: Able to interrupt nonreality-based thinking  Description: Able to interrupt nonreality-based thinking  10/13/2021 1154 by Lai Snyder RN  Outcome: Ongoing  10/12/2021 2200 by Tom Drake RN  Outcome: Ongoing     Problem: Airway Clearance - Ineffective  Goal: Achieve or maintain patent airway  10/13/2021 1154 by Lai Snyder RN  Outcome: Ongoing  10/12/2021 2200 by Tom Draek RN  Outcome: Ongoing     Problem: Gas Exchange - Impaired  Goal: Absence of hypoxia  10/13/2021 1154 by Lai Snyder RN  Outcome: Ongoing  10/12/2021 2200 by Tom Drake RN  Outcome: Ongoing  Goal: Promote optimal lung function  10/13/2021 1154 by Lai Snyder RN  Outcome: Ongoing  10/12/2021 2200 by Tom Drake RN  Outcome: Ongoing     Problem: Breathing Pattern - Ineffective  Goal: Ability to achieve and maintain a regular respiratory rate  10/13/2021 1154 by Lai Snyder RN  Outcome: Ongoing  10/12/2021 2200 by Tom Drake RN  Outcome: Ongoing

## 2021-10-13 NOTE — PROGRESS NOTES
IV removed. Telemetry removed. Agitated and combative at discharge. Refused to sign discharge papers. Wheeled to ER entrance where staff witnessed her son Liset Bush pick her up.

## 2021-10-15 LAB
BLOOD CULTURE, ROUTINE: NORMAL
CULTURE, BLOOD 2: NORMAL

## 2021-10-15 NOTE — DISCHARGE SUMMARY
pneumonia due to Covid. She did not qualify for any treatment. At the time of discharge patient is at her baseline which is confused      Consults:     None        Disposition: home    Discharged Condition: Stable    Code Status: Prior    Activity: activity as tolerated    Diet: regular diet      Labs: For convenience and continuity at follow-up the following most recent labs are provided:    CBC:   Lab Results   Component Value Date    WBC 13.5 10/13/2021    HGB 12.4 10/13/2021    HCT 37.8 10/13/2021     10/13/2021       RENAL:   Lab Results   Component Value Date     10/13/2021    K 3.7 10/13/2021     10/13/2021    CO2 25 10/13/2021    BUN 16 10/13/2021    CREATININE 0.8 10/13/2021           Discharge Medications:    Bette Donato   Home Medication Instructions [id number]    Printed on:10/15/21 8197   Medication Information                      amLODIPine (NORVASC) 10 MG tablet  Take 10 mg by mouth daily                 No future appointments. Time Spent on discharge is more than 30 minutes in the examination, evaluation, counseling and review of medications and discharge plan. Signed:  Kamila Fink MD   10/15/2021    The note was completed using EMR. Every effort was made to ensure accuracy; however, inadvertent computerized transcription errors may be present. Thank you Solo Au for the opportunity to be involved in this patient's care. If you have any questions or concerns please feel free to contact me at [phone].

## 2022-08-23 ENCOUNTER — APPOINTMENT (OUTPATIENT)
Dept: CT IMAGING | Age: 87
DRG: 481 | End: 2022-08-23
Payer: MEDICARE

## 2022-08-23 ENCOUNTER — APPOINTMENT (OUTPATIENT)
Dept: GENERAL RADIOLOGY | Age: 87
DRG: 481 | End: 2022-08-23
Payer: MEDICARE

## 2022-08-23 ENCOUNTER — HOSPITAL ENCOUNTER (INPATIENT)
Age: 87
LOS: 3 days | Discharge: NURSING, REHAB OR LONG-TERM CARE | DRG: 481 | End: 2022-08-26
Attending: STUDENT IN AN ORGANIZED HEALTH CARE EDUCATION/TRAINING PROGRAM | Admitting: STUDENT IN AN ORGANIZED HEALTH CARE EDUCATION/TRAINING PROGRAM
Payer: MEDICARE

## 2022-08-23 DIAGNOSIS — S42.291A OTHER CLOSED DISPLACED FRACTURE OF PROXIMAL END OF RIGHT HUMERUS, INITIAL ENCOUNTER: ICD-10-CM

## 2022-08-23 DIAGNOSIS — S72.001A CLOSED FRACTURE OF RIGHT HIP, INITIAL ENCOUNTER (HCC): Primary | ICD-10-CM

## 2022-08-23 DIAGNOSIS — S42.201A CLOSED FRACTURE OF PROXIMAL END OF RIGHT HUMERUS, UNSPECIFIED FRACTURE MORPHOLOGY, INITIAL ENCOUNTER: ICD-10-CM

## 2022-08-23 LAB
ABO/RH: NORMAL
ANION GAP SERPL CALCULATED.3IONS-SCNC: 12 MMOL/L (ref 3–16)
ANTIBODY SCREEN: NORMAL
BACTERIA: NORMAL /HPF
BASOPHILS ABSOLUTE: 0 K/UL (ref 0–0.2)
BASOPHILS RELATIVE PERCENT: 0.2 %
BILIRUBIN URINE: NEGATIVE
BLOOD, URINE: ABNORMAL
BUN BLDV-MCNC: 21 MG/DL (ref 7–20)
CALCIUM SERPL-MCNC: 9.6 MG/DL (ref 8.3–10.6)
CHLORIDE BLD-SCNC: 98 MMOL/L (ref 99–110)
CLARITY: CLEAR
CO2: 26 MMOL/L (ref 21–32)
COLOR: YELLOW
CREAT SERPL-MCNC: 1 MG/DL (ref 0.6–1.2)
EOSINOPHILS ABSOLUTE: 0.1 K/UL (ref 0–0.6)
EOSINOPHILS RELATIVE PERCENT: 0.5 %
EPITHELIAL CELLS, UA: 1 /HPF (ref 0–5)
GFR AFRICAN AMERICAN: >60
GFR NON-AFRICAN AMERICAN: 53
GLUCOSE BLD-MCNC: 124 MG/DL (ref 70–99)
GLUCOSE URINE: NEGATIVE MG/DL
HCT VFR BLD CALC: 38.2 % (ref 36–48)
HEMOGLOBIN: 12.5 G/DL (ref 12–16)
HYALINE CASTS: 2 /LPF (ref 0–8)
INR BLD: 1.03 (ref 0.87–1.14)
KETONES, URINE: NEGATIVE MG/DL
LEUKOCYTE ESTERASE, URINE: NEGATIVE
LYMPHOCYTES ABSOLUTE: 0.9 K/UL (ref 1–5.1)
LYMPHOCYTES RELATIVE PERCENT: 6.7 %
MCH RBC QN AUTO: 28.5 PG (ref 26–34)
MCHC RBC AUTO-ENTMCNC: 32.7 G/DL (ref 31–36)
MCV RBC AUTO: 87.2 FL (ref 80–100)
MICROSCOPIC EXAMINATION: YES
MONOCYTES ABSOLUTE: 0.7 K/UL (ref 0–1.3)
MONOCYTES RELATIVE PERCENT: 5.3 %
NEUTROPHILS ABSOLUTE: 12 K/UL (ref 1.7–7.7)
NEUTROPHILS RELATIVE PERCENT: 87.3 %
NITRITE, URINE: NEGATIVE
PDW BLD-RTO: 15.7 % (ref 12.4–15.4)
PH UA: 5 (ref 5–8)
PLATELET # BLD: 264 K/UL (ref 135–450)
PMV BLD AUTO: 7.6 FL (ref 5–10.5)
POTASSIUM REFLEX MAGNESIUM: 3.6 MMOL/L (ref 3.5–5.1)
PROTEIN UA: NEGATIVE MG/DL
PROTHROMBIN TIME: 13.4 SEC (ref 11.7–14.5)
RBC # BLD: 4.38 M/UL (ref 4–5.2)
RBC UA: 1 /HPF (ref 0–4)
SODIUM BLD-SCNC: 136 MMOL/L (ref 136–145)
SPECIFIC GRAVITY UA: 1.01 (ref 1–1.03)
URINE REFLEX TO CULTURE: ABNORMAL
URINE TYPE: ABNORMAL
UROBILINOGEN, URINE: 0.2 E.U./DL
WBC # BLD: 13.8 K/UL (ref 4–11)
WBC UA: 1 /HPF (ref 0–5)

## 2022-08-23 PROCEDURE — 85025 COMPLETE CBC W/AUTO DIFF WBC: CPT

## 2022-08-23 PROCEDURE — 73502 X-RAY EXAM HIP UNI 2-3 VIEWS: CPT

## 2022-08-23 PROCEDURE — 90471 IMMUNIZATION ADMIN: CPT | Performed by: PHYSICIAN ASSISTANT

## 2022-08-23 PROCEDURE — 71045 X-RAY EXAM CHEST 1 VIEW: CPT

## 2022-08-23 PROCEDURE — 6360000002 HC RX W HCPCS: Performed by: PHYSICIAN ASSISTANT

## 2022-08-23 PROCEDURE — 73030 X-RAY EXAM OF SHOULDER: CPT

## 2022-08-23 PROCEDURE — 6360000002 HC RX W HCPCS: Performed by: NURSE PRACTITIONER

## 2022-08-23 PROCEDURE — 93005 ELECTROCARDIOGRAM TRACING: CPT | Performed by: PHYSICIAN ASSISTANT

## 2022-08-23 PROCEDURE — 85610 PROTHROMBIN TIME: CPT

## 2022-08-23 PROCEDURE — 70450 CT HEAD/BRAIN W/O DYE: CPT

## 2022-08-23 PROCEDURE — 86900 BLOOD TYPING SEROLOGIC ABO: CPT

## 2022-08-23 PROCEDURE — 81001 URINALYSIS AUTO W/SCOPE: CPT

## 2022-08-23 PROCEDURE — 6370000000 HC RX 637 (ALT 250 FOR IP): Performed by: NURSE PRACTITIONER

## 2022-08-23 PROCEDURE — 72125 CT NECK SPINE W/O DYE: CPT

## 2022-08-23 PROCEDURE — 86901 BLOOD TYPING SEROLOGIC RH(D): CPT

## 2022-08-23 PROCEDURE — 36415 COLL VENOUS BLD VENIPUNCTURE: CPT

## 2022-08-23 PROCEDURE — 99285 EMERGENCY DEPT VISIT HI MDM: CPT

## 2022-08-23 PROCEDURE — 1200000000 HC SEMI PRIVATE

## 2022-08-23 PROCEDURE — 86850 RBC ANTIBODY SCREEN: CPT

## 2022-08-23 PROCEDURE — 90715 TDAP VACCINE 7 YRS/> IM: CPT | Performed by: PHYSICIAN ASSISTANT

## 2022-08-23 PROCEDURE — 80048 BASIC METABOLIC PNL TOTAL CA: CPT

## 2022-08-23 RX ORDER — LANOLIN ALCOHOL/MO/W.PET/CERES
6 CREAM (GRAM) TOPICAL NIGHTLY
Status: DISCONTINUED | OUTPATIENT
Start: 2022-08-23 | End: 2022-08-26 | Stop reason: HOSPADM

## 2022-08-23 RX ORDER — BUSPIRONE HYDROCHLORIDE 5 MG/1
5 TABLET ORAL 2 TIMES DAILY
Status: DISCONTINUED | OUTPATIENT
Start: 2022-08-23 | End: 2022-08-26 | Stop reason: HOSPADM

## 2022-08-23 RX ORDER — AMLODIPINE BESYLATE 10 MG/1
10 TABLET ORAL DAILY
Status: DISCONTINUED | OUTPATIENT
Start: 2022-08-24 | End: 2022-08-26 | Stop reason: HOSPADM

## 2022-08-23 RX ORDER — LIDOCAINE 4 G/G
1 PATCH TOPICAL EVERY 12 HOURS
Status: DISCONTINUED | OUTPATIENT
Start: 2022-08-23 | End: 2022-08-24

## 2022-08-23 RX ORDER — SODIUM CHLORIDE 9 MG/ML
INJECTION, SOLUTION INTRAVENOUS PRN
Status: DISCONTINUED | OUTPATIENT
Start: 2022-08-23 | End: 2022-08-24 | Stop reason: SDUPTHER

## 2022-08-23 RX ORDER — DONEPEZIL HYDROCHLORIDE 10 MG/1
10 TABLET, FILM COATED ORAL NIGHTLY
Status: DISCONTINUED | OUTPATIENT
Start: 2022-08-23 | End: 2022-08-26 | Stop reason: HOSPADM

## 2022-08-23 RX ORDER — VITAMIN B COMPLEX
1000 TABLET ORAL DAILY
Status: DISCONTINUED | OUTPATIENT
Start: 2022-08-24 | End: 2022-08-24

## 2022-08-23 RX ORDER — BUSPIRONE HYDROCHLORIDE 5 MG/1
5 TABLET ORAL 2 TIMES DAILY
COMMUNITY

## 2022-08-23 RX ORDER — DONEPEZIL HYDROCHLORIDE 10 MG/1
10 TABLET, FILM COATED ORAL NIGHTLY
COMMUNITY

## 2022-08-23 RX ORDER — SODIUM CHLORIDE 0.9 % (FLUSH) 0.9 %
5-40 SYRINGE (ML) INJECTION EVERY 12 HOURS SCHEDULED
Status: DISCONTINUED | OUTPATIENT
Start: 2022-08-23 | End: 2022-08-24

## 2022-08-23 RX ORDER — SODIUM CHLORIDE 0.9 % (FLUSH) 0.9 %
5-40 SYRINGE (ML) INJECTION PRN
Status: DISCONTINUED | OUTPATIENT
Start: 2022-08-23 | End: 2022-08-24 | Stop reason: SDUPTHER

## 2022-08-23 RX ORDER — POLYETHYLENE GLYCOL 3350 17 G/17G
17 POWDER, FOR SOLUTION ORAL DAILY PRN
Status: DISCONTINUED | OUTPATIENT
Start: 2022-08-23 | End: 2022-08-26 | Stop reason: HOSPADM

## 2022-08-23 RX ORDER — LANOLIN ALCOHOL/MO/W.PET/CERES
6 CREAM (GRAM) TOPICAL NIGHTLY
COMMUNITY

## 2022-08-23 RX ORDER — ONDANSETRON 4 MG/1
4 TABLET, ORALLY DISINTEGRATING ORAL EVERY 8 HOURS PRN
Status: DISCONTINUED | OUTPATIENT
Start: 2022-08-23 | End: 2022-08-26 | Stop reason: HOSPADM

## 2022-08-23 RX ORDER — OXYCODONE HYDROCHLORIDE 5 MG/1
5 TABLET ORAL EVERY 6 HOURS PRN
Status: DISCONTINUED | OUTPATIENT
Start: 2022-08-23 | End: 2022-08-24

## 2022-08-23 RX ORDER — ATORVASTATIN CALCIUM 40 MG/1
40 TABLET, FILM COATED ORAL DAILY
Status: DISCONTINUED | OUTPATIENT
Start: 2022-08-24 | End: 2022-08-26 | Stop reason: HOSPADM

## 2022-08-23 RX ORDER — ACETAMINOPHEN 325 MG/1
650 TABLET ORAL EVERY 4 HOURS PRN
Status: DISCONTINUED | OUTPATIENT
Start: 2022-08-23 | End: 2022-08-26 | Stop reason: HOSPADM

## 2022-08-23 RX ORDER — SODIUM CHLORIDE, SODIUM LACTATE, POTASSIUM CHLORIDE, CALCIUM CHLORIDE 600; 310; 30; 20 MG/100ML; MG/100ML; MG/100ML; MG/100ML
INJECTION, SOLUTION INTRAVENOUS CONTINUOUS
Status: DISCONTINUED | OUTPATIENT
Start: 2022-08-23 | End: 2022-08-24

## 2022-08-23 RX ORDER — LANOLIN ALCOHOL/MO/W.PET/CERES
1000 CREAM (GRAM) TOPICAL DAILY
Status: DISCONTINUED | OUTPATIENT
Start: 2022-08-24 | End: 2022-08-24

## 2022-08-23 RX ORDER — ONDANSETRON 2 MG/ML
4 INJECTION INTRAMUSCULAR; INTRAVENOUS EVERY 6 HOURS PRN
Status: DISCONTINUED | OUTPATIENT
Start: 2022-08-23 | End: 2022-08-26 | Stop reason: HOSPADM

## 2022-08-23 RX ORDER — FENTANYL CITRATE 50 UG/ML
25 INJECTION, SOLUTION INTRAMUSCULAR; INTRAVENOUS ONCE
Status: COMPLETED | OUTPATIENT
Start: 2022-08-23 | End: 2022-08-23

## 2022-08-23 RX ORDER — MORPHINE SULFATE 2 MG/ML
2 INJECTION, SOLUTION INTRAMUSCULAR; INTRAVENOUS EVERY 4 HOURS PRN
Status: DISCONTINUED | OUTPATIENT
Start: 2022-08-23 | End: 2022-08-24

## 2022-08-23 RX ORDER — ATORVASTATIN CALCIUM 40 MG/1
40 TABLET, FILM COATED ORAL DAILY
COMMUNITY

## 2022-08-23 RX ADMIN — TETANUS TOXOID, REDUCED DIPHTHERIA TOXOID AND ACELLULAR PERTUSSIS VACCINE, ADSORBED 0.5 ML: 5; 2.5; 8; 8; 2.5 SUSPENSION INTRAMUSCULAR at 23:45

## 2022-08-23 RX ADMIN — DONEPEZIL HYDROCHLORIDE 10 MG: 10 TABLET, FILM COATED ORAL at 23:32

## 2022-08-23 RX ADMIN — MORPHINE SULFATE 2 MG: 2 INJECTION, SOLUTION INTRAMUSCULAR; INTRAVENOUS at 23:32

## 2022-08-23 RX ADMIN — FENTANYL CITRATE 25 MCG: 50 INJECTION, SOLUTION INTRAMUSCULAR; INTRAVENOUS at 21:23

## 2022-08-23 RX ADMIN — BUSPIRONE HYDROCHLORIDE 5 MG: 5 TABLET ORAL at 23:32

## 2022-08-23 RX ADMIN — Medication 6 MG: at 23:32

## 2022-08-23 ASSESSMENT — PAIN DESCRIPTION - FREQUENCY: FREQUENCY: CONTINUOUS

## 2022-08-23 ASSESSMENT — PAIN DESCRIPTION - ORIENTATION
ORIENTATION: RIGHT
ORIENTATION: RIGHT

## 2022-08-23 ASSESSMENT — PAIN SCALES - GENERAL
PAINLEVEL_OUTOF10: 7
PAINLEVEL_OUTOF10: 10
PAINLEVEL_OUTOF10: 7

## 2022-08-23 ASSESSMENT — PAIN DESCRIPTION - LOCATION
LOCATION: HIP;LEG
LOCATION: HIP

## 2022-08-23 ASSESSMENT — PAIN DESCRIPTION - DESCRIPTORS
DESCRIPTORS: ACHING
DESCRIPTORS: ACHING

## 2022-08-23 ASSESSMENT — PAIN DESCRIPTION - PAIN TYPE: TYPE: ACUTE PAIN

## 2022-08-23 ASSESSMENT — PAIN DESCRIPTION - ONSET: ONSET: ON-GOING

## 2022-08-23 ASSESSMENT — PAIN - FUNCTIONAL ASSESSMENT
PAIN_FUNCTIONAL_ASSESSMENT: PREVENTS OR INTERFERES SOME ACTIVE ACTIVITIES AND ADLS
PAIN_FUNCTIONAL_ASSESSMENT: PREVENTS OR INTERFERES WITH MANY ACTIVE NOT PASSIVE ACTIVITIES

## 2022-08-23 ASSESSMENT — LIFESTYLE VARIABLES
HOW OFTEN DO YOU HAVE A DRINK CONTAINING ALCOHOL: MONTHLY OR LESS
HOW MANY STANDARD DRINKS CONTAINING ALCOHOL DO YOU HAVE ON A TYPICAL DAY: 1 OR 2

## 2022-08-23 NOTE — ED NOTES
Pt to ED via [facility] from [address] home s/p fall. Per report, pt fell landing on her right side. Pt c/o right hip and right shoulder pain. Per report, pt has a hx of dementia and is at baseline mental status. Pt does not take blood thinners. MILY Trivedi at bedside. Report given to receiving JOSELIN Pinto.         Florinda Mann RN  08/23/22 9510

## 2022-08-24 ENCOUNTER — ANESTHESIA (OUTPATIENT)
Dept: OPERATING ROOM | Age: 87
DRG: 481 | End: 2022-08-24
Payer: MEDICARE

## 2022-08-24 ENCOUNTER — ANESTHESIA EVENT (OUTPATIENT)
Dept: OPERATING ROOM | Age: 87
DRG: 481 | End: 2022-08-24
Payer: MEDICARE

## 2022-08-24 ENCOUNTER — APPOINTMENT (OUTPATIENT)
Dept: GENERAL RADIOLOGY | Age: 87
DRG: 481 | End: 2022-08-24
Payer: MEDICARE

## 2022-08-24 PROBLEM — S42.201A CLOSED FRACTURE OF PROXIMAL END OF RIGHT HUMERUS: Status: ACTIVE | Noted: 2022-08-24

## 2022-08-24 LAB
ANION GAP SERPL CALCULATED.3IONS-SCNC: 9 MMOL/L (ref 3–16)
BASOPHILS ABSOLUTE: 0 K/UL (ref 0–0.2)
BASOPHILS RELATIVE PERCENT: 0.3 %
BUN BLDV-MCNC: 19 MG/DL (ref 7–20)
CALCIUM SERPL-MCNC: 8.7 MG/DL (ref 8.3–10.6)
CHLORIDE BLD-SCNC: 101 MMOL/L (ref 99–110)
CO2: 26 MMOL/L (ref 21–32)
CREAT SERPL-MCNC: 0.9 MG/DL (ref 0.6–1.2)
EKG ATRIAL RATE: 96 BPM
EKG DIAGNOSIS: NORMAL
EKG P AXIS: 60 DEGREES
EKG P-R INTERVAL: 164 MS
EKG Q-T INTERVAL: 366 MS
EKG QRS DURATION: 88 MS
EKG QTC CALCULATION (BAZETT): 462 MS
EKG R AXIS: -24 DEGREES
EKG T AXIS: 70 DEGREES
EKG VENTRICULAR RATE: 96 BPM
EOSINOPHILS ABSOLUTE: 0.1 K/UL (ref 0–0.6)
EOSINOPHILS RELATIVE PERCENT: 0.6 %
GFR AFRICAN AMERICAN: >60
GFR NON-AFRICAN AMERICAN: 60
GLUCOSE BLD-MCNC: 124 MG/DL (ref 70–99)
HCT VFR BLD CALC: 32.6 % (ref 36–48)
HEMOGLOBIN: 10.9 G/DL (ref 12–16)
LYMPHOCYTES ABSOLUTE: 2 K/UL (ref 1–5.1)
LYMPHOCYTES RELATIVE PERCENT: 22.7 %
MCH RBC QN AUTO: 28.6 PG (ref 26–34)
MCHC RBC AUTO-ENTMCNC: 33.4 G/DL (ref 31–36)
MCV RBC AUTO: 85.7 FL (ref 80–100)
MONOCYTES ABSOLUTE: 0.9 K/UL (ref 0–1.3)
MONOCYTES RELATIVE PERCENT: 10.6 %
NEUTROPHILS ABSOLUTE: 5.8 K/UL (ref 1.7–7.7)
NEUTROPHILS RELATIVE PERCENT: 65.8 %
PDW BLD-RTO: 15.6 % (ref 12.4–15.4)
PLATELET # BLD: 235 K/UL (ref 135–450)
PMV BLD AUTO: 7.7 FL (ref 5–10.5)
POTASSIUM REFLEX MAGNESIUM: 3.7 MMOL/L (ref 3.5–5.1)
RBC # BLD: 3.8 M/UL (ref 4–5.2)
SODIUM BLD-SCNC: 136 MMOL/L (ref 136–145)
WBC # BLD: 8.9 K/UL (ref 4–11)

## 2022-08-24 PROCEDURE — 2709999900 HC NON-CHARGEABLE SUPPLY: Performed by: ORTHOPAEDIC SURGERY

## 2022-08-24 PROCEDURE — 36415 COLL VENOUS BLD VENIPUNCTURE: CPT

## 2022-08-24 PROCEDURE — 73080 X-RAY EXAM OF ELBOW: CPT

## 2022-08-24 PROCEDURE — C1713 ANCHOR/SCREW BN/BN,TIS/BN: HCPCS | Performed by: ORTHOPAEDIC SURGERY

## 2022-08-24 PROCEDURE — 1200000000 HC SEMI PRIVATE

## 2022-08-24 PROCEDURE — 93010 ELECTROCARDIOGRAM REPORT: CPT | Performed by: INTERNAL MEDICINE

## 2022-08-24 PROCEDURE — 3700000000 HC ANESTHESIA ATTENDED CARE: Performed by: ORTHOPAEDIC SURGERY

## 2022-08-24 PROCEDURE — 27245 TREAT THIGH FRACTURE: CPT | Performed by: ORTHOPAEDIC SURGERY

## 2022-08-24 PROCEDURE — 6360000002 HC RX W HCPCS: Performed by: ORTHOPAEDIC SURGERY

## 2022-08-24 PROCEDURE — 76000 FLUOROSCOPY <1 HR PHYS/QHP: CPT | Performed by: ORTHOPAEDIC SURGERY

## 2022-08-24 PROCEDURE — 2500000003 HC RX 250 WO HCPCS: Performed by: ORTHOPAEDIC SURGERY

## 2022-08-24 PROCEDURE — 3209999900 FLUORO FOR SURGICAL PROCEDURES

## 2022-08-24 PROCEDURE — A4216 STERILE WATER/SALINE, 10 ML: HCPCS | Performed by: NURSE ANESTHETIST, CERTIFIED REGISTERED

## 2022-08-24 PROCEDURE — 94760 N-INVAS EAR/PLS OXIMETRY 1: CPT

## 2022-08-24 PROCEDURE — 2580000003 HC RX 258: Performed by: ORTHOPAEDIC SURGERY

## 2022-08-24 PROCEDURE — 6360000002 HC RX W HCPCS: Performed by: NURSE ANESTHETIST, CERTIFIED REGISTERED

## 2022-08-24 PROCEDURE — 7100000000 HC PACU RECOVERY - FIRST 15 MIN: Performed by: ORTHOPAEDIC SURGERY

## 2022-08-24 PROCEDURE — 2580000003 HC RX 258: Performed by: NURSE ANESTHETIST, CERTIFIED REGISTERED

## 2022-08-24 PROCEDURE — 2580000003 HC RX 258: Performed by: NURSE PRACTITIONER

## 2022-08-24 PROCEDURE — 99231 SBSQ HOSP IP/OBS SF/LOW 25: CPT | Performed by: ORTHOPAEDIC SURGERY

## 2022-08-24 PROCEDURE — 3600000014 HC SURGERY LEVEL 4 ADDTL 15MIN: Performed by: ORTHOPAEDIC SURGERY

## 2022-08-24 PROCEDURE — 80048 BASIC METABOLIC PNL TOTAL CA: CPT

## 2022-08-24 PROCEDURE — 2500000003 HC RX 250 WO HCPCS: Performed by: NURSE ANESTHETIST, CERTIFIED REGISTERED

## 2022-08-24 PROCEDURE — 7100000001 HC PACU RECOVERY - ADDTL 15 MIN: Performed by: ORTHOPAEDIC SURGERY

## 2022-08-24 PROCEDURE — 3600000004 HC SURGERY LEVEL 4 BASE: Performed by: ORTHOPAEDIC SURGERY

## 2022-08-24 PROCEDURE — A4217 STERILE WATER/SALINE, 500 ML: HCPCS | Performed by: ORTHOPAEDIC SURGERY

## 2022-08-24 PROCEDURE — 6370000000 HC RX 637 (ALT 250 FOR IP): Performed by: NURSE PRACTITIONER

## 2022-08-24 PROCEDURE — C1769 GUIDE WIRE: HCPCS | Performed by: ORTHOPAEDIC SURGERY

## 2022-08-24 PROCEDURE — 85025 COMPLETE CBC W/AUTO DIFF WBC: CPT

## 2022-08-24 PROCEDURE — 73501 X-RAY EXAM HIP UNI 1 VIEW: CPT

## 2022-08-24 PROCEDURE — 0QS604Z REPOSITION RIGHT UPPER FEMUR WITH INTERNAL FIXATION DEVICE, OPEN APPROACH: ICD-10-PCS | Performed by: ORTHOPAEDIC SURGERY

## 2022-08-24 PROCEDURE — 6370000000 HC RX 637 (ALT 250 FOR IP): Performed by: ORTHOPAEDIC SURGERY

## 2022-08-24 PROCEDURE — 3700000001 HC ADD 15 MINUTES (ANESTHESIA): Performed by: ORTHOPAEDIC SURGERY

## 2022-08-24 PROCEDURE — APPNB45 APP NON BILLABLE 31-45 MINUTES: Performed by: NURSE PRACTITIONER

## 2022-08-24 PROCEDURE — 2720000010 HC SURG SUPPLY STERILE: Performed by: ORTHOPAEDIC SURGERY

## 2022-08-24 DEVICE — NAIL IM L235MM DIA10MM 130DEG SHT R PROX FEM GRN TI CANN: Type: IMPLANTABLE DEVICE | Site: HIP | Status: FUNCTIONAL

## 2022-08-24 DEVICE — IMPLANTABLE DEVICE: Type: IMPLANTABLE DEVICE | Site: HIP | Status: FUNCTIONAL

## 2022-08-24 DEVICE — SCREW BNE L34MM DIA5MM TIB LT GRN TI ST CANN LOK FULL THRD: Type: IMPLANTABLE DEVICE | Site: HIP | Status: FUNCTIONAL

## 2022-08-24 RX ORDER — ONDANSETRON 2 MG/ML
INJECTION INTRAMUSCULAR; INTRAVENOUS PRN
Status: DISCONTINUED | OUTPATIENT
Start: 2022-08-24 | End: 2022-08-24 | Stop reason: SDUPTHER

## 2022-08-24 RX ORDER — LIDOCAINE HYDROCHLORIDE 20 MG/ML
INJECTION, SOLUTION EPIDURAL; INFILTRATION; INTRACAUDAL; PERINEURAL PRN
Status: DISCONTINUED | OUTPATIENT
Start: 2022-08-24 | End: 2022-08-24 | Stop reason: SDUPTHER

## 2022-08-24 RX ORDER — SODIUM CHLORIDE 0.9 % (FLUSH) 0.9 %
5-40 SYRINGE (ML) INJECTION EVERY 12 HOURS SCHEDULED
Status: DISCONTINUED | OUTPATIENT
Start: 2022-08-24 | End: 2022-08-24 | Stop reason: HOSPADM

## 2022-08-24 RX ORDER — TRAMADOL HYDROCHLORIDE 50 MG/1
50 TABLET ORAL EVERY 6 HOURS PRN
Status: DISCONTINUED | OUTPATIENT
Start: 2022-08-24 | End: 2022-08-26 | Stop reason: HOSPADM

## 2022-08-24 RX ORDER — CEFAZOLIN SODIUM 1 G/3ML
INJECTION, POWDER, FOR SOLUTION INTRAMUSCULAR; INTRAVENOUS PRN
Status: DISCONTINUED | OUTPATIENT
Start: 2022-08-24 | End: 2022-08-24 | Stop reason: SDUPTHER

## 2022-08-24 RX ORDER — HYDROXYZINE HYDROCHLORIDE 10 MG/1
10 TABLET, FILM COATED ORAL EVERY 8 HOURS PRN
Status: DISCONTINUED | OUTPATIENT
Start: 2022-08-24 | End: 2022-08-26 | Stop reason: HOSPADM

## 2022-08-24 RX ORDER — SODIUM CHLORIDE 9 MG/ML
INJECTION INTRAVENOUS PRN
Status: DISCONTINUED | OUTPATIENT
Start: 2022-08-24 | End: 2022-08-24 | Stop reason: SDUPTHER

## 2022-08-24 RX ORDER — SODIUM CHLORIDE 9 MG/ML
INJECTION, SOLUTION INTRAVENOUS PRN
Status: DISCONTINUED | OUTPATIENT
Start: 2022-08-24 | End: 2022-08-24 | Stop reason: HOSPADM

## 2022-08-24 RX ORDER — GLYCOPYRROLATE 0.2 MG/ML
INJECTION INTRAMUSCULAR; INTRAVENOUS PRN
Status: DISCONTINUED | OUTPATIENT
Start: 2022-08-24 | End: 2022-08-24 | Stop reason: SDUPTHER

## 2022-08-24 RX ORDER — MAGNESIUM HYDROXIDE 1200 MG/15ML
LIQUID ORAL CONTINUOUS PRN
Status: DISCONTINUED | OUTPATIENT
Start: 2022-08-24 | End: 2022-08-24 | Stop reason: HOSPADM

## 2022-08-24 RX ORDER — ASPIRIN 81 MG/1
81 TABLET ORAL 2 TIMES DAILY
Status: DISCONTINUED | OUTPATIENT
Start: 2022-08-25 | End: 2022-08-26 | Stop reason: HOSPADM

## 2022-08-24 RX ORDER — SODIUM CHLORIDE 9 MG/ML
INJECTION, SOLUTION INTRAVENOUS CONTINUOUS
Status: DISCONTINUED | OUTPATIENT
Start: 2022-08-24 | End: 2022-08-26 | Stop reason: HOSPADM

## 2022-08-24 RX ORDER — SODIUM CHLORIDE 0.9 % (FLUSH) 0.9 %
5-40 SYRINGE (ML) INJECTION PRN
Status: DISCONTINUED | OUTPATIENT
Start: 2022-08-24 | End: 2022-08-24 | Stop reason: HOSPADM

## 2022-08-24 RX ORDER — ONDANSETRON 2 MG/ML
4 INJECTION INTRAMUSCULAR; INTRAVENOUS
Status: DISCONTINUED | OUTPATIENT
Start: 2022-08-24 | End: 2022-08-24 | Stop reason: HOSPADM

## 2022-08-24 RX ORDER — VECURONIUM BROMIDE 1 MG/ML
INJECTION, POWDER, LYOPHILIZED, FOR SOLUTION INTRAVENOUS PRN
Status: DISCONTINUED | OUTPATIENT
Start: 2022-08-24 | End: 2022-08-24 | Stop reason: SDUPTHER

## 2022-08-24 RX ORDER — TRAMADOL HYDROCHLORIDE 50 MG/1
100 TABLET ORAL EVERY 6 HOURS PRN
Status: DISCONTINUED | OUTPATIENT
Start: 2022-08-24 | End: 2022-08-26 | Stop reason: HOSPADM

## 2022-08-24 RX ORDER — SUCCINYLCHOLINE/SOD CL,ISO/PF 200MG/10ML
SYRINGE (ML) INTRAVENOUS PRN
Status: DISCONTINUED | OUTPATIENT
Start: 2022-08-24 | End: 2022-08-24 | Stop reason: SDUPTHER

## 2022-08-24 RX ORDER — BUPIVACAINE HYDROCHLORIDE 5 MG/ML
INJECTION, SOLUTION EPIDURAL; INTRACAUDAL
Status: COMPLETED | OUTPATIENT
Start: 2022-08-24 | End: 2022-08-24

## 2022-08-24 RX ORDER — SODIUM CHLORIDE 9 MG/ML
INJECTION, SOLUTION INTRAVENOUS PRN
Status: DISCONTINUED | OUTPATIENT
Start: 2022-08-24 | End: 2022-08-26 | Stop reason: HOSPADM

## 2022-08-24 RX ORDER — OXYCODONE HYDROCHLORIDE 5 MG/1
5 TABLET ORAL
Status: DISCONTINUED | OUTPATIENT
Start: 2022-08-24 | End: 2022-08-24 | Stop reason: HOSPADM

## 2022-08-24 RX ORDER — SODIUM CHLORIDE 9 MG/ML
INJECTION, SOLUTION INTRAVENOUS CONTINUOUS PRN
Status: DISCONTINUED | OUTPATIENT
Start: 2022-08-24 | End: 2022-08-24 | Stop reason: SDUPTHER

## 2022-08-24 RX ORDER — ACETAMINOPHEN 500 MG
1000 TABLET ORAL ONCE
Status: DISCONTINUED | OUTPATIENT
Start: 2022-08-24 | End: 2022-08-24 | Stop reason: HOSPADM

## 2022-08-24 RX ORDER — DROPERIDOL 2.5 MG/ML
0.62 INJECTION, SOLUTION INTRAMUSCULAR; INTRAVENOUS
Status: DISCONTINUED | OUTPATIENT
Start: 2022-08-24 | End: 2022-08-24 | Stop reason: HOSPADM

## 2022-08-24 RX ORDER — FENTANYL CITRATE 50 UG/ML
25 INJECTION, SOLUTION INTRAMUSCULAR; INTRAVENOUS EVERY 5 MIN PRN
Status: DISCONTINUED | OUTPATIENT
Start: 2022-08-24 | End: 2022-08-24 | Stop reason: HOSPADM

## 2022-08-24 RX ORDER — PROPOFOL 10 MG/ML
INJECTION, EMULSION INTRAVENOUS PRN
Status: DISCONTINUED | OUTPATIENT
Start: 2022-08-24 | End: 2022-08-24 | Stop reason: SDUPTHER

## 2022-08-24 RX ORDER — SODIUM CHLORIDE 0.9 % (FLUSH) 0.9 %
5-40 SYRINGE (ML) INJECTION PRN
Status: DISCONTINUED | OUTPATIENT
Start: 2022-08-24 | End: 2022-08-26 | Stop reason: HOSPADM

## 2022-08-24 RX ORDER — DEXAMETHASONE SODIUM PHOSPHATE 4 MG/ML
INJECTION, SOLUTION INTRA-ARTICULAR; INTRALESIONAL; INTRAMUSCULAR; INTRAVENOUS; SOFT TISSUE PRN
Status: DISCONTINUED | OUTPATIENT
Start: 2022-08-24 | End: 2022-08-24 | Stop reason: SDUPTHER

## 2022-08-24 RX ORDER — EPHEDRINE SULFATE/0.9% NACL/PF 50 MG/5 ML
SYRINGE (ML) INTRAVENOUS PRN
Status: DISCONTINUED | OUTPATIENT
Start: 2022-08-24 | End: 2022-08-24 | Stop reason: SDUPTHER

## 2022-08-24 RX ORDER — FENTANYL CITRATE 50 UG/ML
INJECTION, SOLUTION INTRAMUSCULAR; INTRAVENOUS PRN
Status: DISCONTINUED | OUTPATIENT
Start: 2022-08-24 | End: 2022-08-24 | Stop reason: SDUPTHER

## 2022-08-24 RX ORDER — SODIUM CHLORIDE 0.9 % (FLUSH) 0.9 %
5-40 SYRINGE (ML) INJECTION EVERY 12 HOURS SCHEDULED
Status: DISCONTINUED | OUTPATIENT
Start: 2022-08-24 | End: 2022-08-26 | Stop reason: HOSPADM

## 2022-08-24 RX ADMIN — Medication 10 MG: at 14:32

## 2022-08-24 RX ADMIN — BUSPIRONE HYDROCHLORIDE 5 MG: 5 TABLET ORAL at 10:45

## 2022-08-24 RX ADMIN — Medication 10 MG: at 14:22

## 2022-08-24 RX ADMIN — PROPOFOL 70 MG: 10 INJECTION, EMULSION INTRAVENOUS at 14:19

## 2022-08-24 RX ADMIN — SUGAMMADEX 200 MG: 100 INJECTION, SOLUTION INTRAVENOUS at 15:02

## 2022-08-24 RX ADMIN — CEFAZOLIN SODIUM 2000 MG: 1 INJECTION, POWDER, FOR SOLUTION INTRAMUSCULAR; INTRAVENOUS at 14:15

## 2022-08-24 RX ADMIN — Medication 10 ML: at 00:08

## 2022-08-24 RX ADMIN — SODIUM CHLORIDE 4 ML: 9 INJECTION INTRAMUSCULAR; INTRAVENOUS; SUBCUTANEOUS at 14:22

## 2022-08-24 RX ADMIN — FENTANYL CITRATE 50 MCG: 50 INJECTION INTRAMUSCULAR; INTRAVENOUS at 14:16

## 2022-08-24 RX ADMIN — BUSPIRONE HYDROCHLORIDE 5 MG: 5 TABLET ORAL at 23:49

## 2022-08-24 RX ADMIN — Medication 6 MG: at 23:49

## 2022-08-24 RX ADMIN — SODIUM CHLORIDE: 9 INJECTION, SOLUTION INTRAVENOUS at 14:15

## 2022-08-24 RX ADMIN — SODIUM CHLORIDE, POTASSIUM CHLORIDE, SODIUM LACTATE AND CALCIUM CHLORIDE: 600; 310; 30; 20 INJECTION, SOLUTION INTRAVENOUS at 00:08

## 2022-08-24 RX ADMIN — DEXAMETHASONE SODIUM PHOSPHATE 8 MG: 4 INJECTION, SOLUTION INTRAMUSCULAR; INTRAVENOUS at 14:24

## 2022-08-24 RX ADMIN — SODIUM CHLORIDE: 9 INJECTION, SOLUTION INTRAVENOUS at 17:01

## 2022-08-24 RX ADMIN — CEFAZOLIN 2000 MG: 2 INJECTION, POWDER, FOR SOLUTION INTRAMUSCULAR; INTRAVENOUS at 23:48

## 2022-08-24 RX ADMIN — OXYCODONE 5 MG: 5 TABLET ORAL at 01:21

## 2022-08-24 RX ADMIN — FENTANYL CITRATE 50 MCG: 50 INJECTION INTRAMUSCULAR; INTRAVENOUS at 14:15

## 2022-08-24 RX ADMIN — CYANOCOBALAMIN TAB 1000 MCG 1000 MCG: 1000 TAB at 10:45

## 2022-08-24 RX ADMIN — GLYCOPYRROLATE 0.2 MG: 0.2 INJECTION, SOLUTION INTRAMUSCULAR; INTRAVENOUS at 14:24

## 2022-08-24 RX ADMIN — OXYCODONE 5 MG: 5 TABLET ORAL at 10:45

## 2022-08-24 RX ADMIN — Medication 1000 UNITS: at 10:45

## 2022-08-24 RX ADMIN — Medication 20 MG: at 14:24

## 2022-08-24 RX ADMIN — DONEPEZIL HYDROCHLORIDE 10 MG: 10 TABLET, FILM COATED ORAL at 23:49

## 2022-08-24 RX ADMIN — LIDOCAINE HYDROCHLORIDE 60 MG: 20 INJECTION, SOLUTION EPIDURAL; INFILTRATION; INTRACAUDAL; PERINEURAL at 14:19

## 2022-08-24 RX ADMIN — Medication 10 MG: at 14:28

## 2022-08-24 RX ADMIN — ATORVASTATIN CALCIUM 40 MG: 40 TABLET, FILM COATED ORAL at 10:45

## 2022-08-24 RX ADMIN — Medication 10 ML: at 23:47

## 2022-08-24 RX ADMIN — Medication 120 MG: at 14:19

## 2022-08-24 RX ADMIN — AMLODIPINE BESYLATE 10 MG: 10 TABLET ORAL at 10:45

## 2022-08-24 RX ADMIN — VECURONIUM BROMIDE 4 MG: 1 INJECTION, POWDER, LYOPHILIZED, FOR SOLUTION INTRAVENOUS at 14:22

## 2022-08-24 RX ADMIN — Medication 10 ML: at 10:45

## 2022-08-24 RX ADMIN — ONDANSETRON 4 MG: 2 INJECTION INTRAMUSCULAR; INTRAVENOUS at 14:24

## 2022-08-24 ASSESSMENT — PAIN DESCRIPTION - DESCRIPTORS
DESCRIPTORS: ACHING
DESCRIPTORS: SHARP;SPASM
DESCRIPTORS: SPASM;SHARP

## 2022-08-24 ASSESSMENT — PAIN - FUNCTIONAL ASSESSMENT
PAIN_FUNCTIONAL_ASSESSMENT: PREVENTS OR INTERFERES SOME ACTIVE ACTIVITIES AND ADLS
PAIN_FUNCTIONAL_ASSESSMENT: NONE - DENIES PAIN
PAIN_FUNCTIONAL_ASSESSMENT: PREVENTS OR INTERFERES SOME ACTIVE ACTIVITIES AND ADLS
PAIN_FUNCTIONAL_ASSESSMENT: PREVENTS OR INTERFERES SOME ACTIVE ACTIVITIES AND ADLS

## 2022-08-24 ASSESSMENT — PAIN SCALES - GENERAL
PAINLEVEL_OUTOF10: 9
PAINLEVEL_OUTOF10: 8
PAINLEVEL_OUTOF10: 7
PAINLEVEL_OUTOF10: 0
PAINLEVEL_OUTOF10: 0
PAINLEVEL_OUTOF10: 2
PAINLEVEL_OUTOF10: 5

## 2022-08-24 ASSESSMENT — PAIN DESCRIPTION - LOCATION
LOCATION: LEG
LOCATION: LEG
LOCATION: HIP
LOCATION: LEG
LOCATION: HIP

## 2022-08-24 ASSESSMENT — PAIN DESCRIPTION - ONSET
ONSET: ON-GOING

## 2022-08-24 ASSESSMENT — PAIN SCALES - PAIN ASSESSMENT IN ADVANCED DEMENTIA (PAINAD)
CONSOLABILITY: 0
BODYLANGUAGE: 0
NEGVOCALIZATION: 0
BREATHING: 0
FACIALEXPRESSION: 0
TOTALSCORE: 0

## 2022-08-24 ASSESSMENT — ENCOUNTER SYMPTOMS
COLOR CHANGE: 0
VOMITING: 0
ABDOMINAL PAIN: 0
SHORTNESS OF BREATH: 0
SHORTNESS OF BREATH: 0

## 2022-08-24 ASSESSMENT — PAIN DESCRIPTION - PAIN TYPE
TYPE: ACUTE PAIN

## 2022-08-24 ASSESSMENT — PAIN DESCRIPTION - ORIENTATION
ORIENTATION: RIGHT

## 2022-08-24 ASSESSMENT — PAIN DESCRIPTION - FREQUENCY
FREQUENCY: CONTINUOUS

## 2022-08-24 NOTE — OP NOTE
Operative Note      Patient: Corrina Tubbs  YOB: 1935  MRN: [id number]    Date of Procedure: 8/24/2022    Pre-Op Diagnosis: RIGHT HIP FRACTURE    Post-Op Diagnosis: Comminuted right intertrochanteric hip fracture. Procedure(s):  RIGHT HIP INTERTROCHAN TFN    Surgeon(s):  Carley Felipe MD    Assistant:   First Assistant: Jena Rain    Anesthesia: General    Estimated Blood Loss (mL): 591     Complications: None    Specimens:   * No specimens in log *    Implants:  * No implants in log *      Drains:   Urinary Catheter 08/23/22 Walden (Active)   $ Urethral catheter insertion $ Not inserted for procedure 08/23/22 2056   Catheter Indications Perioperative use for selected surgical procedures 08/24/22 1127   Site Assessment Pink; No urethral drainage 08/24/22 1127   Urine Color Yellow 08/24/22 1127   Urine Appearance Clear 08/24/22 1127   Collection Container Standard 08/24/22 1127   Securement Method Securing device (Describe) 08/24/22 1127   Catheter Care Completed Yes 08/24/22 0546   Catheter Best Practices  Drainage tube clipped to bed 08/24/22 1127   Status Draining 08/24/22 1127   Output (mL) 800 mL 08/24/22 0546       Findings: Moderate to severe comminution of the right hip noted. Detailed Description of Procedure:     PATIENT NAME:                     Corrina Tubbs  YOB: 1935   MEDICAL RECORD#         [id number]  SURGERY DATE:         8/24/2022  SURGEON:                 Carley Felipe MD    PREOPERATIVE DIAGNOSIS: Right intertrochanteric hip fracture. POSTOPERATIVE DIAGNOSIS: Right intertrochanteric hip fracture. PROCEDURE: Right hip femoral nailing. #2 intraoperative C arm fluoroscopic evaluation and interpretation     ANESTHESIA: General anesthesia. INTRAVENOUS FLUIDS: Crystalloid. ESTIMATED BLOOD LOSS: 795UH      COMPLICATIONS: None. The patient tolerated the quite procedure well. INDICATIONS: The patient is a 82 y. o. female History of injury: fall. The injury occurred at home. The patient was unable to ambulate. Orthopedics was consulted. The patient was found to have an intertrochanteric hip fracture. Due to   the nature of her injury, the patient was ultimately cleared and scheduled for trochanteric femoral nailing of the hip. REPORT: The patient was identified preoperatively. The proper extremity was then marked. The patient was then taken to the operating room and general anesthesia was administered by Anesthesia. The unaffected lower extremity was positioned in the well-leg shelton. The operative extremity was positioned in the traction boot. We then applied traction and reduced the fracture rather well. Biplanar C-arm confirmed acceptable reduction. We then went ahead and ChloraPrepped the operative hip, pelvis and lower extremity in a standard fashion. Appropriate preoperative antibiotics were administered per SCIP. We then were ready for incision. Using a standard incision just proximal to the greater trochanter, we incised skin and coagulated all bleeders with Bovie electrocautery. We dissected down and split the fascia ganga longitudinally. We did use the large C arm/biplanar fluoroscopy throughout the entire procedure. We then went ahead and seated our Steinmann pin on the tip of the greater trochanter. We were satisfied with the position on the AP and lateral views. We went ahead and seated our pin and passed this into the medullary canal of the femur. Again, we were satisfied with the positioning on the AP and   lateral views. We then used our starter drill/ reamer to open up the   medullary canal. We used a intermediate length 10 mm 130 degree Synthes TFN nail. The femoral canal was reamed and prepared in standard fashion. The nail was seated without difficulty. We were then   ready for insertion of the helical blade.  We passed our Steinmann pin in a center-center position into the head on the AP and lateral views. We then   went ahead and depth gauged. We then reamed the outer cortex. We then drilled for the helical blade up into the head. The helical blade was then   inserted and was of appropriate length. We then rotationally locked the   Helical blade. Using the distal aiming arm, an interlocking screw was inserted distally. This was drilled, depth gauged and inserted in   the appropriate fashion. We then irrigated all layers rather copiously. Again,   using the large C-arm, all hardware was in good position. The fracture was   well reduced. We were then ready for closure. We then closed the fascia   with interrupted figure-of-eight, No. 1 Vicryl stitches. We then closed the   subcutaneous tissues with interrupted 2-0 simple Vicryl stitches. We then   closed the skin with staples. Sterile dressings were applied. There were no   complications.       Electronically signed by Jasmin Maurer MD on 8/24/2022 at 1:54 PM

## 2022-08-24 NOTE — PROGRESS NOTES
[facility] Patient returned to room. VSS. Started patient on new bag of fluids. Ordered dinner for patient. Patient denies pain at this time. Dressing clean, dry and intact. Will continue to round on patient per unit protocol. Call light and bedside table within reach. Will continue to monitor and reassess.   Electronically signed by Aleksandar Jaeger RN on 8/24/2022 at 5:36 PM

## 2022-08-24 NOTE — PROGRESS NOTES
Patient medicated with oxycodone 5 mg for rt hip pain. Ice pack applied to rt hip. Will continue to monitor pt pain.

## 2022-08-24 NOTE — ANESTHESIA POSTPROCEDURE EVALUATION
Department of Anesthesiology  Postprocedure Note    Patient:  Brooke Tariq  MRN: [id number]  YOB: 1935  Date of evaluation: 8/24/2022      Procedure Summary     Date: 08/24/22 Room / Location: [address]    Anesthesia Start: 3740 Anesthesia Stop: 8425    Procedure: RIGHT HIP INTERTROCHAN TFN (Right: Hip) Diagnosis:       [facility] right [facility]      (RIGHT HIP FRACTURE)    Surgeons: Alyx Hernandez MD Responsible Provider: Audley Lombard, MD    Anesthesia Type: General ASA Status: 3          Anesthesia Type: General    Sandee Phase I: Sandee Score: 8    Sandee Phase II:        Anesthesia Post Evaluation    Patient location during evaluation: PACU  Level of consciousness: awake and alert  Airway patency: patent  Nausea & Vomiting: no nausea and no vomiting  Complications: no  Cardiovascular status: blood pressure returned to baseline  Respiratory status: acceptable  Hydration status: euvolemic  Comments: Postoperative Anesthesia Note    Name:    Brooke Tariq  MRN:      [id number]    Patient Vitals in the past 12 hrs:  08/24/22 1615, BP:(!) 121/56, Pulse:75, Resp:13, SpO2:90 %  08/24/22 1545, BP:(!) 121/55, Pulse:80, Resp:28, SpO2:92 %  08/24/22 1540, BP:(!) 113/53, Pulse:94, Resp:23, SpO2:90 %  08/24/22 1537, BP:(!) 111/56, Temp:98.3 °F (36.8 °C), Temp src:Temporal, Pulse:86, Resp:12, SpO2:90 %  08/24/22 1349, BP:(!) 133/44, Temp:98.4 °F (36.9 °C), Pulse:67, Resp:14, SpO2:(!) 89 %  08/24/22 1132, BP:133/68, Temp:98.4 °F (36.9 °C), Temp src:Oral, Pulse:76, Resp:18, SpO2:91 %  08/24/22 1003, Pulse:91, Resp:16, SpO2:95 %  08/24/22 0735, BP:102/63, Temp:98.7 °F (37.1 °C), Temp src:Oral, Pulse:96, Resp:18, SpO2:92 %  08/24/22 0546, Weight:141 lb 5 oz (64.1 kg)     LABS:    CBC  Lab Results       Component                Value               Date/Time                  WBC                      8.9                 08/24/2022 06:17 AM HGB                      10.9 (L)            08/24/2022 06:17 AM        HCT                      32.6 (L)            08/24/2022 06:17 AM        PLT                      235                 08/24/2022 06:17 AM   RENAL  Lab Results       Component                Value               Date/Time                  NA                       136                 08/24/2022 06:17 AM        K                        3.7                 08/24/2022 06:17 AM        CL                       101                 08/24/2022 06:17 AM        CO2                      26                  08/24/2022 06:17 AM        BUN                      19                  08/24/2022 06:17 AM        CREATININE               0.9                 08/24/2022 06:17 AM        GLUCOSE                  124 (H)             08/24/2022 06:17 AM   COAGS  Lab Results       Component                Value               Date/Time                  PROTIME                  13.4                08/23/2022 08:26 PM        INR                      1.03                08/23/2022 08:26 PM        APTT                     26.9                12/11/2012 09:37 AM     Intake & Output:  @65NLWN@    Nausea & Vomiting:  No    Level of Consciousness:  Awake    Pain Assessment:  Adequate analgesia    Anesthesia Complications:  No apparent anesthetic complications    SUMMARY      Vital signs stable  OK to discharge from Stage I post anesthesia care.   Care transferred from Anesthesiology department on discharge from perioperative area

## 2022-08-24 NOTE — DISCHARGE INSTR - COC
Continuity of Care Form    Patient Name: Delores Castanon   :  1935  MRN:  [id number]    Admit date:  2022  Discharge date:  ***    Code Status Order: DNR-CC   Advance Directives:     Admitting Physician:  Earl Gómez DO  PCP: Romina Dave    Discharging Nurse: [facility]/Room#: O5X-0566/3120-01  Discharging Unit Phone Number: ***    Emergency Contact:   Extended Emergency Contact Information  Primary Emergency Contact: [address] Phone: [phone]  Mobile Phone: [phone]  Relation: Other  Secondary Emergency Contact: Wilfredo Lozano, [address] Phone: [phone]  Mobile Phone: [phone]  Relation: Child    Past Surgical History:  Past Surgical History:   Procedure Laterality Date    HYSTERECTOMY (CERVIX STATUS UNKNOWN)  Cale Monge      Dr. David Senior  Dec 2012    Dr Alamo Marker :  Right    VARICOSE VEIN SURGERY         Immunization History:   Immunization History   Administered Date(s) Administered    COVID-19, PFIZER PURPLE top, DILUTE for use, (age 15 y+), 30mcg/0.3mL 10/04/2021, 10/24/2021, 2022    Influenza Vaccine, unspecified formulation 10/03/2016    Influenza Virus Vaccine 10/16/2013, 10/09/2014    Influenza Whole 10/19/2015    Influenza, FLUBLOK, (age 25 y+), PF 2018    Pneumococcal Conjugate 13-valent (Drovbbu96) 2016, 2016    Pneumococcal Conjugate 7-valent (Prevnar7) 2007    Pneumococcal Polysaccharide (Tdxubmxer14) 2017    Tdap (Boostrix, Adacel) 2011, 2022    Zoster Live (Zostavax) 2012       Active Problems:  Patient Active Problem List   Diagnosis Code    Pure hypercholesterolemia E78.00    Knee osteoarthritis M17.10    Tear of meniscus of knee S83.209A    Essential hypertension, benign I10    Total knee replacement status Z96.659    CKD (chronic kidney disease) stage 3, GFR 30-59 ml/min ([address] N18.30    Allergic rhinitis due to pollen J30.1    Impingement syndrome of right shoulder M75.41    Hyperuricemia S00.7    Acute metabolic encephalopathy N57.19    Pneumonia J18.9    Closed right hip fracture, initial encounter ([facility] [address] S72.001A    Closed fracture of proximal end of right humerus S42.201A       Isolation/Infection:   Isolation            No Isolation          Patient Infection Status       Infection Onset Added Last Indicated Last Indicated By Review Planned Expiration Resolved Resolved By    None active    Resolved    C-diff Rule Out 10/11/21 10/11/21 10/11/21 Gastrointestinal Panel, Molecular (Ordered)   22 Diogenes Degroot RN    COVID-19 (Rule Out) 10/11/21 10/11/21 10/12/21 COVID-19 (Ordered)   10/12/21 Rule-Out Test Resulted    COVID-19 (Rule Out) 10/11/21 10/11/21 10/11/21 COVID-19, Rapid (Ordered)   10/11/21 Rule-Out Test Resulted            Nurse Assessment:  Last Vital Signs: /63   Pulse 96   Temp 98.7 °F (37.1 °C) (Oral)   Resp 18   Ht 5' 5\" (1.651 m)   Wt 141 lb 5 oz (64.1 kg)   SpO2 92%   BMI 23.52 kg/m²     Last documented pain score (0-10 scale): Pain Level: 7  Last Weight:   Wt Readings from Last 1 Encounters:   22 141 lb 5 oz (64.1 kg)     Mental Status:  {IP PT MENTAL STATUS:52909}    IV Access:  { WILY IV ACCESS:746258194}    Nursing Mobility/ADLs:  Walking   {[facility] DME QFCH:272697037}  Transfer  {[facility] DME UKXL:082925905}  Bathing  {[facility] DME EDN}  Dressing  {[facility] DME KLRV:956506131}  Toileting  {[facility] DME TCQH:624757892}  Feeding  {[facility] DME SWJM:189447951}  Med Admin  {[facility] DME VCJY:384519700}  Med Delivery   { WILY MED Delivery:011297749}    Wound Care Documentation and Therapy:        Elimination:  Continence:    Bowel: {YES / NICKO:42543}  Bladder: {YES / QA:98050}  Urinary Catheter: {Urinary Catheter:637757614}   Colostomy/Ileostomy/Ileal Conduit: {YES / TJ:69827}       Date of Last BM: ***    Intake/Output Summary (Last 24 hours) at 2022 0950  Last data filed at 8/24/2022 0737  Gross per 24 hour   Intake 779.96 ml   Output 800 ml   Net -20.04 ml     I/O last 3 completed shifts: In: 240 [P.O.:240]  Out: 800 [Urine:800]    Safety Concerns:     [address] [employer] Safety Concerns:291518960}    Impairments/Disabilities:      [employer] Impairments/Disabilities:177848549}    Nutrition Therapy:  Current Nutrition Therapy:   [employer] Diet List:161279524}    Routes of Feeding: {CHP DME Other Feedings:371956662}  Liquids: {Slp liquid thickness:27946}  Daily Fluid Restriction: {CHP DME Yes amt example:427238040}  Last Modified Barium Swallow with Video (Video Swallowing Test): {Done Not Done GEPR:219806539}    Treatments at the Time of Hospital Discharge:   Respiratory Treatments: ***  Oxygen Therapy:  {Therapy; copd oxygen:11191}  Ventilator:    {MH CC Vent FCPC:557623014}    Rehab Therapies: Physical Therapy  Weight Bearing Status/Restrictions: No weight bearing restrictions  Other Medical Equipment (for information only, NOT a DME order):  {EQUIPMENT:445070685}  Other Treatments: Mepilex dressing x 7 days; then remove if no drainage. Remove staples POD #10-14 and replace with steri strips. Follow up with Dr. Zuhair Powers 4 weeks post op. Call [phone] for appt. Anticoagulation:   mg daily x 30 days. You have demonstrated risk factors for osteoporosis, such as age greater than [de-identified] years and evidence of a fracture. Please see your primary care physician for evaluation for osteoporosis, including consideration for DEXA scanning, if this is felt to be clinically indicated.       Patient's personal belongings (please select all that are sent with patient):  {[facility] DME Belongings:635990399}    RN SIGNATURE:  {Esignature:489212398}    CASE MANAGEMENT/SOCIAL WORK SECTION    Inpatient Status Date: ***    Readmission Risk Assessment Score:  Readmission Risk              Risk of Unplanned Readmission:  10         Discharging to Facility/ Agency   Name: [address] [place] [address] Center  Address:  [address]\""[place], [address] [place] [address]   Phone:  [phone]  Fax:  [phone]    / signature: Electronically signed by Laz Penny on 8/24/22 at 9:50 AM EDT    PHYSICIAN SECTION    Prognosis: Fair    Condition at Discharge: Stable    Rehab Potential (if transferring to Rehab): Fair    Recommended Labs or Other Treatments After Discharge: ptot    Physician Certification: I certify the above information and transfer of Candelario Siddiqui  is necessary for the continuing treatment of the diagnosis listed and that she requires [facility] for less 30 days.      Update Admission H&P: No change in H&P    PHYSICIAN SIGNATURE:  Electronically signed by Patty Das MD on 8/26/22 at 2:40 PM EDT

## 2022-08-24 NOTE — ANESTHESIA PRE PROCEDURE
CNP        sodium chloride flush 0.9 % injection 5-40 mL  5-40 mL IntraVENous 2 times per day Yaquelin S Puthoff, APRN - CNP   10 mL at 08/24/22 0008    sodium chloride flush 0.9 % injection 5-40 mL  5-40 mL IntraVENous PRN Yaquelin S Puthoff, APRN - CNP        0.9 % sodium chloride infusion   IntraVENous PRN Yaquelin S Puthoff, APRN - CNP        ondansetron (ZOFRAN-ODT) disintegrating tablet 4 mg  4 mg Oral Q8H PRN Yaquelin S Puthoff, APRN - CNP        Or    ondansetron (ZOFRAN) injection 4 mg  4 mg IntraVENous Q6H PRN Yaquelin S Puthoff, APRN - CNP        polyethylene glycol (GLYCOLAX) packet 17 g  17 g Oral Daily PRN Yaquelin S Puthoff, APRN - CNP        acetaminophen (TYLENOL) tablet 650 mg  650 mg Oral Q4H PRN Yaquelin S Puthoff, APRN - CNP        lidocaine 4 % external patch 1 patch  1 patch Topical Q12H Yaquelin S Puthoff, APRN - CNP   1 patch at 08/23/22 2336    oxyCODONE (ROXICODONE) immediate release tablet 5 mg  5 mg Oral Q6H PRN Suhail Carpio, APRN - CNP   5 mg at 08/24/22 0121    morphine (PF) injection 2 mg  2 mg IntraVENous Q4H PRN Suhail Carpio, APRN - CNP   2 mg at 08/23/22 2332    lactated ringers infusion   IntraVENous Continuous Yaquelin ZAMUDIO Putaartiff, APRN - CNP 75 mL/hr at 08/24/22 0737 Rate Verify at 08/24/22 0737       Allergies:     Allergies   Allergen Reactions    Morphine      Vomiting      Vicodin [Hydrocodone-Acetaminophen]      Vomiting/nausea    Meclizine      Makes her feels worse     Zetia [Ezetimibe] Itching    Lipitor [Atorvastatin] Other (See Comments)     myalgia       Problem List:    Patient Active Problem List   Diagnosis Code    Pure hypercholesterolemia E78.00    Knee osteoarthritis M17.10    Tear of meniscus of knee S83.209A    Essential hypertension, benign I10    Total knee replacement status Z96.659    CKD (chronic kidney disease) stage 3, GFR 30-59 ml/min ([facility]) N18.30    Allergic rhinitis due to pollen J30.1    Impingement syndrome of right shoulder MCV 85.7 08/24/2022 06:17 AM    RDW 15.6 08/24/2022 06:17 AM     08/24/2022 06:17 AM       CMP:   Lab Results   Component Value Date/Time     08/24/2022 06:17 AM    K 3.7 08/24/2022 06:17 AM     08/24/2022 06:17 AM    CO2 26 08/24/2022 06:17 AM    BUN 19 08/24/2022 06:17 AM    CREATININE 0.9 08/24/2022 06:17 AM    GFRAA >60 08/24/2022 06:17 AM    GFRAA >60 02/15/2013 01:02 PM    AGRATIO 0.7 10/13/2021 08:15 AM    LABGLOM 60 08/24/2022 06:17 AM    GLUCOSE 124 08/24/2022 06:17 AM    PROT 7.7 10/13/2021 08:15 AM    PROT 7.4 02/15/2013 01:02 PM    CALCIUM 8.7 08/24/2022 06:17 AM    BILITOT 0.4 10/13/2021 08:15 AM    ALKPHOS 104 10/13/2021 08:15 AM    AST 23 10/13/2021 08:15 AM    ALT 20 10/13/2021 08:15 AM       POC Tests: No results for input(s): POCGLU, POCNA, POCK, POCCL, POCBUN, POCHEMO, POCHCT in the last 72 hours.     Coags:   Lab Results   Component Value Date/Time    PROTIME 13.4 08/23/2022 08:26 PM    INR 1.03 08/23/2022 08:26 PM    APTT 26.9 12/11/2012 09:37 AM       HCG (If Applicable): No results found for: PREGTESTUR, PREGSERUM, HCG, HCGQUANT     ABGs: No results found for: PHART, PO2ART, NWM2QOK, LEW2HRS, BEART, D2QOMEMW     Type & Screen (If Applicable):  Lab Results   Component Value Date    LABABO A 12/11/2012    [address] Negative 12/11/2012       Drug/Infectious Status (If Applicable):  Lab Results   Component Value Date/Time    HEPCAB Non-Reactive (Negative) 10/11/2011 11:45 AM       COVID-19 Screening (If Applicable):   Lab Results   Component Value Date/Time    COVID19 Indeterminate 10/11/2021 06:28 PM    COVID19 Not Detected 10/11/2021 04:30 PM           Anesthesia Evaluation  Patient summary reviewed and Nursing notes reviewed no history of anesthetic complications:   Airway: Mallampati: II  TM distance: >3 FB   Neck ROM: full  Mouth opening: > = 3 FB   Dental: normal exam         Pulmonary: breath sounds clear to auscultation      (-) pneumonia, COPD, asthma and shortness of breath                           Cardiovascular:  Exercise tolerance: good (>4 METS),   (+) hypertension:, hyperlipidemia    (-) past MI, CAD,  CHF and  HERRON      Rhythm: regular  Rate: normal                    Neuro/Psych:   (+) dementia (vascular dementia)   (-) CVA           GI/Hepatic/Renal: Neg GI/Hepatic/Renal ROS  (+) renal disease (SILVER ): CRI,      (-) liver disease       Endo/Other:        (-) diabetes mellitus               Abdominal:             Vascular: negative vascular ROS. Other Findings:           Anesthesia Plan      general     ASA 3     (81 yo F with PMHx of HTN, HLD, SILVER on CKD, vascular dementia who presents for right hip intertrochanteric fixation. Had fall from standing height, ambulates with walker often. Labs appear to be stable and T+S was obtained. Discussed consent with [facility]) who agreed with the anesthetic plan via telephone. The patient wishes to have their DNR status suspended to FULL CODE in the perioperative period. Their status will revert once recovered from anesthesia and upon their discharge from the immediate perioperative period. The appropriate paperwork was filled out and placed into the patient's chart. I discussed with the patient the risks and benefits to general anesthesia including possible anesthetic complications but not limited to: PONV, damage to the airway and surrounding structures (teeth, lips, gums, tongue, etc.), adverse reactions to medications, cardiac complications (MI, CHF, arrhythmias, etc.), respiratory complications (post-op ventilation, respiratory failure, etc.), neurologic complications (nerve damage, stroke, seizure), the potential for conversion to a general anesthetic, and death. The patient was given the opportunity to ask questions and all questions were answered to the patient's satisfaction.  )  Induction: intravenous.     MIPS: Postoperative opioids intended and Prophylactic antiemetics administered. Anesthetic plan and risks discussed with patient. This pre-anesthesia assessment may be used as a history and physical.    DOS STAFF ADDENDUM:    Pt seen and examined, chart reviewed (including anesthesia, drug and allergy history). No interval changes to history and physical examination. Anesthetic plan, risks, benefits, alternatives, and personnel involved discussed with patient. Patient verbalized an understanding and agrees to proceed.       Dimitrios Nguyen MD  August 24, 2022  1:36 PM

## 2022-08-24 NOTE — PROGRESS NOTES
Pharmacy Medication Reconciliation Note     List of medications Keila Fleming is currently taking is complete. Source of information:   1. [address]    Notes regarding home medications:   1. Facility reports all AM meds administered PTA in the ED  2.  ED provider reordered all appropriate medications     Facility denies administering any OTC or herbal medications    Jam Bergman, Pharmacy Intern  8/23/2022  9:23 PM

## 2022-08-24 NOTE — CONSULTS
[facility] Orthopedic Surgery  Consult Note    Patient: Fortunato Boast  Admit Date: 8/23/2022  Requesting Physician: Carlos Cameron DO  Room: [place] [address]/1757-89    Chief complaint: Right hip pain    HPI: Fortunato Boast is a 82 y.o. female who presented to [facility] ER yesterday after she apparently fell at her skilled nursing facility, [address] [place] [address]. She ambulates with a walker often. I called and spoke with her daughter-in-law/ELISSA Paulson Jackie who corroborates this. Describes pain in the right hip of moderate to severe intensity and of aching nature since yesterday which is relieved by pain medication partially. Denies new numbness/tingling. Imaging review of right hip via plain films demonstrated: Intertrochanteric right hip fracture impacted into varus. She does have baseline dementia. Not on anticoagulants  Non-smoker    Medical History:  Past Medical History:   Diagnosis Date    Advance directive discussed with patient     Asked to bring in:  tells me she is DNR    Chronic kidney disease (CKD) stage G3a/A1, moderately decreased glomerular filtration rate (GFR) between 45-59 mL/min/1.73 square meter and albuminuria creatinine ratio less than 30 mg/g (HCC)     Gout     Gout     HTN (hypertension)     Hypercholesteremia      Past Surgical History:   Procedure Laterality Date    HYSTERECTOMY (CERVIX STATUS UNKNOWN)  1971    RECTOCELE REPAIR  2005    Dr. Genny Barajas  Dec 2012    Dr Pablo Phy :  Right    [address]         Social History:    reports that she has never smoked.  She has never used smokeless tobacco.    Family History:        Problem Relation Age of Onset    Cirrhosis Mother         Blood transfusion: Hep C    Diabetes Mother     Hypertension Mother     Emphysema Father                Medications:  ALL MEDICATIONS HAVE BEEN REVIEWED:  Scheduled:   amLODIPine  10 mg Oral Daily    atorvastatin  40 mg Oral Daily    busPIRone  5 mg Oral BID cyanocobalamin  1,000 mcg Oral Daily    donepezil  10 mg Oral Nightly    melatonin  6 mg Oral Nightly    Vitamin D  1,000 Units Oral Daily    sodium chloride flush  5-40 mL IntraVENous 2 times per day    lidocaine  1 patch Topical Q12H     Continuous:   sodium chloride      lactated ringers 75 mL/hr at 08/24/22 0737     PRN:sodium chloride flush, sodium chloride, ondansetron **OR** ondansetron, polyethylene glycol, acetaminophen, oxyCODONE, morphine    Allergies: Allergies   Allergen Reactions    Morphine      Vomiting      Vicodin [Hydrocodone-Acetaminophen]      Vomiting/nausea    Meclizine      Makes her feels worse     Zetia [Ezetimibe] Itching    Lipitor [Atorvastatin] Other (See Comments)     myalgia       Review of Systems:  Constitutional: Negative for fever, chills, fatigue. Skin:  Negative for pruritis, rash  Eyes: Negative for photophobia and visual disturbance. ENT:  Negative for rhinorrhea, epistaxis, sore throat  Respiratory:  Negative for cough and shortness of breath. Cardiovascular: Negative for chest pain. Gastrointestinal: Negative for nausea, vomiting, diarrhea. Genitourinary: Negative for dysuria and difficulty urinating. Neurological: Negative for confusion, dysarthria, tremors, seizures. Psychiatric:  Negative for depression or anxiety  Musculoskeletal:  Positive for right hip pain    Objective:  Vitals:    08/24/22 0735   BP: 102/63   Pulse: 96   Resp: 18   Temp: 98.7 °F (37.1 °C)   SpO2: 92%      Physical Examination:  GENERAL: No apparent distress, well-nourished  SKIN:  Warm and dry  EYES: Nonicteric. ENT: Mucous membranes moist  HEAD: Normocephalic, atraumatic  RESPIRATORY: Resp easy and unlabored  CARDIOVASCULAR: Regular rate and rhythm  GI: Abdomen soft, nontender  NEURO: Awake and alert. No speech defect  PSYCHIATRIC: Appropriate affect; not agitated  MUSCULOSKELETAL: Right hip  Inspection: On exam there are no ulcerations, rashes or lesions about the right hip. There is pain to palpation of the right hip. Range of motion deferred. Right hip is slightly shortened compared to the left. Motor: Intact DF/PF on the right. Sensation: Grossly intact to light touch throughout the right lower extremity in all nerve distributions. Vascular: 2+ right DP pulse. Labs reviewed:  Recent Labs     08/23/22 2026 08/24/22 0617   WBC 13.8* 8.9   HGB 12.5 10.9*   HCT 38.2 32.6*    235     Recent Labs     08/23/22 2026 08/24/22 0617    136   K 3.6 3.7   CL 98* 101   CO2 26 26   BUN 21* 19   CREATININE 1.0 0.9   GLUCOSE 124* 124*   CALCIUM 9.6 8.7     Recent Labs     08/23/22 2026   INR 1.03   PROTIME 13.4       Lab Results   Component Value Date    COLORU Yellow 08/23/2022    CLARITYU Clear 08/23/2022    PHUR 5.0 08/23/2022    GLUCOSEU Negative 08/23/2022    BLOODU TRACE (A) 08/23/2022    LEUKOCYTESUR Negative 08/23/2022    BILIRUBINUR Negative 08/23/2022    UROBILINOGEN 0.2 08/23/2022    RBCUA 1 08/23/2022    WBCUA 1 08/23/2022    BACTERIA None Seen 08/23/2022       Imaging:  CT HEAD WO CONTRAST   Final Result   Head-      No acute intracranial abnormality. Stable pattern of atrophy and microangiopathic disease. ---      Cervical spine-      No acute fracture or subluxation of the cervical spine. C7-T1 mild degenerative anterolisthesis. Mild-to-moderate multilevel   spondylosis. CT CERVICAL SPINE WO CONTRAST   Final Result   Head-      No acute intracranial abnormality. Stable pattern of atrophy and microangiopathic disease. ---      Cervical spine-      No acute fracture or subluxation of the cervical spine. C7-T1 mild degenerative anterolisthesis. Mild-to-moderate multilevel   spondylosis. XR CHEST 1 VIEW   Final Result   CHEST:      Mild multifocal curvilinear opacities within the bilateral mid lungs and   lower left lung zone, atelectasis favored over pneumonia. Stable chronic   elevation of the right hemidiaphragm. RIGHT SHOULDER:      Suspected acute nondisplaced fracture through the greater tubercle of the   proximal right humerus. No evidence of dislocation. Chronic osteoarthritis   at the right [facility] and glenohumeral joints. Chronic right rotator cuff disease. RIGHT HIP:      Acute displaced intertrochanteric fracture of the proximal right femur. XR SHOULDER RIGHT (MIN 2 VIEWS)   Final Result   CHEST:      Mild multifocal curvilinear opacities within the bilateral mid lungs and   lower left lung zone, atelectasis favored over pneumonia. Stable chronic   elevation of the right hemidiaphragm. RIGHT SHOULDER:      Suspected acute nondisplaced fracture through the greater tubercle of the   proximal right humerus. No evidence of dislocation. Chronic osteoarthritis   at the right [facility] and glenohumeral joints. Chronic right rotator cuff disease. RIGHT HIP:      Acute displaced intertrochanteric fracture of the proximal right femur. XR HIP 2-3 VW W PELVIS RIGHT   Final Result   CHEST:      Mild multifocal curvilinear opacities within the bilateral mid lungs and   lower left lung zone, atelectasis favored over pneumonia. Stable chronic   elevation of the right hemidiaphragm. RIGHT SHOULDER:      Suspected acute nondisplaced fracture through the greater tubercle of the   proximal right humerus. No evidence of dislocation. Chronic osteoarthritis   at the right [facility] and glenohumeral joints. Chronic right rotator cuff disease. RIGHT HIP:      Acute displaced intertrochanteric fracture of the proximal right femur. XR ELBOW RIGHT (MIN 3 VIEWS)    (Results Pending)       IMPRESSION:  Right intertrochanteric hip fracture  Dementia  CKD  Principal Problem:    Closed right hip fracture, initial encounter [facility])  Active Problems:    Closed fracture of proximal end of right humerus  Resolved Problems:    * No resolved hospital problems.  *      RECOMMENDATIONS:  We discussed both operative and non-operative treatments with our recommendation being for operative fixation. After discussion of risks and benefits, the patient and her POA/daughter-in-law Gurinder Demarco agreed to proceed with surgery. - Schedule for right hip intramedullary nailing with Dr. Sri Davis today 2pm  - NPO  - NWB RLE  - Pain control  - DVT prophylaxis: Holding Anticoagulation today  - Appreciate pre-op clearance from medicine team  - Verify surgical consent  - Pre op orders placed   -Await PT/OT until postop  - Dispo: Await therapy recommendations postoperatively. Expect SNF. Patient denies tobacco use. I have reviewed imaging and plan with Dr. Sri Davis. The patient was seen and examined. All x-rays were reviewed. The patient does have severe rotator cuff arthropathy of the right shoulder. We will allow her to weight-bear as tolerated postoperatively. She does have a severely comminuted right intertrochanteric hip fracture. We will plan on performing a right hip trochanteric femoral nailing. I agree with the findings per Avery Moya. The patient agrees to proceed.   FLOR Helm - CNP  8/24/2022  8:38 AM

## 2022-08-24 NOTE — CARE COORDINATION
Noted admission from [facility] and  plans for hip fx repair today. Spoke with Bob Soriano at [facility] who reports patient is long term care / medicaid with bed hold days. She can return and will not need a precert or covid test to return. Will coordinate transfer upon d/c.    Electronically signed by Franc Crump on 8/24/2022 at 4:16 PM

## 2022-08-24 NOTE — PROGRESS NOTES
Hospitalist Progress Note  8/24/2022 9:47 AM    PCP: Jones Du    [id number]     Date of Admission: 8/23/2022                                                                                                                     HOSPITAL COURSE    Patient demographics:  The patient  Homar Sandra is a 82 y.o. female     Significant past medical history:   Patient Active Problem List   Diagnosis    Pure hypercholesterolemia    Knee osteoarthritis    Tear of meniscus of knee    Essential hypertension, benign    Total knee replacement status    CKD (chronic kidney disease) stage 3, GFR 30-59 ml/min (HCC)    Allergic rhinitis due to pollen    Impingement syndrome of right shoulder    Hyperuricemia    Acute metabolic encephalopathy    Pneumonia    Closed right [facility], [facility]    Closed fracture of proximal end of right humerus         Presenting symptoms:  fall    Diagnostic workup:      CONSULTS DURING ADMISSION :   IP CONSULT TO ORTHOPEDIC SURGERY  IP CONSULT TO HOSPITALIST  IP CONSULT TO SOCIAL WORK  IP CONSULT TO ORTHOPEDIC SURGERY      Patient was diagnosed with:  Traumatic closed right intertrochanteric proximal femur fracture  Traumatic closed right proximal humerus fracture  HTN    Treatment while inpatient:  Patient presented to the emergency department via EMS from [facility] with reports of a standing height fall landing on her right side                                                                                       ----------------------------------------------------------      SUBJECTIVE COMPLAINTS- follow up for femur fracture     Diet: Diet NPO      OBJECTIVE:   Patient Active Problem List   Diagnosis    Pure hypercholesterolemia    Knee osteoarthritis    Tear of meniscus of knee    Essential hypertension, benign    Total knee replacement status    CKD (chronic kidney disease) stage 3, GFR 30-59 ml/min (HCC)    Allergic rhinitis due to pollen    Impingement syndrome of right shoulder    Hyperuricemia    Acute metabolic encephalopathy    Pneumonia    Closed right hip fracture, initial encounter ([facility]    Closed fracture of proximal end of right humerus       Allergies  Morphine, Vicodin [hydrocodone-acetaminophen], Meclizine, Zetia [ezetimibe], and Lipitor [atorvastatin]    Medications    Scheduled Meds:   amLODIPine  10 mg Oral Daily    atorvastatin  40 mg Oral Daily    busPIRone  5 mg Oral BID    cyanocobalamin  1,000 mcg Oral Daily    donepezil  10 mg Oral Nightly    melatonin  6 mg Oral Nightly    Vitamin D  1,000 Units Oral Daily    sodium chloride flush  5-40 mL IntraVENous 2 times per day    lidocaine  1 patch Topical Q12H     Continuous Infusions:   sodium chloride      lactated ringers 75 mL/hr at 08/24/22 0737     PRN Meds:  sodium chloride flush, sodium chloride, ondansetron **OR** ondansetron, polyethylene glycol, acetaminophen, oxyCODONE, morphine    Vitals   Vitals /wt Patient Vitals for the past 8 hrs:   BP Temp Temp src Pulse Resp SpO2 Weight   08/24/22 0735 102/63 98.7 °F (37.1 °C) Oral 96 18 92 % --   08/24/22 0546 -- -- -- -- -- -- 141 lb 5 oz (64.1 kg)        72HR INTAKE/OUTPUT:    Intake/Output Summary (Last 24 hours) at 8/24/2022 0947  Last data filed at 8/24/2022 0737  Gross per 24 hour   Intake 779.96 ml   Output 800 ml   Net -20.04 ml       Exam:    Gen:   Alert and oriented ×3    Eyes: PERRL. No sclera icterus. No conjunctival injection. ENT: No discharge. Pharynx clear. External appearance of ears and nose normal.  Neck: Trachea midline. No obvious mass. Resp: No accessory muscle use. No crackles. No wheezes. No rhonchi. CV: Regular rate. Regular rhythm. No murmur or rub. No edema. GI: Non-tender. Non-distended. No hernia. Skin: Warm, dry, normal texture and turgor. Lymph: No cervical LAD. No supraclavicular LAD. M/S: / Ext.   Limited range of motion at the right hip joint  Neuro: CN 2-12 are intact,  no neurologic deficits Dictation software. Please disregard any translational errors.

## 2022-08-24 NOTE — H&P
Hospital Medicine History & Physical      PCP: Orlando Guzman    Date of Admission: 8/23/2022    Date of Service: Pt seen/examined on 8/23/2022 and Admitted to Inpatient     Chief Complaint:  fall      History Of Present Illness: The patient is a 82 y.o. female who presents to [facility] with PMHx: CKD, gout, HTN, vascular dementia, HLD, cognitive deficit visit, actinic keratosis, lumbar spine disc bulge, HTN    Resides at [address] noted on ECF paperwork as of 2022: DNR CC  No anticoagulation therapy    Patient presented to the emergency department via EMS from [facility] with reports of a standing height fall landing on her right side. No loss of consciousness. Patient complained of right arm pain and right hip pain  Patient reportedly at her cognitive and neuro baseline. ED work-up: Evidence of a traumatic closed right proximal humeral fracture, patient placed in a sling. And a traumatic closed right intertrochanteric proximal femur fracture. ED provider consulted with orthopedics: Dr. Jose Martin Barmbila for operative intervention  Preop labs: WBC 40.4, metabolic panel BUN 21, creatinine 1.0 GFR 53, INR 1.03. Type and screen completed. EKG sinus rhythm rate 96. X-ray of the right pelvis completed, right shoulder completed, plain chest x-ray completed, CT head also completed and CT cervical spine completed    On my exam the patient appears her age. She is complaining of pain. She has tenderness in the right proximal upper extremity no obvious bruising at present. No vascular compromise. Clavicle is nontender. Chest wall and ribs are nontender. Cervical thoracic and lumbar spine nontender. Pelvic girdle stable. Right lower extremity is shortened and slightly external rotation.   Positive strong femoral and dorsalis pedis pulse comparable to that of the left. Patient has sensation bilateral lower extremities. No injury of the left lower extremity or the left upper extremity. Head neck unremarkable. No facial trauma. Vital signs are stable. Patient does not recall exactly what happened. She does answer to her name and she seems to be appropriate and answering questions but she just has no idea what happened today. Past Medical History:        Diagnosis Date    Advance directive discussed with patient     Asked to bring in:  tells me she is DNR    Chronic kidney disease (CKD) stage G3a/A1, moderately decreased glomerular filtration rate (GFR) between 45-59 mL/min/1.73 square meter and albuminuria creatinine ratio less than 30 mg/g (HCC)     Gout     Gout     HTN (hypertension)     Hypercholesteremia        Past Surgical History:        Procedure Laterality Date    HYSTERECTOMY (CERVIX STATUS UNKNOWN)  1971    RECTOCELE REPAIR  2005    Dr. Genny Barajas  Dec 2012    Dr Pablo Phy :  Right    [address]         Medications Prior to Admission:    Prior to Admission medications    Medication Sig Start Date End Date Taking?  Authorizing Provider   atorvastatin (LIPITOR) 40 MG tablet Take 40 mg by mouth daily   Yes Historical Provider, MD   busPIRone (BUSPAR) 5 MG tablet Take 5 mg by mouth 2 times daily   Yes Historical Provider, MD   melatonin 3 MG TABS tablet Take 6 mg by mouth nightly   Yes Historical Provider, MD   donepezil (ARICEPT) 10 MG tablet Take 10 mg by mouth nightly   Yes Historical Provider, MD   vitamin D (CHOLECALCIFEROL) 25 MCG (1000 UT) TABS tablet Take 1,000 Units by mouth daily   Yes Historical Provider, MD   cyanocobalamin 1000 MCG tablet Take 1,000 mcg by mouth daily   Yes Historical Provider, MD   amLODIPine (NORVASC) 10 MG tablet Take 10 mg by mouth daily    Historical Provider, MD       Allergies:  Morphine, Vicodin [hydrocodone-acetaminophen], Meclizine, Zetia [ezetimibe], and Lipitor [atorvastatin]    Social History:  The patient currently lives ECF    TOBACCO:   reports that she has never smoked. She has never used smokeless tobacco.  ETOH:   reports current alcohol use. Family History:  Reviewed in detail and negative for DM, Early CAD, Cancer, CVA. Positive as follows:        Problem Relation Age of Onset    Cirrhosis Mother         Blood transfusion: Hep C    Diabetes Mother     Hypertension Mother     Emphysema Father                REVIEW OF SYSTEMS:    as noted in the HPI. All other systems reviewed and negative. PHYSICAL EXAM:    /71   Pulse 98   Temp 99.4 °F (37.4 °C) (Axillary)   Resp 17   Ht 5' 5\" (1.651 m)   Wt 140 lb 3.4 oz (63.6 kg)   SpO2 91%   BMI 23.33 kg/m²     General appearance: No apparent distress appears stated age and cooperative. HEENT Normal cephalic, atraumatic without obvious deformity. Pupils equal, round, and reactive to light. Extra ocular muscles intact. Conjunctivae/corneas clear. Neck: Supple, No jugular venous distention/bruits. Trachea midline without thyromegaly or adenopathy with full range of motion. Lungs: Clear to auscultation, bilaterally without Rales/Wheezes/Rhonchi with good respiratory effort. Heart: Regular rate and rhythm with Normal S1/S2 without murmurs, rubs or gallops, point of maximum impulse non-displaced  Abdomen: Soft, non-tender or non-distended without rigidity or guarding and positive bowel sounds all four quadrants. Extremities: Right upper extremity range of motion obviously limited, swelling and tenderness proximal humerus and shoulder. No vascular compromise. Left upper extremity without injury. Left lower extremity without injury. Right lower extremity slightly shortened and laterally rotated. Positive strong bilateral femoral and dorsalis pedis pulses. Sensation x4 extremities intact. Right upper extremity positive hand grasp and right radial pulse 2+ comparable to that of the left. Priority: Medium           PHYSICIANS CERTIFICATION:    I certify that Jt Melo is expected to be hospitalized for more than 2 midnights based on the following assessment and plan:      ASSESSMENT/PLAN:    Traumatic closed right intertrochanteric proximal femur fracture:2/2-> standing height fall  As evidenced on x-ray  Orthopedics consulted: Dr. Lulu Zhong  N.p.o. after midnight  CBC, metabolic panel, INR and type and screen have already been completed-> repeat CBC and metabolic panel in a.m. PT and OT will be needed following surgery  Pain management: Lidoderm patch, morphine IVP as needed, Roxicet and acetaminophen  Mild leukocytosis: 13.8, not indicating infection but rather a inflammatory reactive process  IVF overnight: Lactated Ringer's 75 mL/per hour  Bowel regimen ordered    Traumatic closed right proximal humerus fracture: Sling in place  Nonoperative  Orthopedics consulted  Nonweightbearing  Pain management: Acetaminophen, topical Voltaren Tiffanie/diclofenac,     HTN: Continue amlodipine, atorvastatin  Vascular dementia: Continue outpatient medications: Buspirone, donepezil, melatonin    DVT Prophylaxis: Lovenox  Diet: Diet NPO  Code Status: DNR-CC  PT/OT Eval Status: PT OT following operative intervention    Dispo -admit, inpatient       Mandi Merlos, APRN - CNP    Thank you Griffin Stark for the opportunity to be involved in this patient's care. If you have any questions or concerns please feel free to contact me at [phone]. This dictation was performed with a verbal recognition program (DRAGON) and it was checked for errors. It is possible that there are still dictated errors within this office note. If so, please bring any errors to my attention for an addendum. All efforts were made to ensure that this office note is accurate.

## 2022-08-24 NOTE — ED PROVIDER NOTES
[address]      Pt Name: Fortunato Boast  MRN: [id number]  [place] 1935  Date of evaluation: 8/23/2022  Provider: MILY Henry    This patient was not seen and evaluated by the attending physician. CHIEF COMPLAINT       Chief Complaint   Patient presents with    Fall    Hip Pain     Pt to ED via [facility] from [address] [place] [address] s/p fall. Per report, pt fell landing on her right side. Pt c/o right hip and right shoulder pain. Per report, pt has a hx of dementia and is at baseline mental status. Pt does not take blood thinners. CRITICAL CARE TIME   I performed a total Critical Care time of 15 minutes, excluding separately reportable procedures. There was a high probability of clinically significant/life threatening deterioration in the patient's condition which required my urgent intervention. Not limited to multiple reexaminations, discussions with attending physician and consultants. HISTORY OF PRESENT ILLNESS  (Location/Symptom, Timing/Onset, Context/Setting, Quality, Duration, Modifying Factors, Severity.)   Fortunato Boast is a 82 y.o. female who presents to the emergency department via EMS from the [address] [place] [address]. The patient has dementia and is reportedly at her baseline mental status per the nursing home. She tells me she was turning around by her front door when she lost her balance and landed onto her right side. She complains of pain in her right elbow, shoulder, and hip. She does not take blood thinners. She denies loss of consciousness or vomiting. Denies recent illness. Has history of hypertension, hyperlipidemia and chronic kidney disease. Nursing Notes were reviewed and I agree. REVIEW OF SYSTEMS    (2-9 systems for level 4, 10 or more for level 5)     Review of Systems   Constitutional:  Negative for fever. Respiratory:  Negative for shortness of breath.     Cardiovascular:  Negative     Father          SOCIAL HISTORY      reports that she has never smoked. She has never used smokeless tobacco. She reports current alcohol use. She reports that she does not use drugs. PHYSICAL EXAM    (up to 7 for level 4, 8 or more for level 5)     ED Triage Vitals [22 1900]   BP Temp Temp Source Heart Rate Resp SpO2 Height Weight   130/60 98.5 °F (36.9 °C) Oral 95 23 94 % 5' 4\" (1.626 m) 140 lb 3.4 oz (63.6 kg)       Physical Exam  Vitals and nursing note reviewed. Constitutional:       Appearance: Normal appearance. HENT:      Head: Normocephalic and atraumatic. Mouth/Throat:      Mouth: Mucous membranes are moist.   Eyes:      Pupils: Pupils are equal, round, and reactive to light. Cardiovascular:      Rate and Rhythm: Normal rate. Pulses: Normal pulses. Pulmonary:      Effort: Pulmonary effort is normal. No respiratory distress. Chest:      Chest wall: No tenderness. Abdominal:      Tenderness: There is no abdominal tenderness. There is no guarding or rebound. Musculoskeletal:      Right shoulder: Tenderness and bony tenderness present. Normal pulse. Cervical back: Normal range of motion. No tenderness. Right hip: Tenderness present. Decreased range of motion. Decreased strength. Skin:     General: Skin is warm. Neurological:      Mental Status: She is alert. Mental status is at baseline. DIAGNOSTIC RESULTS     EKG: All EKG's are interpreted by MILY Hernandez in the absence of a cardiologist.    EKG interpreted by myself - please refer to attending physician's note for complete EKG interpretation:    No evidence of acute ischemia or injury. RADIOLOGY:   Non-plain film images such as CT, Ultrasound and MRI are read by the radiologist. Plain radiographic images are visualized and preliminarily interpreted by MILY Hernandez with the below findings:    Reviewed radiologist's interpretation.      Interpretation per the Radiologist below, if available at the time of this note:    CT HEAD WO CONTRAST   Final Result   Head-      No acute intracranial abnormality. Stable pattern of atrophy and microangiopathic disease. ---      Cervical spine-      No acute fracture or subluxation of the cervical spine. C7-T1 mild degenerative anterolisthesis. Mild-to-moderate multilevel   spondylosis. CT CERVICAL SPINE WO CONTRAST   Final Result   Head-      No acute intracranial abnormality. Stable pattern of atrophy and microangiopathic disease. ---      Cervical spine-      No acute fracture or subluxation of the cervical spine. C7-T1 mild degenerative anterolisthesis. Mild-to-moderate multilevel   spondylosis. XR CHEST 1 VIEW   Final Result   CHEST:      Mild multifocal curvilinear opacities within the bilateral mid lungs and   lower left lung zone, atelectasis favored over pneumonia. Stable chronic   elevation of the right hemidiaphragm. RIGHT SHOULDER:      Suspected acute nondisplaced fracture through the greater tubercle of the   proximal right humerus. No evidence of dislocation. Chronic osteoarthritis   at the [facility] and glenohumeral joints. Chronic right rotator cuff disease. RIGHT HIP:      Acute displaced intertrochanteric fracture of the proximal right femur. XR SHOULDER RIGHT (MIN 2 VIEWS)   Final Result   CHEST:      Mild multifocal curvilinear opacities within the bilateral mid lungs and   lower left lung zone, atelectasis favored over pneumonia. Stable chronic   elevation of the right hemidiaphragm. RIGHT SHOULDER:      Suspected acute nondisplaced fracture through the greater tubercle of the   proximal right humerus. No evidence of dislocation. Chronic osteoarthritis   at the right [facility] and glenohumeral joints. Chronic right rotator cuff disease. RIGHT HIP:      Acute displaced intertrochanteric fracture of the proximal right femur.          XR HIP 2-3 VW W PELVIS RIGHT   Final Result   CHEST:      Mild multifocal curvilinear opacities within the bilateral mid lungs and   lower left lung zone, atelectasis favored over pneumonia. Stable chronic   elevation of the right hemidiaphragm. RIGHT SHOULDER:      Suspected acute nondisplaced fracture through the greater tubercle of the   proximal right humerus. No evidence of dislocation. Chronic osteoarthritis   at the right [facility] and glenohumeral joints. Chronic right rotator cuff disease. RIGHT HIP:      Acute displaced intertrochanteric fracture of the proximal right femur. XR ELBOW RIGHT (2 VIEWS)    (Results Pending)         LABS:  Labs Reviewed   CBC WITH AUTO DIFFERENTIAL - Abnormal; Notable for the following components:       Result Value    WBC 13.8 (*)     RDW 15.7 (*)     Neutrophils Absolute 12.0 (*)     Lymphocytes Absolute 0.9 (*)     All other components within normal limits   BASIC METABOLIC PANEL W/ REFLEX TO MG FOR LOW K - Abnormal; Notable for the following components:    Chloride 98 (*)     Glucose 124 (*)     BUN 21 (*)     GFR Non- 53 (*)     All other components within normal limits   URINALYSIS WITH REFLEX TO CULTURE - Abnormal; Notable for the following components:    Blood, Urine TRACE (*)     All other components within normal limits   PROTIME-INR   MICROSCOPIC URINALYSIS   BASIC METABOLIC PANEL W/ REFLEX TO MG FOR LOW K   CBC WITH AUTO DIFFERENTIAL   TYPE AND SCREEN       All other labs were within normal range or not returned as of this dictation. EMERGENCY DEPARTMENT COURSE and DIFFERENTIAL DIAGNOSIS/MDM:   Vitals:    Vitals:    08/23/22 1900 08/23/22 2245 08/23/22 2256 08/23/22 2332   BP: 130/60 136/71     Pulse: 95 98     Resp: 23 16 18   Temp: 98.5 °F (36.9 °C) 99.4 °F (37.4 °C)     TempSrc: Oral Axillary     SpO2: 94% 91%     Weight: 140 lb 3.4 oz (63.6 kg)      Height: 5' 4\" (1.626 m)  5' 5\" (1.651 m)      Patient afebrile not tachycardic not hypoxic.   She has right hip fracture as well as right shoulder fracture. Abrasion to elbow. Tetanus updated. Contacted orthopedic spoke with Dr. Leticia Mccormack. Advised to be n.p.o. consent printed and at the bedside. Patient updated. Will consult hospitalist.    CONSULTS:  [facility] CONSULT TO HOSPITALIST  IP CONSULT TO SOCIAL WORK    PROCEDURES:  Procedures      FINAL IMPRESSION      1. Closed fracture of right hip, initial encounter ([facility]    2. Closed fracture of proximal end of right humerus, unspecified fracture morphology, initial encounter          DISPOSITION/PLAN   DISPOSITION Admitted 08/23/2022 09:20:13 PM      PATIENT REFERRED TO:  No follow-up provider specified.     DISCHARGE MEDICATIONS:  Current Discharge Medication List          (Please note that portions of this note were completed with a voice recognition program.  Efforts were made to edit the dictations but occasionally words are mis-transcribed.)    Winifred Hernandez, [address]  08/24/22 4664

## 2022-08-24 NOTE — PROGRESS NOTES
Patient in bed, awake and alert to self and place only. Disoriented to time and situation. Patient took morning medications whole, no complications. Patient morning assessment done. Patient having pain in right hip, PRN and scheduled pain medications given as ordered. Patient has casas and it's draining well. Patient NPO for surgery later this afternoon. Patient IV fluids infusing as ordered. Phone consent obtained over the phone from jules Moser) and another nurse, consent placed in chart. Will continue to round on patient per unit protocol. Patient denies any other needs at this time. Call light and bedside table within reach. Will continue to monitor and reassess.   Electronically signed by Arjun Walsh RN on 8/24/2022 at 12:21 PM

## 2022-08-24 NOTE — PROGRESS NOTES
Pt is alert and oriented to self, denies pain at this time, VSS.  Report called to Florinda Pichardo on floor, will transfer to room [phone]

## 2022-08-24 NOTE — PLAN OF CARE
Problem: Discharge Planning  Goal: Discharge to home or other facility with appropriate resources  Outcome: Progressing  Flowsheets  Taken 8/24/2022 0510  Discharge to home or other facility with appropriate resources: Arrange for needed discharge resources and transportation as appropriate  Taken 8/23/2022 2319  Discharge to home or other facility with appropriate resources: Arrange for needed discharge resources and transportation as appropriate     Problem: Pain  Goal: Verbalizes/displays adequate comfort level or baseline comfort level  Outcome: Progressing  Flowsheets (Taken 8/24/2022 0510)  Verbalizes/displays adequate comfort level or baseline comfort level:   Encourage patient to monitor pain and request assistance   Assess pain using appropriate pain scale     Problem: Safety - Adult  Goal: Free from fall injury  Outcome: Progressing  Flowsheets (Taken 8/24/2022 0510)  Free From Fall Injury: Instruct family/caregiver on patient safety     Problem: ABCDS Injury Assessment  Goal: Absence of physical injury  Outcome: Progressing  Flowsheets (Taken 8/24/2022 0510)  Absence of Physical Injury: Implement safety measures based on patient assessment     Problem: Skin/Tissue Integrity  Goal: Absence of new skin breakdown  Description: 1. Monitor for areas of redness and/or skin breakdown  2. Assess vascular access sites hourly  3. Every 4-6 hours minimum:  Change oxygen saturation probe site  4. Every 4-6 hours:  If on nasal continuous positive airway pressure, respiratory therapy assess nares and determine need for appliance change or resting period. Outcome: Progressing  Note: Patient assessed for risk of skin breakdown. Patient turned every 2 hours and repositioned as needed. Skin is clean and dry, dressings monitored for saturation.  Patient educated on helping preventing skin breakdown p skin heal.

## 2022-08-24 NOTE — ED PROVIDER NOTES
Pt Name: Leela Britt  MRN: [id number]  [place] 1935  Date of evaluation: 8/23/2022    EKG Interpretation    The purpose of this note is for preliminary EKG interpretation only. This patient was seen independently by the mid-level provider and was not seen by this provider. EKG visualized preliminarily interpreted by myself shows sinus rhythm with a rate of 96. Axis is -24. Nonspecific T wave flattening in leads I and aVL. Otherwise no acute injury or acute ischemia.   Intervals are normal.        Bo Pritchard MD  08/24/22 6424

## 2022-08-24 NOTE — PROGRESS NOTES
Spoke with pt daughter in law Sara Varela, with pt permission. Pt states Sara Varela is her . [phone] is her phone number. It is also ok to talk to her son Laureen Music also.

## 2022-08-24 NOTE — PLAN OF CARE
Problem: Discharge Planning  Goal: Discharge to home or other facility with appropriate resources  8/24/2022 0753 by Shavonne Becerra RN  Flowsheets (Taken 8/24/2022 0510 by Elia Choe RN)  Discharge to home or other facility with appropriate resources: Arrange for needed discharge resources and transportation as appropriate  8/24/2022 0510 by Elia Choe RN  Outcome: Progressing  Flowsheets  Taken 8/24/2022 0510  Discharge to home or other facility with appropriate resources: Arrange for needed discharge resources and transportation as appropriate  Taken 8/23/2022 2319  Discharge to home or other facility with appropriate resources: Arrange for needed discharge resources and transportation as appropriate     Problem: Pain  Goal: Verbalizes/displays adequate comfort level or baseline comfort level  8/24/2022 0753 by Shavonne Becerra RN  Flowsheets (Taken 8/24/2022 0510 by Elia Choe RN)  Verbalizes/displays adequate comfort level or baseline comfort level:   Encourage patient to monitor pain and request assistance   Assess pain using appropriate pain scale  8/24/2022 0510 by Elia Choe RN  Outcome: Progressing  Flowsheets (Taken 8/24/2022 0510)  Verbalizes/displays adequate comfort level or baseline comfort level:   Encourage patient to monitor pain and request assistance   Assess pain using appropriate pain scale     Problem: Safety - Adult  Goal: Free from fall injury  8/24/2022 0753 by Shavonne Becerra RN  Flowsheets (Taken 8/24/2022 0510 by Elia Choe RN)  Free From Fall Injury: Instruct family/caregiver on patient safety  8/24/2022 0510 by Elia Choe RN  Outcome: Progressing  Flowsheets (Taken 8/24/2022 0510)  Free From Fall Injury: Instruct family/caregiver on patient safety     Problem: Skin/Tissue Integrity  Goal: Absence of new skin breakdown  Description: 1. Monitor for areas of redness and/or skin breakdown  2. Assess vascular access sites hourly  3.

## 2022-08-25 LAB
ANION GAP SERPL CALCULATED.3IONS-SCNC: 8 MMOL/L (ref 3–16)
BUN BLDV-MCNC: 14 MG/DL (ref 7–20)
CALCIUM SERPL-MCNC: 8.6 MG/DL (ref 8.3–10.6)
CHLORIDE BLD-SCNC: 104 MMOL/L (ref 99–110)
CO2: 27 MMOL/L (ref 21–32)
CREAT SERPL-MCNC: 0.9 MG/DL (ref 0.6–1.2)
GFR AFRICAN AMERICAN: >60
GFR NON-AFRICAN AMERICAN: 60
GLUCOSE BLD-MCNC: 130 MG/DL (ref 70–99)
HCT VFR BLD CALC: 31.3 % (ref 36–48)
HEMOGLOBIN: 10.5 G/DL (ref 12–16)
MCH RBC QN AUTO: 29.2 PG (ref 26–34)
MCHC RBC AUTO-ENTMCNC: 33.5 G/DL (ref 31–36)
MCV RBC AUTO: 87.2 FL (ref 80–100)
PDW BLD-RTO: 15.4 % (ref 12.4–15.4)
PLATELET # BLD: 201 K/UL (ref 135–450)
PMV BLD AUTO: 7.6 FL (ref 5–10.5)
POTASSIUM SERPL-SCNC: 4.3 MMOL/L (ref 3.5–5.1)
RBC # BLD: 3.59 M/UL (ref 4–5.2)
SODIUM BLD-SCNC: 139 MMOL/L (ref 136–145)
WBC # BLD: 9.8 K/UL (ref 4–11)

## 2022-08-25 PROCEDURE — 6370000000 HC RX 637 (ALT 250 FOR IP): Performed by: ORTHOPAEDIC SURGERY

## 2022-08-25 PROCEDURE — 97166 OT EVAL MOD COMPLEX 45 MIN: CPT

## 2022-08-25 PROCEDURE — 6360000002 HC RX W HCPCS: Performed by: ORTHOPAEDIC SURGERY

## 2022-08-25 PROCEDURE — 94760 N-INVAS EAR/PLS OXIMETRY 1: CPT

## 2022-08-25 PROCEDURE — 36415 COLL VENOUS BLD VENIPUNCTURE: CPT

## 2022-08-25 PROCEDURE — 99024 POSTOP FOLLOW-UP VISIT: CPT | Performed by: NURSE PRACTITIONER

## 2022-08-25 PROCEDURE — 97530 THERAPEUTIC ACTIVITIES: CPT

## 2022-08-25 PROCEDURE — 1200000000 HC SEMI PRIVATE

## 2022-08-25 PROCEDURE — 85027 COMPLETE CBC AUTOMATED: CPT

## 2022-08-25 PROCEDURE — 97535 SELF CARE MNGMENT TRAINING: CPT

## 2022-08-25 PROCEDURE — 6370000000 HC RX 637 (ALT 250 FOR IP): Performed by: NURSE PRACTITIONER

## 2022-08-25 PROCEDURE — 2580000003 HC RX 258: Performed by: ORTHOPAEDIC SURGERY

## 2022-08-25 PROCEDURE — 2700000000 HC OXYGEN THERAPY PER DAY

## 2022-08-25 PROCEDURE — 80048 BASIC METABOLIC PNL TOTAL CA: CPT

## 2022-08-25 PROCEDURE — 97162 PT EVAL MOD COMPLEX 30 MIN: CPT

## 2022-08-25 PROCEDURE — APPNB45 APP NON BILLABLE 31-45 MINUTES: Performed by: NURSE PRACTITIONER

## 2022-08-25 RX ORDER — ACETAMINOPHEN 325 MG/1
650 TABLET ORAL 3 TIMES DAILY
Status: DISCONTINUED | OUTPATIENT
Start: 2022-08-25 | End: 2022-08-26 | Stop reason: HOSPADM

## 2022-08-25 RX ORDER — TRAMADOL HYDROCHLORIDE 50 MG/1
50 TABLET ORAL EVERY 6 HOURS PRN
Qty: 20 TABLET | Refills: 0 | Status: SHIPPED | OUTPATIENT
Start: 2022-08-25 | End: 2022-08-26 | Stop reason: SDUPTHER

## 2022-08-25 RX ORDER — ASPIRIN 325 MG
325 TABLET, DELAYED RELEASE (ENTERIC COATED) ORAL DAILY
Qty: 30 TABLET | Refills: 0 | Status: SHIPPED | OUTPATIENT
Start: 2022-08-25 | End: 2022-09-24

## 2022-08-25 RX ADMIN — TRAMADOL HYDROCHLORIDE 100 MG: 50 TABLET, COATED ORAL at 11:07

## 2022-08-25 RX ADMIN — AMLODIPINE BESYLATE 10 MG: 10 TABLET ORAL at 10:00

## 2022-08-25 RX ADMIN — HYDROMORPHONE HYDROCHLORIDE 0.5 MG: 1 INJECTION, SOLUTION INTRAMUSCULAR; INTRAVENOUS; SUBCUTANEOUS at 07:12

## 2022-08-25 RX ADMIN — ACETAMINOPHEN 650 MG: 325 TABLET ORAL at 21:22

## 2022-08-25 RX ADMIN — BUSPIRONE HYDROCHLORIDE 5 MG: 5 TABLET ORAL at 10:00

## 2022-08-25 RX ADMIN — HYDROXYZINE HYDROCHLORIDE 10 MG: 10 TABLET ORAL at 10:00

## 2022-08-25 RX ADMIN — Medication 6 MG: at 21:22

## 2022-08-25 RX ADMIN — CEFAZOLIN 2000 MG: 2 INJECTION, POWDER, FOR SOLUTION INTRAMUSCULAR; INTRAVENOUS at 06:49

## 2022-08-25 RX ADMIN — ATORVASTATIN CALCIUM 40 MG: 40 TABLET, FILM COATED ORAL at 11:15

## 2022-08-25 RX ADMIN — BUSPIRONE HYDROCHLORIDE 5 MG: 5 TABLET ORAL at 21:23

## 2022-08-25 RX ADMIN — ASPIRIN 81 MG: 81 TABLET, COATED ORAL at 21:23

## 2022-08-25 RX ADMIN — DONEPEZIL HYDROCHLORIDE 10 MG: 10 TABLET, FILM COATED ORAL at 21:23

## 2022-08-25 RX ADMIN — ACETAMINOPHEN 650 MG: 325 TABLET ORAL at 15:00

## 2022-08-25 RX ADMIN — ASPIRIN 81 MG: 81 TABLET, COATED ORAL at 10:00

## 2022-08-25 RX ADMIN — Medication 10 ML: at 10:00

## 2022-08-25 ASSESSMENT — PAIN DESCRIPTION - PAIN TYPE
TYPE: SURGICAL PAIN

## 2022-08-25 ASSESSMENT — PAIN DESCRIPTION - ONSET
ONSET: ON-GOING

## 2022-08-25 ASSESSMENT — PAIN DESCRIPTION - ORIENTATION
ORIENTATION: RIGHT

## 2022-08-25 ASSESSMENT — PAIN SCALES - GENERAL
PAINLEVEL_OUTOF10: 7
PAINLEVEL_OUTOF10: 1
PAINLEVEL_OUTOF10: 1
PAINLEVEL_OUTOF10: 6
PAINLEVEL_OUTOF10: 4

## 2022-08-25 ASSESSMENT — PAIN DESCRIPTION - DESCRIPTORS
DESCRIPTORS: ACHING;SORE
DESCRIPTORS: ACHING;SORE
DESCRIPTORS: SORE
DESCRIPTORS: ACHING
DESCRIPTORS: ACHING

## 2022-08-25 ASSESSMENT — PAIN SCALES - PAIN ASSESSMENT IN ADVANCED DEMENTIA (PAINAD)
TOTALSCORE: 1
FACIALEXPRESSION: 1
BREATHING: 1
FACIALEXPRESSION: 0
NEGVOCALIZATION: 0
TOTALSCORE: 4
CONSOLABILITY: 0
CONSOLABILITY: 1
BREATHING: 0
NEGVOCALIZATION: 0
BODYLANGUAGE: 1
BODYLANGUAGE: 1

## 2022-08-25 ASSESSMENT — PAIN DESCRIPTION - LOCATION
LOCATION: HIP
LOCATION: HIP
LOCATION: HIP;LEG
LOCATION: HIP
LOCATION: HIP

## 2022-08-25 ASSESSMENT — PAIN - FUNCTIONAL ASSESSMENT
PAIN_FUNCTIONAL_ASSESSMENT: PREVENTS OR INTERFERES SOME ACTIVE ACTIVITIES AND ADLS

## 2022-08-25 ASSESSMENT — PAIN DESCRIPTION - FREQUENCY
FREQUENCY: CONTINUOUS
FREQUENCY: CONTINUOUS
FREQUENCY: INTERMITTENT
FREQUENCY: INTERMITTENT

## 2022-08-25 NOTE — PROGRESS NOTES
Occupational Therapy  Facility/Department: [address] ORTHOPEDICS  Occupational Therapy Initial Assessment    Name: Candelario Siddiqui  : 1935  MRN: [id number]  Date of Service: 2022    Discharge Recommendations:  3-5 sessions per week, Patient would benefit from continued therapy after discharge        Candelario Siddiqui scored a 15/24 on the AM-PAC ADL Inpatient form. Current research shows that an AM-PAC score of 17 or less is typically not associated with a discharge to the patient's home setting. Based on the patient's AM-PAC score and their current ADL deficits, it is recommended that the patient have 3-5 sessions per week of Occupational Therapy at d/c to increase the patient's independence. Please see assessment section for further patient specific details. If patient discharges prior to next session this note will serve as a discharge summary. Please see below for the latest assessment towards goals. Patient Diagnosis(es): The primary encounter diagnosis was Closed fracture of right hip, initial encounter ([facility] [address] Diagnoses of Closed fracture of proximal end of right humerus, unspecified fracture morphology, initial encounter and Other closed displaced fracture of proximal end of right humerus, initial encounter were also pertinent to this visit. Past Medical History:  has a past medical history of Advance directive discussed with patient, Chronic kidney disease (CKD) stage G3a/A1, moderately decreased glomerular filtration rate (GFR) between 45-59 mL/min/1.73 square meter and albuminuria creatinine ratio less than 30 mg/g ([facility] [address] Gout, Gout, HTN (hypertension), and Hypercholesteremia. Past Surgical History:  has a past surgical history that includes Hysterectomy (); Varicose vein surgery; Rectocele repair (); Total knee arthroplasty (Dec 2012); and Hip fracture surgery (Right, 2022).     Treatment Diagnosis: impaired ADL/fxl mobility    Assessment   Performance deficits / Impairments: Decreased functional mobility ; Decreased strength;Decreased endurance;Decreased ADL status; Decreased safe awareness;Decreased high-level IADLs;Decreased cognition;Decreased balance;Decreased ROM  Assessment: 83 yo female admitted for a fall with R hip intertrochanteric fx. S/P 8/24 RIGHT HIP INTERTROCHAN TFN. WBAT RLE. Found to have RUE greater tubercle proximal humerus and Chronic right rotator cuff disease now WBAT. PMH: Dementia, HTN, Gout, TKA, CKD. PTA, pt lives at [facility] in [address] [place] [address] with assist for IADL, IADL and fxl mobility with RW (unsure specific level of assist). Today, pt functioning below baseline limited by R hip pain and cognition. Pt requiring assist x2 throughout- Mod A x2 bed mobility and Min/Mod x2 sit><stand wtihin stedy. Pt requiring Max A donning brief and anticipate Max A LB and Min-Mod A UB based on pain, balance, cognition, endurance and PLOF. RUE shoulder flexion <75*. Cont acute OT to address above deficits, rec OT 3-5x/week to decrease caregiver burden  Treatment Diagnosis: impaired ADL/fxl mobility  Prognosis: Fair  Decision Making: Medium Complexity  REQUIRES OT FOLLOW-UP: Yes  Activity Tolerance  Activity Tolerance: Patient limited by pain; Patient limited by fatigue;Treatment limited secondary to decreased cognition        Plan   Plan  Times per Week: 3-5  Current Treatment Recommendations: Strengthening, ROM, Balance training, Functional mobility training, Endurance training, Pain management, Safety education & training, Patient/Caregiver education & training, Self-Care / ADL, Home management training, Modalities, Positioning     Restrictions  Restrictions/Precautions  Restrictions/Precautions: Fall Risk, Weight Bearing  Lower Extremity Weight Bearing Restrictions  Right Lower Extremity Weight Bearing: Weight Bearing As Tolerated  Upper Extremity Weight Bearing Restrictions  Right Upper Extremity Weight Bearing: Weight Bearing As Tolerated  Left Upper Extremity Weight Bearing: Weight Bearing As Tolerated    Subjective   General  Chart Reviewed: Yes  Patient assessed for rehabilitation services?: Yes  Additional Pertinent Hx: 83 yo female admitted for a fall with R hip intertrochanteric fx. S/P 8/24 RIGHT HIP INTERTROCHAN TFN. WBAT RLE. Found to have RUE greater tubercle proximal humerus and Chronic right rotator cuff disease now WBAT. PMH: Dementia, HTN, Gout, TKA, CKD  Family / Caregiver Present: No  Referring Practitioner: Norman Masterson MD  Diagnosis: Fall  Subjective  Subjective: Pt sleeping in bed upon arrival and awakes to voice, pt agreeable to OT/PT eval and OOB activity. Pt with R hip pain with activity- unable to rate d/t cognition. Based on visual/verbal reaction, worse with bed mobility  General Comment  Comments: RN ok to see     Social/Functional History  Social/Functional History  Type of Home: Facility (LTC)  Home Layout: One level  Home Access: Level entry  Bathroom Toilet: Handicap height  Bathroom Accessibility: Accessible  Home Equipment: Walker, rolling  ADL Assistance: Needs assistance (per case management, assist required. unsure what level)  Ambulation Assistance: Needs assistance (with RW per chart)  Active : No       Objective           Observation/Palpation  Observation: mild shaking with mobility- likely d/t pain (RN aware)  Safety Devices  Type of Devices: Call light within reach; Chair alarm in place;Gait belt;Left in chair;Nurse notified; Patient at risk for falls  Restraints  Restraints Initially in Place: No  Balance  Sitting:  (CGA at EOB in prep for tx)  Standing:  (Min A within BUE on stedy bar for 2, ~15 sec static stand)  Gait  Overall Level of Assistance:  Total assistance (EOB>recliner with bruce stedy d/t low tolerance of tx within stedy)     AROM:  (RUE shoulder flexion ~75*, otherwise WFL)  Strength: Generally decreased, functional  Coordination: Generally decreased, functional  Tone: Normal  ADL  LE Dressing: Maximum assistance  LE Dressing Skilled Clinical Factors: donning brief. assit threading while seated and pant over hip standing in stedy  Additional Comments: Anticipate Max A LB and Min-Mod A UB based on pain, balance, cognition, endurance and PLOF     Activity Tolerance  Activity Tolerance: Patient limited by pain;Treatment limited secondary to decreased cognition  Bed mobility  Supine to Sit: Moderate assistance;2 Person assistance  Sit to Supine: Unable to assess (up in chair at end of session)  Bed Mobility Comments: HOB elevated  Transfers  Sit to stand: Moderate assistance;Minimal assistance;2 Person assistance  Stand to sit: Minimal assistance; Moderate assistance;2 Person assistance  Transfer Comments: within stedy. difficulty reaching RUE on purple bar, uses side bar at ~75*  Vision  Vision: Impaired  Vision Exceptions: Wears glasses at all times  Hearing  Hearing: Within functional limits  Cognition  Overall Cognitive Status: Exceptions  Following Commands: Follows one step commands with increased time; Follows one step commands with repetition  Attention Span: Attends with cues to redirect; Difficulty attending to directions  Memory: Decreased recall of recent events;Decreased short term memory;Decreased long term memory  Safety Judgement: Decreased awareness of need for safety;Decreased awareness of need for assistance  Problem Solving: Decreased awareness of errors  Insights: Not aware of deficits  Initiation: Requires cues for all  Sequencing: Requires cues for all  Cognition Comment: hx Dementia  Orientation  Overall Orientation Status: Impaired  Orientation Level:  (oriented to name only)                  Education Given To: Patient  Education Provided: Role of Therapy;Plan of Care;Transfer Training;Orientation  Education Method: Demonstration;Verbal  Barriers to Learning: Cognition  Education Outcome: Verbalized understanding;Continued education needed                      AM-PAC Score        AM-PAC Inpatient Daily Activity Raw Score: 15 (08/25/22 1224)  AM-PAC Inpatient ADL T-Scale Score : 34.69 (08/25/22 1224)  ADL Inpatient CMS 0-100% Score: 56.46 (08/25/22 1224)  ADL Inpatient CMS G-Code Modifier : CK (08/25/22 1224)    Goals  Short Term Goals  Time Frame for Short term goals: prior to d/c  Short Term Goal 1: bed mobility Mod A x1  Short Term Goal 2: tolerate 5 min seated UB ADL with SBA  Short Term Goal 3: ADL tx Mod A x1  Short Term Goal 4: tolerate 1 min static stand with BUE support with CGA in prep for fxl mobility and standing ADL  Short Term Goal 5: toileting Mod A  Patient Goals   Patient goals : none stated       Therapy Time   Individual Concurrent Group Co-treatment   Time In 1032         Time Out 1110         Minutes 38         Timed Code Treatment Minutes: 23 Minutes (15 eval. 8 ADL 15 TA)       Alisia Paredes, DENIA, OTR/L

## 2022-08-25 NOTE — PROGRESS NOTES
Physical Therapy  Facility/Department: [address] ORTHOPEDICS  Physical Therapy Initial Assessment    Name: Germaine Oshea  : 1935  MRN: [id number]  Date of Service: 2022    Discharge Recommendations:  Therapy recommended at discharge, [address] with PT (3-5x/wk)   PT Equipment Recommendations  Equipment Needed: No  Other: defer to next level of care    Germaine Oshea scored a  on the AM-PAC short mobility form. Current research shows that an AM-PAC score of 17 or less is typically not associated with a discharge to the patient's home setting. Based on the patient's AM-PAC score and their current functional mobility deficits, it is recommended that the patient have 3-5 sessions per week of Physical Therapy at d/c to increase the patient's independence. Please see assessment section for further patient specific details. If patient discharges prior to next session this note will serve as a discharge summary. Please see below for the latest assessment towards goals. Patient Diagnosis(es): The primary encounter diagnosis was Closed fracture of right hip, initial encounter ([facility] [address] Diagnoses of Closed fracture of proximal end of right humerus, unspecified fracture morphology, initial encounter and Other closed displaced fracture of proximal end of right humerus, initial encounter were also pertinent to this visit. Past Medical History:  has a past medical history of Advance directive discussed with patient, Chronic kidney disease (CKD) stage G3a/A1, moderately decreased glomerular filtration rate (GFR) between 45-59 mL/min/1.73 square meter and albuminuria creatinine ratio less than 30 mg/g ([facility] [address] Gout, Gout, HTN (hypertension), and Hypercholesteremia. Past Surgical History:  has a past surgical history that includes Hysterectomy (); Varicose vein surgery; Rectocele repair (); Total knee arthroplasty (Dec 2012); and Hip fracture surgery (Right, 2022).     Assessment   Body Structures, Functions, Activity Limitations Requiring Skilled Therapeutic Intervention: Decreased functional mobility ; Decreased ROM; Decreased strength; Increased pain  Assessment: Pt is an 81 y/o female admitted after a fall at [facility] with R hip fx and R proximal humerus fx. S/p RIGHT HIP INTERTROCHAN TFN on 8/24 and WBAT RLE. Conservative management of acute nondisplaced fracture through the greater tubercle of the proximal right humerus with WBAT. Pt is normally able to ambulate with RW at facility. Today, pt limited by pain and required assist of 2 and bruce peñaloza for transfer to chair. Recommend continued skilled inpatient PT at facility upon d/c to decrease burden of care. Will continue to follow. Treatment Diagnosis: difficulty walking  Therapy Prognosis: Good  Decision Making: Medium Complexity  History: Per Orlando Ayala MD \"Radha Molina is a 82 y.o. female who presented to [facility] ER yesterday after she apparently fell at her skilled nursing facility, [address] [place] [address]. She ambulates with a walker often. I called and spoke with her daughter-in-law/POTO Rayo who corroborates this. Describes pain in the right hip of moderate to severe intensity and of aching nature since yesterday which is relieved by pain medication partially. Denies new numbness/tingling. Imaging review of right hip via plain films demonstrated: Intertrochanteric right hip fracture impacted into varus. She does have baseline dementia. \" S/p RIGHT HIP INTERTROCHAN TFN on 8/24 and WBAT RLE. Conservative management of acute nondisplaced fracture through the greater tubercle of the proximal right humerus with WBAT.   Exam: functional mobility  Clinical Presentation: evolving  Barriers to Learning: cognition, pain  Requires PT Follow-Up: Yes  Activity Tolerance  Activity Tolerance: Patient limited by pain;Treatment limited secondary to decreased cognition     Plan   Plan  Plan: 3-5 times per week  Current Treatment Recommendations: Strengthening, ROM, Functional mobility training, Transfer training, Gait training, Positioning, Cognitive reorientation, Safety education & training  Safety Devices  Type of Devices: Call light within reach, Chair alarm in place, Gait belt, Left in chair, Nurse notified  Restraints  Restraints Initially in Place: No     Restrictions  Restrictions/Precautions  Restrictions/Precautions: Fall Risk, Weight Bearing  Lower Extremity Weight Bearing Restrictions  Right Lower Extremity Weight Bearing: Weight Bearing As Tolerated  Upper Extremity Weight Bearing Restrictions  Right Upper Extremity Weight Bearing: Weight Bearing As Tolerated  Left Upper Extremity Weight Bearing: Weight Bearing As Tolerated     Subjective   General  Chart Reviewed: Yes  Patient assessed for rehabilitation services?: Yes  Additional Pertinent Hx: Per Angeline Hernandez MD \"Radha King is a 82 y.o. female who presented to [facility] ER yesterday after she apparently fell at her skilled nursing facility, [address] [place] [address]. She ambulates with a walker often. I called and spoke with her daughter-in-law/ELISSA Cortes who corroborates this. Describes pain in the right hip of moderate to severe intensity and of aching nature since yesterday which is relieved by pain medication partially. Denies new numbness/tingling. Imaging review of right hip via plain films demonstrated: Intertrochanteric right hip fracture impacted into varus. She does have baseline dementia. \"  S/p RIGHT HIP INTERTROCHAN TFN on 8/24 and WBAT RLE. Conservative management of acute nondisplaced fracture through the greater tubercle of the proximal right humerus with WBAT.   Response To Previous Treatment: Not applicable  Family / Caregiver Present: No  Referring Practitioner: Angeline Hernandez MD  Referral Date : 08/24/22  Diagnosis: Comminuted right intertrochanteric hip fracture; acute nondisplaced fracture through the greater tubercle of the  proximal right humerus  Follows Commands: Within Functional Limits  Subjective  Subjective: Pt pleasantly confused. Agreeable to PT eval and treat. In bed upon arrival.  Reports pain in R hip. Social/Functional History  Social/Functional History  Type of Home: Facility (LT)  Home Layout: One level  Home Access: Level entry  Bathroom Toilet: Handicap height  Bathroom Accessibility: Accessible  Home Equipment: Walker, rolling  Ambulation Assistance: Needs assistance (with RW per chart)  Active : No  Vision/Hearing  Vision  Vision: Within Functional Limits  Hearing  Hearing: Within functional limits    Cognition   Orientation  Overall Orientation Status: Impaired  Orientation Level:  (oriented to name only)     Objective   AROM RLE (degrees)  RLE General AROM: limited by post-op pain  AROM LLE (degrees)  LLE AROM : WFL  Strength RLE  Comment: functionally fair, limited by pain  Strength LLE  Strength LLE: WFL           Bed mobility  Supine to Sit: Moderate assistance;2 Person assistance  Sit to Supine: Unable to assess (up in chair at end of session)    Transfers  Sit to Stand: Minimal Assistance; Moderate Assistance;2 Person Assistance (to bruce stedy)  Stand to sit: Minimal Assistance; Moderate Assistance;2 Person Assistance  Bed to Chair: Dependent/Total (via bruce stedy)    Ambulation  Comments: unable to attempt due to pain     Balance  Posture: Fair  Sitting - Static: Good;-  Sitting - Dynamic: Good;-  Standing - Static: Fair         AM-PAC Score  AM-PAC Inpatient Mobility Raw Score : 9 (08/25/22 1139)  AM-PAC Inpatient T-Scale Score : 30.55 (08/25/22 1139)  Mobility Inpatient CMS 0-100% Score: 81.38 (08/25/22 1139)  Mobility Inpatient CMS G-Code Modifier : CM (08/25/22 1139)             Goals  Short Term Goals  Time Frame for Short term goals: upon d/c  Short term goal 1: bed mobility Candiod  Short term goal 2: transfers Candido  Short term goal 3: initiate gait as able  Patient Goals   Patient goals : unable to state due to cognition Education  Patient Education  Education Given To: Patient  Education Provided: Role of Therapy;Plan of Care;Orientation;Equipment  Education Method: Demonstration;Verbal  Barriers to Learning: Cognition  Education Outcome: Continued education needed; Unable to demonstrate understanding      Therapy Time   Individual Concurrent Group Co-treatment   Time In 6468         Time Out 1110         Minutes 38         Timed Code Treatment Minutes: 23 Minutes       Electronically signed by Fermin Sy, PT [id number] on 8/25/2022 at 11:49 AM

## 2022-08-25 NOTE — PLAN OF CARE
Problem: Discharge Planning  Goal: Discharge to home or other facility with appropriate resources  Outcome: Progressing  Flowsheets (Taken 8/25/2022 1056)  Discharge to home or other facility with appropriate resources: Identify barriers to discharge with patient and caregiver     Problem: Pain  Goal: Verbalizes/displays adequate comfort level or baseline comfort level  Outcome: Progressing  Flowsheets (Taken 8/24/2022 0510 by Dia Bowles RN)  Verbalizes/displays adequate comfort level or baseline comfort level:   Encourage patient to monitor pain and request assistance   Assess pain using appropriate pain scale     Problem: Safety - Adult  Goal: Free from fall injury  Outcome: Progressing  Flowsheets (Taken 8/24/2022 0510 by Dia Bowles RN)  Free From Fall Injury: Instruct family/caregiver on patient safety     Problem: ABCDS Injury Assessment  Goal: Absence of physical injury  Outcome: Progressing  Flowsheets (Taken 8/24/2022 0510 by Dia Bowles RN)  Absence of Physical Injury: Implement safety measures based on patient assessment     Problem: Skin/Tissue Integrity  Goal: Absence of new skin breakdown  Description: 1. Monitor for areas of redness and/or skin breakdown  2. Assess vascular access sites hourly  3. Every 4-6 hours minimum:  Change oxygen saturation probe site  4. Every 4-6 hours:  If on nasal continuous positive airway pressure, respiratory therapy assess nares and determine need for appliance change or resting period.   Outcome: Progressing

## 2022-08-25 NOTE — PROGRESS NOTES
[facility]   Progress Note    CHIEF COMPLAINT/DIAGNOSIS: S/p RIGHT hip IM nailing     SUBJECTIVE: The patient is sitting up in th bed; describes mild hip pain. Has not yet worked with therapy  She is eating breakfast.  Denies new issues. OBJECTIVE  Physical    VITALS:  /61   Pulse 66   Temp 98.4 °F (36.9 °C) (Oral)   Resp 18   Ht 5' 5\" (1.651 m)   Wt 140 lb 6.9 oz (63.7 kg)   SpO2 98%   BMI 23.37 kg/m²     GENERAL: Alert and oriented x3, in no acute distress. MUSCULOSKELETAL: Able to dorsi and plantarflex the ankle on operative side without issue. INCISION:  Covered with post-op dressing; clean dry and intact. ROM: Limited secondary pain and swelling. Sensory:  Intact to light touch in peroneal and tibial distributions. Vascular:   2+ DP pulses with brisk cap refill;  calf soft and nontender. Data    ALL MEDICATIONS HAVE BEEN REVIEWED    CBC:   Recent Labs     08/23/22 2026 08/24/22  0617 08/25/22  0532   WBC 13.8* 8.9 9.8   HGB 12.5 10.9* 10.5*   HCT 38.2 32.6* 31.3*    235 201     BMP:   Recent Labs     08/23/22 2026 08/24/22  0617 08/25/22  0532    136 139   K 3.6 3.7 4.3   CL 98* 101 104   CO2 26 26 27   BUN 21* 19 14   CREATININE 1.0 0.9 0.9     INR:   Recent Labs     08/23/22 2026   INR 1.03     Intra-op films show stabilization of fx with IM nail in good position. ASSESSMENT:  S/p RIGHT hip IM nailing (8/25/22), POD#1  HTN  Dementia  HLD    PLAN:   - WB status:  WBAT through operative extremity   - DVT prophylaxis: Lovenox as inpt, will d/c with ASA 325mg daily x 30 days.  - PT/OT  - Pain Control: Current regimen; tramadol Rx for d/c in chart. Due to orthopaedic surgical procedure/condition, patient may require pain medication for up to 6-8 weeks. - Expected post op acute blood loss anemia: H/H today: 10.5/31.3  - ID:  Ancef x 2 doses post-op completed.    - Disposition: per primary team; ok to d/c from our end once medically stable and dispo needs met. Follow-up in two weeks with Dr. Silvia Harrison.   Office # [phone]    FLOR Leyva - CNP  8/25/2022  9:29 AM

## 2022-08-25 NOTE — PROGRESS NOTES
Hospitalist Progress Note  8/25/2022 9:38 AM    PCP: Kasi Shukla    [phone]     Date of Admission: 8/23/2022                                                                                                                     HOSPITAL COURSE    Patient demographics:  The patient  Kasey Manzo is a 82 y.o. female     Significant past medical history:   Patient Active Problem List   Diagnosis    Pure hypercholesterolemia    Knee osteoarthritis    Tear of meniscus of knee    Essential hypertension, benign    Total knee replacement status    CKD (chronic kidney disease) stage 3, GFR 30-59 ml/min ([facility])    Allergic rhinitis due to pollen    Impingement syndrome of right shoulder    Hyperuricemia    Acute metabolic encephalopathy    Pneumonia    Closed fracture of right hip ([facility])    Closed fracture of proximal end of right humerus         Presenting symptoms:  fall    Diagnostic workup:      CONSULTS DURING ADMISSION :   IP CONSULT TO ORTHOPEDIC SURGERY  IP CONSULT TO HOSPITALIST  IP CONSULT TO SOCIAL WORK  IP CONSULT TO ORTHOPEDIC SURGERY      Patient was diagnosed with:  Traumatic closed right intertrochanteric proximal femur fracture  Traumatic closed right proximal humerus fracture  HTN    Treatment while inpatient:  Patient presented to the emergency department via EMS from [facility] with reports of a standing height fall landing on her right side. Patient was diagnosed with a right intertrochanteric fracture. Orthopedic surgery was consulted and patient underwent ORIF. Patient tolerated the procedure well and doing well with physical therapy.   Patient also also scored close to the right humerus of proximal fracture that was nondisplaced and orthopedic surgery recommended conservative treatment.                                                                                   ----------------------------------------------------------      SUBJECTIVE COMPLAINTS- follow up for femur fracture     Diet: ADULT DIET; Regular      OBJECTIVE:   Patient Active Problem List   Diagnosis    Pure hypercholesterolemia    Knee osteoarthritis    Tear of meniscus of knee    Essential hypertension, benign    Total knee replacement status    CKD (chronic kidney disease) stage 3, GFR 30-59 ml/min ([facility])    Allergic rhinitis due to pollen    Impingement syndrome of right shoulder    Hyperuricemia    Acute metabolic encephalopathy    Pneumonia    Closed fracture of right hip ([facility])    Closed fracture of proximal end of right humerus       Allergies  Morphine, Vicodin [hydrocodone-acetaminophen], Meclizine, Zetia [ezetimibe], and Lipitor [atorvastatin]    Medications    Scheduled Meds:   acetaminophen  650 mg Oral TID    sodium chloride flush  5-40 mL IntraVENous 2 times per day    aspirin  81 mg Oral BID    amLODIPine  10 mg Oral Daily    atorvastatin  40 mg Oral Daily    busPIRone  5 mg Oral BID    donepezil  10 mg Oral Nightly    melatonin  6 mg Oral Nightly     Continuous Infusions:   sodium chloride 100 mL/hr at 08/24/22 1701    sodium chloride       PRN Meds:  sodium chloride flush, sodium chloride, HYDROmorphone **OR** HYDROmorphone, traMADol **OR** traMADol, hydrOXYzine HCl, ondansetron **OR** ondansetron, polyethylene glycol, acetaminophen    Vitals   Vitals /wt Patient Vitals for the past 8 hrs:   BP Temp Temp src Pulse Resp SpO2 Weight   08/25/22 0917 -- -- -- -- 18 98 % --   08/25/22 0720 120/61 98.4 °F (36.9 °C) Oral 66 -- 91 % --   08/25/22 0712 -- -- -- -- 17 -- --   08/25/22 0534 -- -- -- -- -- -- 140 lb 6.9 oz (63.7 kg)        72HR INTAKE/OUTPUT:    Intake/Output Summary (Last 24 hours) at 8/25/2022 0938  Last data filed at 8/25/2022 0649  Gross per 24 hour   Intake 1207.76 ml   Output 1500 ml   Net -292.24 ml       Exam:    Gen:   Alert and oriented ×3    Eyes: PERRL. No sclera icterus. No conjunctival injection. ENT: No discharge. Pharynx clear. External appearance of ears and nose normal.  Neck: Trachea midline. No obvious mass. Resp: No accessory muscle use. No crackles. No wheezes. No rhonchi. CV: Regular rate. Regular rhythm. No murmur or rub. No edema. GI: Non-tender. Non-distended. No hernia. Skin: Warm, dry, normal texture and turgor. Lymph: No cervical LAD. No supraclavicular LAD. M/S: / Ext. Limited range of motion at the right hip joint  Neuro: CN 2-12 are intact,  no neurologic deficits noted. PT/INR:   Recent Labs     08/23/22 2026   PROTIME 13.4   INR 1.03     APTT: No results for input(s): APTT in the last 72 hours. CBC:   Recent Labs     08/23/22 2026 08/24/22  0617 08/25/22  0532   WBC 13.8* 8.9 9.8   HGB 12.5 10.9* 10.5*   HCT 38.2 32.6* 31.3*   MCV 87.2 85.7 87.2    235 201       BMP:   Recent Labs     08/23/22 2026 08/24/22  0617 08/25/22  0532    136 139   K 3.6 3.7 4.3   CL 98* 101 104   CO2 26 26 27   BUN 21* 19 14   CREATININE 1.0 0.9 0.9       LIVER PROFILE: No results for input(s): ALKPHOS, AST, ALT, ALB, BILIDIR, BILITOT, ALKPHOS in the last 72 hours. No results for input(s): AMYLASE in the last 72 hours. No results for input(s): LIPASE in the last 72 hours. UA:  Recent Labs     08/23/22 2056   WBCUA 1   RBCUA 1       TROPONIN: No results for input(s): Adonica Hoose in the last 72 hours. Lab Results   Component Value Date/Time    URRFLXCULT Not Indicated 08/23/2022 08:56 PM       No results for input(s): TSHREFLEX in the last 72 hours. No components found for: [id number]  POC GLUCOSE:  No results for input(s): POCGLU in the last 72 hours. No results for input(s): LABA1C in the last 72 hours.  No results found for: LABA1C      ASSESSMENT AND PLAN  Traumatic closed right intertrochanteric proximal femur fracture:  ORIF 8/24  PT and OT     Pain management:   Lidoderm patch, morphine IVP as needed, Roxicet and acetaminophen      Mild leukocytosis: 13.8,     Bowel regimen ordered  IVF     Traumatic closed right proximal humerus fracture:   Sling in place  Nonoperative  Nonweightbearing       HTN: Continue amlodipine, atorvastatin    Vascular dementia:   Continue outpatient medications: Buspirone, donepezil, melatonin        Code Status: DNR-CC        Dispo -to extended-care facility in a.m. The patient and / or the family were informed of the results of any tests, a time was given to answer questions, a plan was proposed and they agreed with plan. Ena Chinchilla MD    This note was transcribed using [employer]. Please disregard any translational errors.

## 2022-08-26 VITALS
HEIGHT: 65 IN | WEIGHT: 141.09 LBS | BODY MASS INDEX: 23.51 KG/M2 | TEMPERATURE: 98.2 F | RESPIRATION RATE: 18 BRPM | OXYGEN SATURATION: 95 % | SYSTOLIC BLOOD PRESSURE: 120 MMHG | DIASTOLIC BLOOD PRESSURE: 67 MMHG | HEART RATE: 71 BPM

## 2022-08-26 LAB
ANION GAP SERPL CALCULATED.3IONS-SCNC: 5 MMOL/L (ref 3–16)
BUN BLDV-MCNC: 19 MG/DL (ref 7–20)
CALCIUM SERPL-MCNC: 8.5 MG/DL (ref 8.3–10.6)
CHLORIDE BLD-SCNC: 106 MMOL/L (ref 99–110)
CO2: 31 MMOL/L (ref 21–32)
CREAT SERPL-MCNC: 0.8 MG/DL (ref 0.6–1.2)
GFR AFRICAN AMERICAN: >60
GFR NON-AFRICAN AMERICAN: >60
GLUCOSE BLD-MCNC: 92 MG/DL (ref 70–99)
HCT VFR BLD CALC: 28.5 % (ref 36–48)
HEMOGLOBIN: 9.7 G/DL (ref 12–16)
MCH RBC QN AUTO: 29.5 PG (ref 26–34)
MCHC RBC AUTO-ENTMCNC: 34 G/DL (ref 31–36)
MCV RBC AUTO: 86.7 FL (ref 80–100)
PDW BLD-RTO: 15.7 % (ref 12.4–15.4)
PLATELET # BLD: 191 K/UL (ref 135–450)
PMV BLD AUTO: 7.6 FL (ref 5–10.5)
POTASSIUM SERPL-SCNC: 4.2 MMOL/L (ref 3.5–5.1)
RBC # BLD: 3.28 M/UL (ref 4–5.2)
SODIUM BLD-SCNC: 142 MMOL/L (ref 136–145)
WBC # BLD: 10.6 K/UL (ref 4–11)

## 2022-08-26 PROCEDURE — 80048 BASIC METABOLIC PNL TOTAL CA: CPT

## 2022-08-26 PROCEDURE — 85027 COMPLETE CBC AUTOMATED: CPT

## 2022-08-26 PROCEDURE — 6370000000 HC RX 637 (ALT 250 FOR IP): Performed by: ORTHOPAEDIC SURGERY

## 2022-08-26 PROCEDURE — APPNB45 APP NON BILLABLE 31-45 MINUTES: Performed by: NURSE PRACTITIONER

## 2022-08-26 PROCEDURE — 36415 COLL VENOUS BLD VENIPUNCTURE: CPT

## 2022-08-26 PROCEDURE — 99024 POSTOP FOLLOW-UP VISIT: CPT | Performed by: NURSE PRACTITIONER

## 2022-08-26 PROCEDURE — 6370000000 HC RX 637 (ALT 250 FOR IP): Performed by: NURSE PRACTITIONER

## 2022-08-26 RX ORDER — TRAMADOL HYDROCHLORIDE 50 MG/1
50 TABLET ORAL EVERY 6 HOURS PRN
Qty: 28 TABLET | Refills: 0 | Status: SHIPPED | OUTPATIENT
Start: 2022-08-26 | End: 2022-09-02

## 2022-08-26 RX ORDER — POLYETHYLENE GLYCOL 3350 17 G/17G
17 POWDER, FOR SOLUTION ORAL DAILY PRN
Qty: 527 G | Refills: 1 | Status: SHIPPED | OUTPATIENT
Start: 2022-08-26 | End: 2022-09-25

## 2022-08-26 RX ADMIN — ASPIRIN 81 MG: 81 TABLET, COATED ORAL at 11:05

## 2022-08-26 RX ADMIN — ATORVASTATIN CALCIUM 40 MG: 40 TABLET, FILM COATED ORAL at 11:05

## 2022-08-26 RX ADMIN — BUSPIRONE HYDROCHLORIDE 5 MG: 5 TABLET ORAL at 11:03

## 2022-08-26 RX ADMIN — TRAMADOL HYDROCHLORIDE 50 MG: 50 TABLET, COATED ORAL at 11:04

## 2022-08-26 RX ADMIN — ACETAMINOPHEN 650 MG: 325 TABLET ORAL at 15:30

## 2022-08-26 RX ADMIN — AMLODIPINE BESYLATE 10 MG: 10 TABLET ORAL at 11:08

## 2022-08-26 RX ADMIN — ACETAMINOPHEN 650 MG: 325 TABLET ORAL at 11:04

## 2022-08-26 RX ADMIN — TRAMADOL HYDROCHLORIDE 100 MG: 50 TABLET, COATED ORAL at 16:58

## 2022-08-26 ASSESSMENT — PAIN DESCRIPTION - FREQUENCY: FREQUENCY: CONTINUOUS

## 2022-08-26 ASSESSMENT — PAIN SCALES - GENERAL
PAINLEVEL_OUTOF10: 4
PAINLEVEL_OUTOF10: 8
PAINLEVEL_OUTOF10: 6
PAINLEVEL_OUTOF10: 0

## 2022-08-26 ASSESSMENT — PAIN DESCRIPTION - LOCATION
LOCATION: HIP
LOCATION: HIP

## 2022-08-26 ASSESSMENT — PAIN DESCRIPTION - ORIENTATION
ORIENTATION: RIGHT
ORIENTATION: RIGHT

## 2022-08-26 ASSESSMENT — PAIN DESCRIPTION - DESCRIPTORS: DESCRIPTORS: ACHING

## 2022-08-26 ASSESSMENT — PAIN - FUNCTIONAL ASSESSMENT: PAIN_FUNCTIONAL_ASSESSMENT: PREVENTS OR INTERFERES SOME ACTIVE ACTIVITIES AND ADLS

## 2022-08-26 ASSESSMENT — PAIN DESCRIPTION - PAIN TYPE: TYPE: ACUTE PAIN

## 2022-08-26 ASSESSMENT — PAIN DESCRIPTION - ONSET: ONSET: ON-GOING

## 2022-08-26 NOTE — PLAN OF CARE
Problem: Discharge Planning  Goal: Discharge to home or other facility with appropriate resources  Outcome: Progressing  Flowsheets (Taken 8/26/2022 0737)  Discharge to home or other facility with appropriate resources:   Identify barriers to discharge with patient and caregiver   Identify discharge learning needs (meds, wound care, etc)     Problem: Pain  Goal: Verbalizes/displays adequate comfort level or baseline comfort level  Outcome: Progressing  Flowsheets (Taken 8/26/2022 0737)  Verbalizes/displays adequate comfort level or baseline comfort level: Encourage patient to monitor pain and request assistance     Problem: Safety - Adult  Goal: Free from fall injury  Outcome: Progressing  Flowsheets (Taken 8/26/2022 0737)  Free From Fall Injury: Instruct family/caregiver on patient safety     Problem: ABCDS Injury Assessment  Goal: Absence of physical injury  Outcome: Progressing  Flowsheets (Taken 8/26/2022 0737)  Absence of Physical Injury: Implement safety measures based on patient assessment     Problem: Skin/Tissue Integrity  Goal: Absence of new skin breakdown  Description: 1. Monitor for areas of redness and/or skin breakdown  2. Assess vascular access sites hourly  3. Every 4-6 hours minimum:  Change oxygen saturation probe site  4. Every 4-6 hours:  If on nasal continuous positive airway pressure, respiratory therapy assess nares and determine need for appliance change or resting period. Outcome: Progressing  Note: Pt assessed for skin break down. Skin was warm and dry to touch. Pt cannot regulate head of bed without assistance. Pt being turned or repositioned at least every 2 hours to prevent skin breakdown. Will continue to monitor and assess. no

## 2022-08-26 NOTE — PROGRESS NOTES
[facility]   Progress Note    CHIEF COMPLAINT/DIAGNOSIS: S/p RIGHT hip IM nailing     SUBJECTIVE: The patient is sitting up in the bed with friend at bedside. She reports her pain is minimal in the operative hip. She is eating and drinking well. She is hopeful discharge to SNF today. OBJECTIVE  Physical    VITALS:  /67   Pulse 71   Temp 98.2 °F (36.8 °C) (Oral)   Resp 18   Ht 5' 5\" (1.651 m)   Wt 141 lb 1.5 oz (64 kg)   SpO2 95%   BMI 23.48 kg/m²     GENERAL: Alert and oriented x3, in no acute distress. MUSCULOSKELETAL: Able to dorsi and plantarflex the ankle on operative side without issue. INCISION:  Covered with post-op dressing; clean dry and intact. ROM: Limited secondary pain and swelling. Sensory:  Intact to light touch in peroneal and tibial distributions. Vascular:   2+ DP pulses with brisk cap refill;  calf soft and nontender. Data    ALL MEDICATIONS HAVE BEEN REVIEWED    CBC:   Recent Labs     08/24/22  0617 08/25/22  0532 08/26/22  0511   WBC 8.9 9.8 10.6   HGB 10.9* 10.5* 9.7*   HCT 32.6* 31.3* 28.5*    201 191     BMP:   Recent Labs     08/24/22  0617 08/25/22  0532 08/26/22  0511    139 142   K 3.7 4.3 4.2    104 106   CO2 26 27 31   BUN 19 14 19   CREATININE 0.9 0.9 0.8     INR:   Recent Labs     08/23/22 2026   INR 1.03     Intra-op films show stabilization of fx with IM nail in good position. ASSESSMENT:  S/p RIGHT hip IM nailing (8/25/22), POD#2  HTN  Dementia  HLD    PLAN:   - WB status:  WBAT through operative extremity   - DVT prophylaxis: Lovenox as inpt, will d/c with ASA 325mg daily x 30 days.  - PT/OT  - Pain Control: Current regimen; tramadol Rx for d/c in chart. Due to orthopaedic surgical procedure/condition, patient may require pain medication for up to 6-8 weeks. - Expected post op acute blood loss anemia: H/H today: 9.7/28.5  - ID:  Ancef x 2 doses post-op completed.    - Disposition: per primary team; ok to d/c from our end once medically stable and dispo needs met. Follow-up in two weeks with Dr. Anuja Espinal.   Office # [phone]    FLOR Orellana CNP  8/26/2022  11:29 AM

## 2022-08-26 NOTE — PROGRESS NOTES
Pt is oriented to self only and is pleasantly confused. She is able to carry on a conversation but is easily distracted and forgets what she is talking about mid sentence. Pt is able to get up with an assist of 2 and a stedy lift. 2/4 bed rails up, bed in lowest position, fall precautions in place, call light within reach. Morning medications given whole with no complications. Dressing on R hip is clean, dry and intact. Pt given PRN pain medication for surgical pain in R hip that she rates 6/10. Will cont to monitor and reassess.  Electronically signed by Timbo Mandujano RN on 8/26/2022 at 11:27 AM

## 2022-08-26 NOTE — PROGRESS NOTES
Patient alert and oriented x4, discharged to facility with documented belongings. IV removed with no complications. Transported out of [facility] by stretcher.  Electronically signed by Ayse Reeder RN on 8/26/2022 at 6:36 PM

## 2022-08-26 NOTE — PROGRESS NOTES
Patient alert in room, O2 on with nasal cannula on the side. Patient has a flight of ideas. Unable to focus and respond to one question. No complaints of pain. Legs elevated in bed with SCD boots on and working properly. No IV access relayed by prior nurse. Patient removed mepilex applied to skin tear. Bed in lowest position. Call light within reach. Medications administered and taken whole. Patient instructed to call nurse for needs.

## 2022-08-27 NOTE — DISCHARGE SUMMARY
Hospital Medicine Discharge Summary      Patient ID: Dyan Estes , [id number]     Patient's PCP: Carley Andre    Admit Date: 8/23/2022     Discharge Date: 8/26/2022      Admitting Physician: Evy Costa DO    Discharge Physician: Leon King MD     Discharge Diagnoses: Active Hospital Problems    Diagnosis Date Noted    Closed fracture of proximal end of right humerus [S42.201A] 08/24/2022     Priority: Medium    Closed fracture of right hip [facility] [S72.001A] 08/23/2022     Priority: Medium         The patient was seen and examined on the day of discharge and this discharge summary is in conjunction with any daily progress note from day of discharge.     HOSPITAL COURSE      Patient demographics:  The patient  Dyan Estes is a 82 y.o. female      Significant past medical history:       Patient Active Problem List   Diagnosis    Pure hypercholesterolemia    Knee osteoarthritis    Tear of meniscus of knee    Essential hypertension, benign    Total knee replacement status    CKD (chronic kidney disease) stage 3, GFR 30-59 ml/min ([facility])    Allergic rhinitis due to pollen    Impingement syndrome of right shoulder    Hyperuricemia    Acute metabolic encephalopathy    Pneumonia    Closed fracture of right hip ([facility])    Closed fracture of proximal end of right humerus            Presenting symptoms:  fall     Diagnostic workup:   XR HIP 2-3 VW W PELVIS RIGHT  XR SHOULDER RIGHT  CT CERVICAL SPINE WO CONTRAST  CT HEAD WO CONTRAST  XR ELBOW RIGHT   XR HIP RIGHT       CONSULTS DURING ADMISSION :   IP CONSULT TO ORTHOPEDIC SURGERY  IP CONSULT TO HOSPITALIST  IP CONSULT TO SOCIAL WORK  IP CONSULT TO ORTHOPEDIC SURGERY        Patient was diagnosed with:  Traumatic closed right intertrochanteric proximal femur fracture  Traumatic closed right proximal humerus fracture  HTN     Treatment while inpatient:  Patient presented to the emergency department via EMS from [facility] with reports of a standing height fall landing on her right side. Patient was diagnosed with a right intertrochanteric fracture. Orthopedic surgery was consulted and patient underwent ORIF. Patient tolerated the procedure well and doing well with physical therapy. Patient also also scored close to the right humerus of proximal fracture that was nondisplaced and orthopedic surgery recommended conservative treatment. Discharge Condition:  stable:    Discharged to:  skilled nursing  facility    Activity:   as tolerated: Follow Up: Follow-up with the [facility]: For convenience and continuity at follow-up the following most recent labs are provided:      CBC:   Lab Results   Component Value Date/Time    WBC 10.6 08/26/2022 05:11 AM    HGB 9.7 08/26/2022 05:11 AM    HCT 28.5 08/26/2022 05:11 AM     08/26/2022 05:11 AM       RENAL:   Lab Results   Component Value Date/Time     08/26/2022 05:11 AM    K 4.2 08/26/2022 05:11 AM    K 3.7 08/24/2022 06:17 AM     08/26/2022 05:11 AM    CO2 31 08/26/2022 05:11 AM    BUN 19 08/26/2022 05:11 AM    CREATININE 0.8 08/26/2022 05:11 AM           Discharge Medications:      Medication List        START taking these medications      aspirin 325 MG EC tablet  Take 1 tablet by mouth daily     polyethylene glycol 17 g packet  Commonly known as: GLYCOLAX  Take 17 g by mouth daily as needed for Constipation     traMADol 50 MG tablet  Commonly known as: ULTRAM  Take 1 tablet by mouth every 6 hours as needed for Pain for up to 7 days.             CONTINUE taking these medications      amLODIPine 10 MG tablet  Commonly known as: NORVASC     atorvastatin 40 MG tablet  Commonly known as: LIPITOR     busPIRone 5 MG tablet  Commonly known as: BUSPAR     cyanocobalamin 1000 MCG tablet     donepezil 10 MG tablet  Commonly known as: ARICEPT     melatonin 3 MG Tabs tablet

## 2022-09-04 ENCOUNTER — APPOINTMENT (OUTPATIENT)
Dept: GENERAL RADIOLOGY | Age: 87
End: 2022-09-04
Payer: MEDICARE

## 2022-09-04 ENCOUNTER — HOSPITAL ENCOUNTER (EMERGENCY)
Age: 87
Discharge: HOME | End: 2022-09-04
Payer: MEDICARE

## 2022-09-04 VITALS
SYSTOLIC BLOOD PRESSURE: 123 MMHG | HEART RATE: 75 BPM | DIASTOLIC BLOOD PRESSURE: 70 MMHG | RESPIRATION RATE: 20 BRPM | WEIGHT: 138.89 LBS | TEMPERATURE: 97.7 F | OXYGEN SATURATION: 94 % | BODY MASS INDEX: 23.11 KG/M2

## 2022-09-04 DIAGNOSIS — T81.41XA INFECTION FOLLOWING A PROCEDURE, SUPERFICIAL INCISIONAL SURGICAL SITE, INITIAL ENCOUNTER: Primary | ICD-10-CM

## 2022-09-04 LAB
A/G RATIO: 0.9 (ref 1.1–2.2)
ALBUMIN SERPL-MCNC: 3.2 G/DL (ref 3.4–5)
ALP BLD-CCNC: 186 U/L (ref 40–129)
ALT SERPL-CCNC: 19 U/L (ref 10–40)
ANION GAP SERPL CALCULATED.3IONS-SCNC: 8 MMOL/L (ref 3–16)
AST SERPL-CCNC: 19 U/L (ref 15–37)
BASOPHILS ABSOLUTE: 0 K/UL (ref 0–0.2)
BASOPHILS RELATIVE PERCENT: 0.4 %
BILIRUB SERPL-MCNC: 1.7 MG/DL (ref 0–1)
BUN BLDV-MCNC: 18 MG/DL (ref 7–20)
CALCIUM SERPL-MCNC: 9.3 MG/DL (ref 8.3–10.6)
CHLORIDE BLD-SCNC: 98 MMOL/L (ref 99–110)
CO2: 29 MMOL/L (ref 21–32)
CREAT SERPL-MCNC: 0.7 MG/DL (ref 0.6–1.2)
EOSINOPHILS ABSOLUTE: 0.4 K/UL (ref 0–0.6)
EOSINOPHILS RELATIVE PERCENT: 4.3 %
GFR AFRICAN AMERICAN: >60
GFR NON-AFRICAN AMERICAN: >60
GLUCOSE BLD-MCNC: 94 MG/DL (ref 70–99)
HCT VFR BLD CALC: 32.6 % (ref 36–48)
HEMOGLOBIN: 11.1 G/DL (ref 12–16)
LACTIC ACID: 0.7 MMOL/L (ref 0.4–2)
LYMPHOCYTES ABSOLUTE: 1.8 K/UL (ref 1–5.1)
LYMPHOCYTES RELATIVE PERCENT: 20 %
MCH RBC QN AUTO: 29.1 PG (ref 26–34)
MCHC RBC AUTO-ENTMCNC: 33.9 G/DL (ref 31–36)
MCV RBC AUTO: 85.9 FL (ref 80–100)
MONOCYTES ABSOLUTE: 0.7 K/UL (ref 0–1.3)
MONOCYTES RELATIVE PERCENT: 7.2 %
NEUTROPHILS ABSOLUTE: 6.2 K/UL (ref 1.7–7.7)
NEUTROPHILS RELATIVE PERCENT: 68.1 %
PDW BLD-RTO: 15.1 % (ref 12.4–15.4)
PLATELET # BLD: 425 K/UL (ref 135–450)
PMV BLD AUTO: 6.5 FL (ref 5–10.5)
POTASSIUM REFLEX MAGNESIUM: 3.7 MMOL/L (ref 3.5–5.1)
RBC # BLD: 3.79 M/UL (ref 4–5.2)
SODIUM BLD-SCNC: 135 MMOL/L (ref 136–145)
TOTAL PROTEIN: 6.8 G/DL (ref 6.4–8.2)
WBC # BLD: 9.2 K/UL (ref 4–11)

## 2022-09-04 PROCEDURE — 73502 X-RAY EXAM HIP UNI 2-3 VIEWS: CPT

## 2022-09-04 PROCEDURE — 80053 COMPREHEN METABOLIC PANEL: CPT

## 2022-09-04 PROCEDURE — 83605 ASSAY OF LACTIC ACID: CPT

## 2022-09-04 PROCEDURE — 85025 COMPLETE CBC W/AUTO DIFF WBC: CPT

## 2022-09-04 PROCEDURE — 6370000000 HC RX 637 (ALT 250 FOR IP): Performed by: NURSE PRACTITIONER

## 2022-09-04 PROCEDURE — 99284 EMERGENCY DEPT VISIT MOD MDM: CPT | Performed by: NURSE PRACTITIONER

## 2022-09-04 RX ORDER — SULFAMETHOXAZOLE AND TRIMETHOPRIM 800; 160 MG/1; MG/1
1 TABLET ORAL 2 TIMES DAILY
Qty: 14 TABLET | Refills: 0 | Status: SHIPPED | OUTPATIENT
Start: 2022-09-04 | End: 2022-09-11

## 2022-09-04 RX ORDER — CEPHALEXIN 500 MG/1
500 CAPSULE ORAL ONCE
Status: COMPLETED | OUTPATIENT
Start: 2022-09-04 | End: 2022-09-04

## 2022-09-04 RX ORDER — CEPHALEXIN 500 MG/1
500 CAPSULE ORAL 2 TIMES DAILY
Qty: 14 CAPSULE | Refills: 0 | Status: SHIPPED | OUTPATIENT
Start: 2022-09-04 | End: 2022-09-11

## 2022-09-04 RX ORDER — SULFAMETHOXAZOLE AND TRIMETHOPRIM 800; 160 MG/1; MG/1
1 TABLET ORAL ONCE
Status: COMPLETED | OUTPATIENT
Start: 2022-09-04 | End: 2022-09-04

## 2022-09-04 RX ADMIN — CEPHALEXIN 500 MG: 500 CAPSULE ORAL at 10:35

## 2022-09-04 RX ADMIN — SULFAMETHOXAZOLE AND TRIMETHOPRIM 1 TABLET: 800; 160 TABLET ORAL at 10:35

## 2022-09-04 ASSESSMENT — PAIN - FUNCTIONAL ASSESSMENT: PAIN_FUNCTIONAL_ASSESSMENT: NONE - DENIES PAIN

## 2022-09-04 ASSESSMENT — PAIN SCALES - GENERAL: PAINLEVEL_OUTOF10: 0

## 2022-09-04 NOTE — ED PROVIDER NOTES
Lateral    [address]  [address] [address]  Dept: [phone]  Loc: [address] ENCOUNTER        This patient was not seen or evaluated by the attending physician. I evaluated this patient, the attending physician was available for consultation. CHIEF COMPLAINT    Chief Complaint   Patient presents with    Wound Infection     Nursing home reports surgical wound for right hip red, temp of 100F       HPI    Elizabeth Cervantes is a 82 y.o. female who presents for wound check. The wound is surgical incision over her lateral right hip status post she had a femoral nailing performed by Dr Burak Zamora on 8/24//2022. She was sent in from her residential [facility] care facility for redness around the surgical incision site and a fever of 100.0. The patient denies any pain, nausea, vomiting, fever, chills, sweats, diarrhea, urinary symptoms/retention, other concerns.       REVIEW OF SYSTEMS        PAST MEDICAL & SURGICAL HISTORY    Past Medical History:   Diagnosis Date    Advance directive discussed with patient     Asked to bring in:  tells me she is DNR    Chronic kidney disease (CKD) stage G3a/A1, moderately decreased glomerular filtration rate (GFR) between 45-59 mL/min/1.73 square meter and albuminuria creatinine ratio less than 30 mg/g ([facility])     Gout     Gout     HTN (hypertension)     Hypercholesteremia      Past Surgical History:   Procedure Laterality Date    HIP FRACTURE SURGERY Right 8/24/2022    RIGHT HIP FEMORAL NAILING performed by Trupti Simons MD at [facility] ([address] [place] [address])  Soha Jain  2005    Dr. Isha Pack  Dec 2012    Dr Del Daniel :  Right    [address]  (may include discharge medications prescribed in the ED)  Current Outpatient Rx   Medication Sig Dispense Refill    polyethylene glycol (GLYCOLAX) 17 g packet Take 17 g by mouth daily as needed for Constipation 527 g 1    aspirin 325 MG EC tablet Take 1 tablet by mouth daily 30 tablet 0    atorvastatin (LIPITOR) 40 MG tablet Take 40 mg by mouth daily      busPIRone (BUSPAR) 5 MG tablet Take 5 mg by mouth 2 times daily      melatonin 3 MG TABS tablet Take 6 mg by mouth nightly      donepezil (ARICEPT) 10 MG tablet Take 10 mg by mouth nightly      vitamin D (CHOLECALCIFEROL) 25 MCG (1000 UT) TABS tablet Take 1,000 Units by mouth daily      cyanocobalamin 1000 MCG tablet Take 1,000 mcg by mouth daily      amLODIPine (NORVASC) 10 MG tablet Take 10 mg by mouth daily         ALLERGIES    Allergies   Allergen Reactions    Morphine      Vomiting      Vicodin [Hydrocodone-Acetaminophen]      Vomiting/nausea    Meclizine      Makes her feels worse     Zetia [Ezetimibe] Itching    Lipitor [Atorvastatin] Other (See Comments)     myalgia       SOCIAL & FAMILY HISTORY    Social History     Socioeconomic History    Marital status: Single     Spouse name: Daughter Gurinder Demarco    Number of children: 3   Occupational History    Occupation: [employer]   Tobacco Use    Smoking status: Never    Smokeless tobacco: Never   Substance and Sexual Activity    Alcohol use: Yes     Comment: Wine nightly    Drug use: No    Sexual activity: Not Currently     Family History   Problem Relation Age of Onset    Cirrhosis Mother         Blood transfusion: Hep C    Diabetes Mother     Hypertension Mother     Emphysema Father                PHYSICAL EXAM    VITAL SIGNS: /69   Pulse 97   Temp 97.7 °F (36.5 °C) (Oral)   Resp 17   Wt 138 lb 14.2 oz (63 kg)   SpO2 97%   BMI 23.11 kg/m²   Constitutional:  Well developed, well nourished, no acute distress  HENT:  Atraumatic, no facial or lip swelling  Oral:  No tongue swelling, airway patent  Neck: Supple, no swelling  Respiratory:  No respiratory distress, breathing comfortably  Cardiovascular:  No JVD   Musculoskeletal: MCV 85.9 80.0 - 100.0 fL    MCH 29.1 26.0 - 34.0 pg    MCHC 33.9 31.0 - 36.0 g/dL    RDW 15.1 12.4 - 15.4 %    Platelets 785 148 - 734 K/uL    MPV 6.5 5.0 - 10.5 fL    Neutrophils % 68.1 %    Lymphocytes % 20.0 %    Monocytes % 7.2 %    Eosinophils % 4.3 %    Basophils % 0.4 %    Neutrophils Absolute 6.2 1.7 - 7.7 K/uL    Lymphocytes Absolute 1.8 1.0 - 5.1 K/uL    Monocytes Absolute 0.7 0.0 - 1.3 K/uL    Eosinophils Absolute 0.4 0.0 - 0.6 K/uL    Basophils Absolute 0.0 0.0 - 0.2 K/uL   Comprehensive Metabolic Panel w/ Reflex to MG   Result Value Ref Range    Sodium 135 (L) 136 - 145 mmol/L    Potassium reflex Magnesium 3.7 3.5 - 5.1 mmol/L    Chloride 98 (L) 99 - 110 mmol/L    CO2 29 21 - 32 mmol/L    Anion Gap 8 3 - 16    Glucose 94 70 - 99 mg/dL    BUN 18 7 - 20 mg/dL    Creatinine 0.7 0.6 - 1.2 mg/dL    GFR Non-African American >60 >60    GFR African American >60 >60    Calcium 9.3 8.3 - 10.6 mg/dL    Total Protein 6.8 6.4 - 8.2 g/dL    Albumin 3.2 (L) 3.4 - 5.0 g/dL    Albumin/Globulin Ratio 0.9 (L) 1.1 - 2.2    Total Bilirubin 1.7 (H) 0.0 - 1.0 mg/dL    Alkaline Phosphatase 186 (H) 40 - 129 U/L    ALT 19 10 - 40 U/L    AST 19 15 - 37 U/L   Lactic Acid   Result Value Ref Range    Lactic Acid 0.7 0.4 - 2.0 mmol/L     Imaging reviewed:  XR HIP RIGHT (2-3 VIEWS)    Result Date: 9/4/2022  EXAMINATION: TWO XRAY VIEWS OF THE RIGHT HIP 9/4/2022 8:49 am COMPARISON: None. HISTORY: ORDERING SYSTEM PROVIDED HISTORY: r/o abnormality TECHNOLOGIST PROVIDED HISTORY: Reason for exam:->r/o abnormality Reason for Exam: wound infection, recent hip surgery FINDINGS: Postop changes of right hip ORIF are noted. Hardware transfixes a healing intratrochanteric fracture. There is no acute fracture, osseous destruction or periosteal reaction. Surgical staples overlie the operative site. There is no soft tissue emphysema. No radiographic evidence of osteomyelitis.         Work-up reveals:  Metabolic panel, mild hyponatremia 135, hypochloremia 98, albumin reduced at 3.2, albumin/globulin ratio reduced at 0.9, total bilirubin elevated 1.7, alkaline phosphatase elevated 186    Lactic acid: [email or web address]  CBC: No leukocytosis with mild chronic anemia with an RBC of 3.79, and H&H 11.1 and 32.6 respectively, but otherwise unremarkable    XR right hip: As noted above identifying no radiographic evidence of osteomyelitis    4, have low suspicion of the presence of emergent medical condition at this time and feel that the risk of additional laboratory testing, imaging, and/or admission outweighs any potential benefit. However, patient was given strict return precautions. Patient verbalized understanding and is agreeable with the plan for discharge. Patient is afebrile and nontoxic in appearance. There is mild incision site erythema with well approximated wound edges devoid of purulent drainage. Patient is neurovascularly intact. Given normal physical exam findings and labs indicating no infection I feel that the patient both safe and appropriate discharged back to residential care facility. I will prescribe Bactrim and Keflex. Patient states she is having no pain. She will follow-up with her PCP and her general surgeon by calling on Tuesday, the day after the Labor Day holiday to schedule an appointment for reevaluation as I feel that the risk of additional laboratory testing, imaging, and/for admission outweighs any potential benefit at this time. Patient verbalized understanding is agreeable with plan for discharge and follow-up.         FINAL IMPRESSION    Infection following procedure, superficial incisional surgical site, initial encounter    PLAN  Discharge with outpatient follow-up      (Please note that this note was completed with a voice recognition program.  Every attempt was made to edit the dictations, but inevitably there remain words that are mis-transcribed.)          Rohan Cee, FLOR - CNP  09/04/22 [id number]

## 2022-09-04 NOTE — ED TRIAGE NOTES
Pt arrived from [facility] via EMS c/o elevated temperature and redness around recent surgical wound. Pt oriented to self and place at this time. Pt calm and cooperative. Hx dementia and hip fracture post fall. VS stable. Skin warm and dry. Fall risk screening completed. Fall risk bracelet applied to patient. Non-skid socks provided and placed on patient. The fall risk is indicated using  dome light . Based on score, a bed alarm was not indicated. The call light is within the patient's reach, and instructions for use were provided. The bed is in the lowest position with wheels locked. The patient has been advised to notify staff, using the call light, if there is a need to get up or use restroom. The patient verbalized understanding of safety precautions and how to contact staff for assistance.

## 2022-09-08 ENCOUNTER — TELEPHONE (OUTPATIENT)
Dept: ORTHOPEDIC SURGERY | Age: 87
End: 2022-09-08

## 2022-09-08 NOTE — TELEPHONE ENCOUNTER
I spoke with Evin Arzola and informed him that staples can come out and to apply steri strips. A post op apt was made.

## 2022-09-21 ENCOUNTER — OFFICE VISIT (OUTPATIENT)
Dept: ORTHOPEDIC SURGERY | Age: 87
End: 2022-09-21

## 2022-09-21 VITALS — BODY MASS INDEX: 22.99 KG/M2 | WEIGHT: 138 LBS | HEIGHT: 65 IN

## 2022-09-21 DIAGNOSIS — S72.144D CLOSED NONDISPLACED INTERTROCHANTERIC FRACTURE OF RIGHT FEMUR WITH ROUTINE HEALING, SUBSEQUENT ENCOUNTER: Primary | ICD-10-CM

## 2022-09-21 PROCEDURE — 99024 POSTOP FOLLOW-UP VISIT: CPT | Performed by: ORTHOPAEDIC SURGERY

## 2022-09-21 NOTE — PROGRESS NOTES
zee Nadine  [id number]  September 21, 2022    Chief Complaint   Patient presents with    Post-Op Check     RIGHT HIP Jones Mejia; DOS 8/24/22. History: The patient is an 82-year-old female who is here in follow-up regarding her right hip. She underwent a trochanteric femoral nailing on 8/24 for a comminuted intertrochanteric hip fracture. She reports no pain in the right hip. She is doing rather well. She is ambulating without much difficulty. She does reside at [address] [place] [address]. The patient's  past medical history, medications, allergies,  family history, social history, and have been reviewed, and dated and are recorded in the chart. Pertinent items are noted in HPI. Review of systems reviewed from Pertinent History Form dated on 9/21 and available in the patient's chart under the Media tab. Vitals:  Ht 5' 5\" (1.651 m)   Wt 138 lb (62.6 kg)   BMI 22.96 kg/m²     Physical: On examination today, the patient is alert. Her right hip incision is well-healed. There is no sign of infection. She is neurovascularly intact distally. There is no evidence of DVT. She has minimal to no pain with rotation of the right hip. X-rays: AP pelvis and 2 views of the right hip obtained in the office today were extensively reviewed. The hardware is in good position. The fracture is healing well. Impression: Status post right hip trochanteric femoral nailing    Plan: At this time, the patient will continue to work on balance, gait and general strengthening. The patient will follow up with me in 6 weeks and we will reassess her then. At follow-up, final x-rays of the right hip will be obtained.     Orders Placed This Encounter   Procedures    XR HIP 2-3 VW W PELVIS RIGHT     Rm 24     Standing Status:   Future     Number of Occurrences:   1     Standing Expiration Date:   10/21/2022     Order Specific Question:   Reason for exam:     Answer:   PO

## 2023-09-06 ENCOUNTER — APPOINTMENT (OUTPATIENT)
Dept: CT IMAGING | Age: 88
End: 2023-09-06
Payer: MEDICARE

## 2023-09-06 ENCOUNTER — HOSPITAL ENCOUNTER (EMERGENCY)
Age: 88
Discharge: HOME | End: 2023-09-06
Attending: EMERGENCY MEDICINE
Payer: MEDICARE

## 2023-09-06 ENCOUNTER — APPOINTMENT (OUTPATIENT)
Dept: GENERAL RADIOLOGY | Age: 88
End: 2023-09-06
Payer: MEDICARE

## 2023-09-06 VITALS
OXYGEN SATURATION: 100 % | WEIGHT: 124 LBS | RESPIRATION RATE: 18 BRPM | HEART RATE: 59 BPM | TEMPERATURE: 98 F | DIASTOLIC BLOOD PRESSURE: 68 MMHG | SYSTOLIC BLOOD PRESSURE: 125 MMHG | HEIGHT: 65 IN | BODY MASS INDEX: 20.66 KG/M2

## 2023-09-06 DIAGNOSIS — S01.81XA FACIAL LACERATION, INITIAL ENCOUNTER: Primary | ICD-10-CM

## 2023-09-06 PROCEDURE — 12013 RPR F/E/E/N/L/M 2.6-5.0 CM: CPT

## 2023-09-06 PROCEDURE — 73030 X-RAY EXAM OF SHOULDER: CPT

## 2023-09-06 PROCEDURE — 2500000003 HC RX 250 WO HCPCS: Performed by: EMERGENCY MEDICINE

## 2023-09-06 PROCEDURE — 72125 CT NECK SPINE W/O DYE: CPT

## 2023-09-06 PROCEDURE — 99284 EMERGENCY DEPT VISIT MOD MDM: CPT

## 2023-09-06 PROCEDURE — 70450 CT HEAD/BRAIN W/O DYE: CPT

## 2023-09-06 RX ORDER — LIDOCAINE HYDROCHLORIDE AND EPINEPHRINE 10; 10 MG/ML; UG/ML
20 INJECTION, SOLUTION INFILTRATION; PERINEURAL ONCE
Status: COMPLETED | OUTPATIENT
Start: 2023-09-06 | End: 2023-09-06

## 2023-09-06 RX ORDER — BACITRACIN ZINC AND POLYMYXIN B SULFATE 500; 1000 [USP'U]/G; [USP'U]/G
OINTMENT TOPICAL 2 TIMES DAILY
Status: CANCELLED | OUTPATIENT
Start: 2023-09-06

## 2023-09-06 RX ORDER — BACITRACIN ZINC AND POLYMYXIN B SULFATE 500; 1000 [USP'U]/G; [USP'U]/G
OINTMENT TOPICAL ONCE
Status: DISCONTINUED | OUTPATIENT
Start: 2023-09-06 | End: 2023-09-06 | Stop reason: HOSPADM

## 2023-09-06 RX ADMIN — LIDOCAINE HYDROCHLORIDE,EPINEPHRINE BITARTRATE 20 ML: 10; .01 INJECTION, SOLUTION INFILTRATION; PERINEURAL at 08:16

## 2023-09-06 ASSESSMENT — PAIN SCALES - PAIN ASSESSMENT IN ADVANCED DEMENTIA (PAINAD)
TOTALSCORE: 4
NEGVOCALIZATION: 1
BODYLANGUAGE: 1
BREATHING: 0
FACIALEXPRESSION: 1
CONSOLABILITY: 1

## 2023-09-06 ASSESSMENT — PAIN - FUNCTIONAL ASSESSMENT
PAIN_FUNCTIONAL_ASSESSMENT: PAIN ASSESSMENT IN ADVANCED DEMENTIA (PAINAD)
PAIN_FUNCTIONAL_ASSESSMENT: NONE - DENIES PAIN
PAIN_FUNCTIONAL_ASSESSMENT: ACTIVITIES ARE NOT PREVENTED

## 2023-09-06 ASSESSMENT — PAIN DESCRIPTION - LOCATION: LOCATION: HEAD

## 2023-09-06 ASSESSMENT — PAIN DESCRIPTION - PAIN TYPE: TYPE: ACUTE PAIN

## 2023-09-06 ASSESSMENT — PAIN DESCRIPTION - ONSET: ONSET: GRADUAL

## 2023-09-06 ASSESSMENT — PAIN DESCRIPTION - FREQUENCY: FREQUENCY: INTERMITTENT

## 2023-09-06 NOTE — ED TRIAGE NOTES
Patient not on blood thinners or aspirin , no neck pain, no vomiting, GCS 14/15 , known to have dementia.

## 2023-09-06 NOTE — ED NOTES
Report called to Sin Álvarez at [facility]. No questions at this time.  Transportation scheduled for 2401 Ronny Yarbrough RN  09/06/23 [id number]

## 2023-09-06 NOTE — DISCHARGE INSTRUCTIONS
Keep wound covered for 24 hours, then clean twice daily with warm, soapy water, dry completely, and apply antibiotic ointment. Suture removal of 4 sutures in 5 to 7 days. Tylenol or ibuprofen as needed for pain.

## 2023-11-05 ENCOUNTER — APPOINTMENT (OUTPATIENT)
Dept: GENERAL RADIOLOGY | Age: 88
DRG: 482 | End: 2023-11-05
Payer: MEDICARE

## 2023-11-05 ENCOUNTER — APPOINTMENT (OUTPATIENT)
Dept: CT IMAGING | Age: 88
DRG: 482 | End: 2023-11-05
Payer: MEDICARE

## 2023-11-05 ENCOUNTER — HOSPITAL ENCOUNTER (INPATIENT)
Age: 88
LOS: 2 days | Discharge: NURSING, REHAB OR LONG-TERM CARE | DRG: 482 | End: 2023-11-07
Attending: EMERGENCY MEDICINE | Admitting: INTERNAL MEDICINE
Payer: MEDICARE

## 2023-11-05 DIAGNOSIS — S72.142A CLOSED COMMINUTED INTERTROCHANTERIC FRACTURE OF PROXIMAL END OF LEFT FEMUR, INITIAL ENCOUNTER (HCC): ICD-10-CM

## 2023-11-05 DIAGNOSIS — Z79.899 CHRONICALLY ON BENZODIAZEPINE THERAPY: ICD-10-CM

## 2023-11-05 DIAGNOSIS — S72.142A CLOSED DISPLACED INTERTROCHANTERIC FRACTURE OF LEFT FEMUR, INITIAL ENCOUNTER (HCC): Primary | ICD-10-CM

## 2023-11-05 LAB
ANION GAP SERPL CALCULATED.3IONS-SCNC: 14 MMOL/L (ref 3–16)
BASOPHILS # BLD: 0 K/UL (ref 0–0.2)
BASOPHILS NFR BLD: 0.2 %
BUN SERPL-MCNC: 20 MG/DL (ref 7–20)
CALCIUM SERPL-MCNC: 9.6 MG/DL (ref 8.3–10.6)
CHLORIDE SERPL-SCNC: 105 MMOL/L (ref 99–110)
CO2 SERPL-SCNC: 26 MMOL/L (ref 21–32)
CREAT SERPL-MCNC: 1 MG/DL (ref 0.6–1.2)
DEPRECATED RDW RBC AUTO: 14.6 % (ref 12.4–15.4)
EOSINOPHIL # BLD: 0 K/UL (ref 0–0.6)
EOSINOPHIL NFR BLD: 0 %
GFR SERPLBLD CREATININE-BSD FMLA CKD-EPI: 55 ML/MIN/{1.73_M2}
GLUCOSE SERPL-MCNC: 130 MG/DL (ref 70–99)
HCT VFR BLD AUTO: 42.1 % (ref 36–48)
HGB BLD-MCNC: 14 G/DL (ref 12–16)
INR PPP: 1.04 (ref 0.84–1.16)
LYMPHOCYTES # BLD: 0.8 K/UL (ref 1–5.1)
LYMPHOCYTES NFR BLD: 6.5 %
MAGNESIUM SERPL-MCNC: 1.7 MG/DL (ref 1.8–2.4)
MCH RBC QN AUTO: 30.6 PG (ref 26–34)
MCHC RBC AUTO-ENTMCNC: 33.2 G/DL (ref 31–36)
MCV RBC AUTO: 92.1 FL (ref 80–100)
MONOCYTES # BLD: 0.8 K/UL (ref 0–1.3)
MONOCYTES NFR BLD: 6.6 %
NEUTROPHILS # BLD: 10.9 K/UL (ref 1.7–7.7)
NEUTROPHILS NFR BLD: 86.7 %
PLATELET # BLD AUTO: 241 K/UL (ref 135–450)
PMV BLD AUTO: 8.5 FL (ref 5–10.5)
POTASSIUM SERPL-SCNC: 3.4 MMOL/L (ref 3.5–5.1)
PROTHROMBIN TIME: 13.7 SEC (ref 11.5–14.8)
RBC # BLD AUTO: 4.57 M/UL (ref 4–5.2)
SODIUM SERPL-SCNC: 145 MMOL/L (ref 136–145)
WBC # BLD AUTO: 12.6 K/UL (ref 4–11)

## 2023-11-05 PROCEDURE — 80048 BASIC METABOLIC PNL TOTAL CA: CPT

## 2023-11-05 PROCEDURE — 1200000000 HC SEMI PRIVATE

## 2023-11-05 PROCEDURE — 70450 CT HEAD/BRAIN W/O DYE: CPT

## 2023-11-05 PROCEDURE — 73552 X-RAY EXAM OF FEMUR 2/>: CPT

## 2023-11-05 PROCEDURE — 72125 CT NECK SPINE W/O DYE: CPT

## 2023-11-05 PROCEDURE — 73521 X-RAY EXAM HIPS BI 2 VIEWS: CPT

## 2023-11-05 PROCEDURE — 36415 COLL VENOUS BLD VENIPUNCTURE: CPT

## 2023-11-05 PROCEDURE — 99285 EMERGENCY DEPT VISIT HI MDM: CPT

## 2023-11-05 PROCEDURE — 85025 COMPLETE CBC W/AUTO DIFF WBC: CPT

## 2023-11-05 PROCEDURE — 83735 ASSAY OF MAGNESIUM: CPT

## 2023-11-05 PROCEDURE — 73700 CT LOWER EXTREMITY W/O DYE: CPT

## 2023-11-05 PROCEDURE — 93005 ELECTROCARDIOGRAM TRACING: CPT | Performed by: EMERGENCY MEDICINE

## 2023-11-05 PROCEDURE — 85610 PROTHROMBIN TIME: CPT

## 2023-11-05 RX ORDER — OXYCODONE HYDROCHLORIDE 5 MG/1
5 TABLET ORAL EVERY 4 HOURS PRN
Status: DISCONTINUED | OUTPATIENT
Start: 2023-11-05 | End: 2023-11-07 | Stop reason: HOSPADM

## 2023-11-05 RX ORDER — ONDANSETRON 2 MG/ML
4 INJECTION INTRAMUSCULAR; INTRAVENOUS EVERY 6 HOURS PRN
Status: DISCONTINUED | OUTPATIENT
Start: 2023-11-05 | End: 2023-11-07 | Stop reason: HOSPADM

## 2023-11-05 RX ORDER — SODIUM CHLORIDE 9 MG/ML
INJECTION, SOLUTION INTRAVENOUS PRN
Status: DISCONTINUED | OUTPATIENT
Start: 2023-11-05 | End: 2023-11-06 | Stop reason: SDUPTHER

## 2023-11-05 RX ORDER — POTASSIUM CHLORIDE 7.45 MG/ML
10 INJECTION INTRAVENOUS PRN
Status: DISCONTINUED | OUTPATIENT
Start: 2023-11-05 | End: 2023-11-07 | Stop reason: HOSPADM

## 2023-11-05 RX ORDER — SODIUM CHLORIDE 0.9 % (FLUSH) 0.9 %
5-40 SYRINGE (ML) INJECTION EVERY 12 HOURS SCHEDULED
Status: DISCONTINUED | OUTPATIENT
Start: 2023-11-05 | End: 2023-11-07 | Stop reason: HOSPADM

## 2023-11-05 RX ORDER — POLYETHYLENE GLYCOL 3350 17 G/17G
17 POWDER, FOR SOLUTION ORAL DAILY PRN
Status: DISCONTINUED | OUTPATIENT
Start: 2023-11-05 | End: 2023-11-07 | Stop reason: HOSPADM

## 2023-11-05 RX ORDER — SODIUM CHLORIDE 0.9 % (FLUSH) 0.9 %
5-40 SYRINGE (ML) INJECTION PRN
Status: DISCONTINUED | OUTPATIENT
Start: 2023-11-05 | End: 2023-11-07 | Stop reason: HOSPADM

## 2023-11-05 RX ORDER — OXYCODONE HYDROCHLORIDE 5 MG/1
10 TABLET ORAL EVERY 4 HOURS PRN
Status: DISCONTINUED | OUTPATIENT
Start: 2023-11-05 | End: 2023-11-07 | Stop reason: HOSPADM

## 2023-11-05 RX ORDER — MAGNESIUM SULFATE IN WATER 40 MG/ML
2000 INJECTION, SOLUTION INTRAVENOUS PRN
Status: DISCONTINUED | OUTPATIENT
Start: 2023-11-05 | End: 2023-11-07 | Stop reason: HOSPADM

## 2023-11-05 RX ORDER — POTASSIUM CHLORIDE 20 MEQ/1
40 TABLET, EXTENDED RELEASE ORAL PRN
Status: DISCONTINUED | OUTPATIENT
Start: 2023-11-05 | End: 2023-11-07 | Stop reason: HOSPADM

## 2023-11-05 RX ORDER — ACETAMINOPHEN 500 MG
1000 TABLET ORAL EVERY 8 HOURS SCHEDULED
Status: DISCONTINUED | OUTPATIENT
Start: 2023-11-05 | End: 2023-11-07 | Stop reason: HOSPADM

## 2023-11-05 RX ORDER — SODIUM CHLORIDE 9 MG/ML
INJECTION, SOLUTION INTRAVENOUS CONTINUOUS
Status: DISCONTINUED | OUTPATIENT
Start: 2023-11-05 | End: 2023-11-07

## 2023-11-05 RX ORDER — METAXALONE 800 MG/1
800 TABLET ORAL EVERY 8 HOURS PRN
Status: DISCONTINUED | OUTPATIENT
Start: 2023-11-05 | End: 2023-11-07 | Stop reason: HOSPADM

## 2023-11-05 RX ORDER — ONDANSETRON 4 MG/1
4 TABLET, ORALLY DISINTEGRATING ORAL EVERY 8 HOURS PRN
Status: DISCONTINUED | OUTPATIENT
Start: 2023-11-05 | End: 2023-11-07 | Stop reason: HOSPADM

## 2023-11-06 ENCOUNTER — ANESTHESIA EVENT (OUTPATIENT)
Dept: OPERATING ROOM | Age: 88
DRG: 482 | End: 2023-11-06
Payer: MEDICARE

## 2023-11-06 ENCOUNTER — APPOINTMENT (OUTPATIENT)
Dept: GENERAL RADIOLOGY | Age: 88
DRG: 482 | End: 2023-11-06
Payer: MEDICARE

## 2023-11-06 ENCOUNTER — ANESTHESIA (OUTPATIENT)
Dept: OPERATING ROOM | Age: 88
DRG: 482 | End: 2023-11-06
Payer: MEDICARE

## 2023-11-06 PROBLEM — S72.142A CLOSED DISPLACED INTERTROCHANTERIC FRACTURE OF LEFT FEMUR (HCC): Status: ACTIVE | Noted: 2022-08-23

## 2023-11-06 LAB
ANION GAP SERPL CALCULATED.3IONS-SCNC: 10 MMOL/L (ref 3–16)
APTT BLD: 29.8 SEC (ref 22.7–35.9)
BASOPHILS # BLD: 0 K/UL (ref 0–0.2)
BASOPHILS NFR BLD: 0.2 %
BUN SERPL-MCNC: 18 MG/DL (ref 7–20)
CALCIUM SERPL-MCNC: 9 MG/DL (ref 8.3–10.6)
CHLORIDE SERPL-SCNC: 108 MMOL/L (ref 99–110)
CO2 SERPL-SCNC: 27 MMOL/L (ref 21–32)
CREAT SERPL-MCNC: 1 MG/DL (ref 0.6–1.2)
DEPRECATED RDW RBC AUTO: 14.7 % (ref 12.4–15.4)
EKG ATRIAL RATE: 105 BPM
EKG DIAGNOSIS: NORMAL
EKG P AXIS: 27 DEGREES
EKG P-R INTERVAL: 160 MS
EKG Q-T INTERVAL: 446 MS
EKG QRS DURATION: 90 MS
EKG QTC CALCULATION (BAZETT): 589 MS
EKG R AXIS: -20 DEGREES
EKG T AXIS: 56 DEGREES
EKG VENTRICULAR RATE: 105 BPM
EOSINOPHIL # BLD: 0 K/UL (ref 0–0.6)
EOSINOPHIL NFR BLD: 0 %
GFR SERPLBLD CREATININE-BSD FMLA CKD-EPI: 55 ML/MIN/{1.73_M2}
GLUCOSE SERPL-MCNC: 125 MG/DL (ref 70–99)
HCT VFR BLD AUTO: 37.8 % (ref 36–48)
HGB BLD-MCNC: 12.4 G/DL (ref 12–16)
LYMPHOCYTES # BLD: 1.6 K/UL (ref 1–5.1)
LYMPHOCYTES NFR BLD: 16.1 %
MAGNESIUM SERPL-MCNC: 1.6 MG/DL (ref 1.8–2.4)
MCH RBC QN AUTO: 30.3 PG (ref 26–34)
MCHC RBC AUTO-ENTMCNC: 32.9 G/DL (ref 31–36)
MCV RBC AUTO: 92.2 FL (ref 80–100)
MONOCYTES # BLD: 1 K/UL (ref 0–1.3)
MONOCYTES NFR BLD: 10.3 %
NEUTROPHILS # BLD: 7.4 K/UL (ref 1.7–7.7)
NEUTROPHILS NFR BLD: 73.4 %
NT-PROBNP SERPL-MCNC: 231 PG/ML (ref 0–449)
PLATELET # BLD AUTO: 221 K/UL (ref 135–450)
PMV BLD AUTO: 9.2 FL (ref 5–10.5)
POTASSIUM SERPL-SCNC: 3 MMOL/L (ref 3.5–5.1)
RBC # BLD AUTO: 4.1 M/UL (ref 4–5.2)
SODIUM SERPL-SCNC: 145 MMOL/L (ref 136–145)
TROPONIN, HIGH SENSITIVITY: 14 NG/L (ref 0–14)
WBC # BLD AUTO: 10.1 K/UL (ref 4–11)

## 2023-11-06 PROCEDURE — C1776 JOINT DEVICE (IMPLANTABLE): HCPCS | Performed by: ORTHOPAEDIC SURGERY

## 2023-11-06 PROCEDURE — 36415 COLL VENOUS BLD VENIPUNCTURE: CPT

## 2023-11-06 PROCEDURE — 85025 COMPLETE CBC W/AUTO DIFF WBC: CPT

## 2023-11-06 PROCEDURE — 94640 AIRWAY INHALATION TREATMENT: CPT

## 2023-11-06 PROCEDURE — C1713 ANCHOR/SCREW BN/BN,TIS/BN: HCPCS | Performed by: ORTHOPAEDIC SURGERY

## 2023-11-06 PROCEDURE — 1200000000 HC SEMI PRIVATE

## 2023-11-06 PROCEDURE — 85730 THROMBOPLASTIN TIME PARTIAL: CPT

## 2023-11-06 PROCEDURE — 3600000014 HC SURGERY LEVEL 4 ADDTL 15MIN: Performed by: ORTHOPAEDIC SURGERY

## 2023-11-06 PROCEDURE — 94761 N-INVAS EAR/PLS OXIMETRY MLT: CPT

## 2023-11-06 PROCEDURE — 99223 1ST HOSP IP/OBS HIGH 75: CPT | Performed by: ORTHOPAEDIC SURGERY

## 2023-11-06 PROCEDURE — 2580000003 HC RX 258: Performed by: INTERNAL MEDICINE

## 2023-11-06 PROCEDURE — 3700000000 HC ANESTHESIA ATTENDED CARE: Performed by: ORTHOPAEDIC SURGERY

## 2023-11-06 PROCEDURE — A4217 STERILE WATER/SALINE, 500 ML: HCPCS | Performed by: ORTHOPAEDIC SURGERY

## 2023-11-06 PROCEDURE — 6370000000 HC RX 637 (ALT 250 FOR IP): Performed by: INTERNAL MEDICINE

## 2023-11-06 PROCEDURE — 73502 X-RAY EXAM HIP UNI 2-3 VIEWS: CPT

## 2023-11-06 PROCEDURE — 83880 ASSAY OF NATRIURETIC PEPTIDE: CPT

## 2023-11-06 PROCEDURE — 2580000003 HC RX 258

## 2023-11-06 PROCEDURE — 2580000003 HC RX 258: Performed by: ORTHOPAEDIC SURGERY

## 2023-11-06 PROCEDURE — 2709999900 HC NON-CHARGEABLE SUPPLY: Performed by: ORTHOPAEDIC SURGERY

## 2023-11-06 PROCEDURE — 94150 VITAL CAPACITY TEST: CPT

## 2023-11-06 PROCEDURE — 3600000004 HC SURGERY LEVEL 4 BASE: Performed by: ORTHOPAEDIC SURGERY

## 2023-11-06 PROCEDURE — 6360000002 HC RX W HCPCS: Performed by: INTERNAL MEDICINE

## 2023-11-06 PROCEDURE — 6370000000 HC RX 637 (ALT 250 FOR IP): Performed by: ORTHOPAEDIC SURGERY

## 2023-11-06 PROCEDURE — 6360000002 HC RX W HCPCS: Performed by: ORTHOPAEDIC SURGERY

## 2023-11-06 PROCEDURE — 27245 TREAT THIGH FRACTURE: CPT | Performed by: ORTHOPAEDIC SURGERY

## 2023-11-06 PROCEDURE — 7100000001 HC PACU RECOVERY - ADDTL 15 MIN: Performed by: ORTHOPAEDIC SURGERY

## 2023-11-06 PROCEDURE — 80048 BASIC METABOLIC PNL TOTAL CA: CPT

## 2023-11-06 PROCEDURE — 6360000002 HC RX W HCPCS

## 2023-11-06 PROCEDURE — 0QS706Z REPOSITION LEFT UPPER FEMUR WITH INTRAMEDULLARY INTERNAL FIXATION DEVICE, OPEN APPROACH: ICD-10-PCS | Performed by: ORTHOPAEDIC SURGERY

## 2023-11-06 PROCEDURE — 2500000003 HC RX 250 WO HCPCS

## 2023-11-06 PROCEDURE — 3700000001 HC ADD 15 MINUTES (ANESTHESIA): Performed by: ORTHOPAEDIC SURGERY

## 2023-11-06 PROCEDURE — 84484 ASSAY OF TROPONIN QUANT: CPT

## 2023-11-06 PROCEDURE — 2720000010 HC SURG SUPPLY STERILE: Performed by: ORTHOPAEDIC SURGERY

## 2023-11-06 PROCEDURE — 83735 ASSAY OF MAGNESIUM: CPT

## 2023-11-06 PROCEDURE — 7100000000 HC PACU RECOVERY - FIRST 15 MIN: Performed by: ORTHOPAEDIC SURGERY

## 2023-11-06 DEVICE — LOCKING SCREW
Type: IMPLANTABLE DEVICE | Site: HIP | Status: FUNCTIONAL
Brand: T2 ALPHA

## 2023-11-06 DEVICE — LONG NAIL, LEFT
Type: IMPLANTABLE DEVICE | Site: HIP | Status: FUNCTIONAL
Brand: GAMMA

## 2023-11-06 DEVICE — LAG SCREW
Type: IMPLANTABLE DEVICE | Site: HIP | Status: FUNCTIONAL
Brand: GAMMA

## 2023-11-06 RX ORDER — BUSPIRONE HYDROCHLORIDE 5 MG/1
5 TABLET ORAL 2 TIMES DAILY
Status: DISCONTINUED | OUTPATIENT
Start: 2023-11-06 | End: 2023-11-07 | Stop reason: HOSPADM

## 2023-11-06 RX ORDER — SODIUM CHLORIDE 9 MG/ML
INJECTION, SOLUTION INTRAVENOUS PRN
Status: DISCONTINUED | OUTPATIENT
Start: 2023-11-06 | End: 2023-11-06 | Stop reason: HOSPADM

## 2023-11-06 RX ORDER — LABETALOL HYDROCHLORIDE 5 MG/ML
10 INJECTION, SOLUTION INTRAVENOUS
Status: DISCONTINUED | OUTPATIENT
Start: 2023-11-06 | End: 2023-11-06 | Stop reason: HOSPADM

## 2023-11-06 RX ORDER — SODIUM CHLORIDE 0.9 % (FLUSH) 0.9 %
5-40 SYRINGE (ML) INJECTION EVERY 12 HOURS SCHEDULED
Status: DISCONTINUED | OUTPATIENT
Start: 2023-11-06 | End: 2023-11-06 | Stop reason: HOSPADM

## 2023-11-06 RX ORDER — SODIUM CHLORIDE 0.9 % (FLUSH) 0.9 %
5-40 SYRINGE (ML) INJECTION EVERY 12 HOURS SCHEDULED
Status: DISCONTINUED | OUTPATIENT
Start: 2023-11-06 | End: 2023-11-07 | Stop reason: HOSPADM

## 2023-11-06 RX ORDER — SUCCINYLCHOLINE/SOD CL,ISO/PF 200MG/10ML
SYRINGE (ML) INTRAVENOUS PRN
Status: DISCONTINUED | OUTPATIENT
Start: 2023-11-06 | End: 2023-11-06 | Stop reason: SDUPTHER

## 2023-11-06 RX ORDER — FENTANYL CITRATE 50 UG/ML
INJECTION, SOLUTION INTRAMUSCULAR; INTRAVENOUS PRN
Status: DISCONTINUED | OUTPATIENT
Start: 2023-11-06 | End: 2023-11-06 | Stop reason: SDUPTHER

## 2023-11-06 RX ORDER — ENOXAPARIN SODIUM 100 MG/ML
40 INJECTION SUBCUTANEOUS DAILY
Status: DISCONTINUED | OUTPATIENT
Start: 2023-11-06 | End: 2023-11-06 | Stop reason: DRUGHIGH

## 2023-11-06 RX ORDER — PHENYLEPHRINE HYDROCHLORIDE 10 MG/ML
INJECTION INTRAVENOUS PRN
Status: DISCONTINUED | OUTPATIENT
Start: 2023-11-06 | End: 2023-11-06 | Stop reason: SDUPTHER

## 2023-11-06 RX ORDER — GLYCOPYRROLATE 0.2 MG/ML
INJECTION INTRAMUSCULAR; INTRAVENOUS PRN
Status: DISCONTINUED | OUTPATIENT
Start: 2023-11-06 | End: 2023-11-06 | Stop reason: SDUPTHER

## 2023-11-06 RX ORDER — DEXAMETHASONE SODIUM PHOSPHATE 4 MG/ML
INJECTION, SOLUTION INTRA-ARTICULAR; INTRALESIONAL; INTRAMUSCULAR; INTRAVENOUS; SOFT TISSUE PRN
Status: DISCONTINUED | OUTPATIENT
Start: 2023-11-06 | End: 2023-11-06 | Stop reason: SDUPTHER

## 2023-11-06 RX ORDER — ASPIRIN 81 MG/1
81 TABLET, CHEWABLE ORAL DAILY
Status: DISCONTINUED | OUTPATIENT
Start: 2023-11-06 | End: 2023-11-07 | Stop reason: HOSPADM

## 2023-11-06 RX ORDER — FENTANYL CITRATE 50 UG/ML
25 INJECTION, SOLUTION INTRAMUSCULAR; INTRAVENOUS EVERY 5 MIN PRN
Status: DISCONTINUED | OUTPATIENT
Start: 2023-11-06 | End: 2023-11-06 | Stop reason: HOSPADM

## 2023-11-06 RX ORDER — CEFAZOLIN SODIUM 1 G/3ML
INJECTION, POWDER, FOR SOLUTION INTRAMUSCULAR; INTRAVENOUS PRN
Status: DISCONTINUED | OUTPATIENT
Start: 2023-11-06 | End: 2023-11-06 | Stop reason: SDUPTHER

## 2023-11-06 RX ORDER — IPRATROPIUM BROMIDE AND ALBUTEROL SULFATE 2.5; .5 MG/3ML; MG/3ML
1 SOLUTION RESPIRATORY (INHALATION)
Status: DISCONTINUED | OUTPATIENT
Start: 2023-11-06 | End: 2023-11-06 | Stop reason: HOSPADM

## 2023-11-06 RX ORDER — SODIUM CHLORIDE 9 MG/ML
INJECTION, SOLUTION INTRAVENOUS PRN
Status: DISCONTINUED | OUTPATIENT
Start: 2023-11-06 | End: 2023-11-07 | Stop reason: HOSPADM

## 2023-11-06 RX ORDER — MAGNESIUM HYDROXIDE 1200 MG/15ML
LIQUID ORAL CONTINUOUS PRN
Status: DISCONTINUED | OUTPATIENT
Start: 2023-11-06 | End: 2023-11-06 | Stop reason: HOSPADM

## 2023-11-06 RX ORDER — ROCURONIUM BROMIDE 10 MG/ML
INJECTION, SOLUTION INTRAVENOUS PRN
Status: DISCONTINUED | OUTPATIENT
Start: 2023-11-06 | End: 2023-11-06 | Stop reason: SDUPTHER

## 2023-11-06 RX ORDER — SODIUM CHLORIDE 450 MG/100ML
INJECTION, SOLUTION INTRAVENOUS CONTINUOUS
Status: DISCONTINUED | OUTPATIENT
Start: 2023-11-06 | End: 2023-11-07

## 2023-11-06 RX ORDER — PROCHLORPERAZINE EDISYLATE 5 MG/ML
5 INJECTION INTRAMUSCULAR; INTRAVENOUS
Status: DISCONTINUED | OUTPATIENT
Start: 2023-11-06 | End: 2023-11-06 | Stop reason: HOSPADM

## 2023-11-06 RX ORDER — LIDOCAINE HYDROCHLORIDE 20 MG/ML
INJECTION, SOLUTION INTRAVENOUS PRN
Status: DISCONTINUED | OUTPATIENT
Start: 2023-11-06 | End: 2023-11-06 | Stop reason: SDUPTHER

## 2023-11-06 RX ORDER — THIAMINE HYDROCHLORIDE 100 MG/ML
100 INJECTION, SOLUTION INTRAMUSCULAR; INTRAVENOUS DAILY
Status: DISCONTINUED | OUTPATIENT
Start: 2023-11-06 | End: 2023-11-07 | Stop reason: HOSPADM

## 2023-11-06 RX ORDER — PROPOFOL 10 MG/ML
INJECTION, EMULSION INTRAVENOUS PRN
Status: DISCONTINUED | OUTPATIENT
Start: 2023-11-06 | End: 2023-11-06 | Stop reason: SDUPTHER

## 2023-11-06 RX ORDER — ONDANSETRON 2 MG/ML
INJECTION INTRAMUSCULAR; INTRAVENOUS PRN
Status: DISCONTINUED | OUTPATIENT
Start: 2023-11-06 | End: 2023-11-06 | Stop reason: SDUPTHER

## 2023-11-06 RX ORDER — LANOLIN ALCOHOL/MO/W.PET/CERES
3 CREAM (GRAM) TOPICAL NIGHTLY
Status: DISCONTINUED | OUTPATIENT
Start: 2023-11-06 | End: 2023-11-07 | Stop reason: HOSPADM

## 2023-11-06 RX ORDER — SODIUM CHLORIDE 0.9 % (FLUSH) 0.9 %
5-40 SYRINGE (ML) INJECTION PRN
Status: DISCONTINUED | OUTPATIENT
Start: 2023-11-06 | End: 2023-11-07 | Stop reason: HOSPADM

## 2023-11-06 RX ORDER — ENOXAPARIN SODIUM 100 MG/ML
30 INJECTION SUBCUTANEOUS DAILY
Status: DISCONTINUED | OUTPATIENT
Start: 2023-11-06 | End: 2023-11-07 | Stop reason: HOSPADM

## 2023-11-06 RX ORDER — ONDANSETRON 2 MG/ML
4 INJECTION INTRAMUSCULAR; INTRAVENOUS
Status: DISCONTINUED | OUTPATIENT
Start: 2023-11-06 | End: 2023-11-06 | Stop reason: HOSPADM

## 2023-11-06 RX ORDER — SODIUM CHLORIDE 0.9 % (FLUSH) 0.9 %
5-40 SYRINGE (ML) INJECTION PRN
Status: DISCONTINUED | OUTPATIENT
Start: 2023-11-06 | End: 2023-11-06 | Stop reason: HOSPADM

## 2023-11-06 RX ORDER — DONEPEZIL HYDROCHLORIDE 10 MG/1
10 TABLET, FILM COATED ORAL NIGHTLY
Status: DISCONTINUED | OUTPATIENT
Start: 2023-11-06 | End: 2023-11-07 | Stop reason: HOSPADM

## 2023-11-06 RX ADMIN — SUGAMMADEX 200 MG: 100 INJECTION, SOLUTION INTRAVENOUS at 17:32

## 2023-11-06 RX ADMIN — ONDANSETRON 4 MG: 2 INJECTION INTRAMUSCULAR; INTRAVENOUS at 16:53

## 2023-11-06 RX ADMIN — OXYCODONE 10 MG: 5 TABLET ORAL at 19:53

## 2023-11-06 RX ADMIN — PROPOFOL 80 MG: 10 INJECTION, EMULSION INTRAVENOUS at 16:50

## 2023-11-06 RX ADMIN — ENOXAPARIN SODIUM 30 MG: 100 INJECTION SUBCUTANEOUS at 19:53

## 2023-11-06 RX ADMIN — ACETAMINOPHEN 1000 MG: 500 TABLET ORAL at 20:01

## 2023-11-06 RX ADMIN — OXYCODONE 10 MG: 5 TABLET ORAL at 08:35

## 2023-11-06 RX ADMIN — PHENYLEPHRINE HYDROCHLORIDE 150 MCG: 10 INJECTION INTRAVENOUS at 17:00

## 2023-11-06 RX ADMIN — DONEPEZIL HYDROCHLORIDE 10 MG: 10 TABLET ORAL at 19:53

## 2023-11-06 RX ADMIN — GLYCOPYRROLATE 0.2 MG: 0.2 INJECTION INTRAMUSCULAR; INTRAVENOUS at 16:50

## 2023-11-06 RX ADMIN — FENTANYL CITRATE 50 MCG: 50 INJECTION, SOLUTION INTRAMUSCULAR; INTRAVENOUS at 17:12

## 2023-11-06 RX ADMIN — BUSPIRONE HYDROCHLORIDE 5 MG: 5 TABLET ORAL at 19:54

## 2023-11-06 RX ADMIN — FENTANYL CITRATE 50 MCG: 50 INJECTION, SOLUTION INTRAMUSCULAR; INTRAVENOUS at 16:50

## 2023-11-06 RX ADMIN — ROCURONIUM BROMIDE 5 MG: 10 INJECTION, SOLUTION INTRAVENOUS at 16:50

## 2023-11-06 RX ADMIN — PHENYLEPHRINE HYDROCHLORIDE 300 MCG: 10 INJECTION INTRAVENOUS at 17:20

## 2023-11-06 RX ADMIN — PHENYLEPHRINE HYDROCHLORIDE 300 MCG: 10 INJECTION INTRAVENOUS at 17:03

## 2023-11-06 RX ADMIN — CEFAZOLIN SODIUM 2 G: 1 POWDER, FOR SOLUTION INTRAMUSCULAR; INTRAVENOUS at 16:56

## 2023-11-06 RX ADMIN — THIAMINE HYDROCHLORIDE 100 MG: 100 INJECTION, SOLUTION INTRAMUSCULAR; INTRAVENOUS at 19:53

## 2023-11-06 RX ADMIN — DEXAMETHASONE SODIUM PHOSPHATE 4 MG: 4 INJECTION, SOLUTION INTRAMUSCULAR; INTRAVENOUS at 16:53

## 2023-11-06 RX ADMIN — OXYCODONE 10 MG: 5 TABLET ORAL at 04:35

## 2023-11-06 RX ADMIN — TRANEXAMIC ACID 1000 MG: 100 INJECTION, SOLUTION INTRAVENOUS at 17:02

## 2023-11-06 RX ADMIN — Medication 80 MG: at 16:50

## 2023-11-06 RX ADMIN — OXYCODONE 10 MG: 5 TABLET ORAL at 00:53

## 2023-11-06 RX ADMIN — ASPIRIN 81 MG: 81 TABLET, CHEWABLE ORAL at 19:53

## 2023-11-06 RX ADMIN — ACETAMINOPHEN 1000 MG: 500 TABLET ORAL at 15:00

## 2023-11-06 RX ADMIN — ROCURONIUM BROMIDE 45 MG: 10 INJECTION, SOLUTION INTRAVENOUS at 16:58

## 2023-11-06 RX ADMIN — SODIUM CHLORIDE: 9 INJECTION, SOLUTION INTRAVENOUS at 00:52

## 2023-11-06 RX ADMIN — LIDOCAINE HYDROCHLORIDE 50 MG: 20 INJECTION, SOLUTION INTRAVENOUS at 16:50

## 2023-11-06 RX ADMIN — ACETAMINOPHEN 1000 MG: 500 TABLET ORAL at 04:35

## 2023-11-06 RX ADMIN — ACETAMINOPHEN 1000 MG: 500 TABLET ORAL at 00:53

## 2023-11-06 ASSESSMENT — PAIN SCALES - PAIN ASSESSMENT IN ADVANCED DEMENTIA (PAINAD)
CONSOLABILITY: NO NEED TO CONSOLE
TOTALSCORE: 3
BREATHING: 0
FACIALEXPRESSION: SMILING OR INEXPRESSIVE
CONSOLABILITY: DISTRACTED OR REASSURED BY VOICE/TOUCH
BODYLANGUAGE: 2
CONSOLABILITY: DISTRACTED OR REASSURED BY VOICE/TOUCH
CONSOLABILITY: 1
NEGVOCALIZATION: OCCASIONAL MOAN/GROAN, LOW SPEECH, NEGATIVE/DISAPPROVING QUALITY
FACIALEXPRESSION: 1
NEGVOCALIZATION: REPEATED TROUBLED CALLING OUT, LOUD MOANING/GROANING, CRYING
BODYLANGUAGE: 1
BREATHING: 2
BODYLANGUAGE: RELAXED
BREATHING: NOISY LABORED BREATHING, LONG PERIODS HYPERVENTILATION, CHEYNE-STOKES RESPIRATIONS
BODYLANGUAGE: RIGID, FISTS CLENCHED, KNEES UP, PUSHING/PULLING AWAY, STRIKES OUT
NEGVOCALIZATION: 1
CONSOLABILITY: DISTRACTED OR REASSURED BY VOICE/TOUCH
CONSOLABILITY: NO NEED TO CONSOLE
FACIALEXPRESSION: 1
BREATHING: 2
BODYLANGUAGE: TENSE, DISTRESSED PACING, FIDGETING
FACIALEXPRESSION: SAD, FRIGHTENED, FROWN
BREATHING: NOISY LABORED BREATHING, LONG PERIODS HYPERVENTILATION, CHEYNE-STOKES RESPIRATIONS
BREATHING: NOISY LABORED BREATHING, LONG PERIODS HYPERVENTILATION, CHEYNE-STOKES RESPIRATIONS
FACIALEXPRESSION: SAD, FRIGHTENED, FROWN
FACIALEXPRESSION: 1
TOTALSCORE: 8
BODYLANGUAGE: 2
FACIALEXPRESSION: 0
NEGVOCALIZATION: REPEATED TROUBLED CALLING OUT, LOUD MOANING/GROANING, CRYING
TOTALSCORE: 1
BREATHING: NORMAL
TOTALSCORE: 8
BODYLANGUAGE: 0
NEGVOCALIZATION: 2
BREATHING: 1
FACIALEXPRESSION: SAD, FRIGHTENED, FROWN
NEGVOCALIZATION: OCCASIONAL MOAN/GROAN, LOW SPEECH, NEGATIVE/DISAPPROVING QUALITY
NEGVOCALIZATION: REPEATED TROUBLED CALLING OUT, LOUD MOANING/GROANING, CRYING
TOTALSCORE: 8
BREATHING: 2
BREATHING: OCCASIONAL LABORED BREATHING, SHORT PERIOD OF HYPERVENTILATION
BODYLANGUAGE: 2
BODYLANGUAGE: RIGID, FISTS CLENCHED, KNEES UP, PUSHING/PULLING AWAY, STRIKES OUT
BODYLANGUAGE: RIGID, FISTS CLENCHED, KNEES UP, PUSHING/PULLING AWAY, STRIKES OUT
CONSOLABILITY: 0
CONSOLABILITY: 1
CONSOLABILITY: 0
FACIALEXPRESSION: 0
NEGVOCALIZATION: 2
NEGVOCALIZATION: 2
NEGVOCALIZATION: 1
CONSOLABILITY: 1
FACIALEXPRESSION: SMILING OR INEXPRESSIVE

## 2023-11-06 ASSESSMENT — PAIN DESCRIPTION - LOCATION
LOCATION: HIP;LEG
LOCATION: HIP

## 2023-11-06 ASSESSMENT — PAIN SCALES - WONG BAKER
WONGBAKER_NUMERICALRESPONSE: 4
WONGBAKER_NUMERICALRESPONSE: HURTS LITTLE MORE

## 2023-11-06 ASSESSMENT — PAIN - FUNCTIONAL ASSESSMENT: PAIN_FUNCTIONAL_ASSESSMENT: PREVENTS OR INTERFERES WITH MANY ACTIVE NOT PASSIVE ACTIVITIES

## 2023-11-06 ASSESSMENT — PAIN DESCRIPTION - ORIENTATION
ORIENTATION: LEFT
ORIENTATION: LEFT

## 2023-11-06 ASSESSMENT — PAIN DESCRIPTION - DESCRIPTORS: DESCRIPTORS: PATIENT UNABLE TO DESCRIBE

## 2023-11-06 ASSESSMENT — PAIN SCALES - GENERAL
PAINLEVEL_OUTOF10: 0
PAINLEVEL_OUTOF10: 0

## 2023-11-06 ASSESSMENT — PAIN DESCRIPTION - PAIN TYPE: TYPE: ACUTE PAIN

## 2023-11-06 NOTE — ED NOTES
ED TO INPATIENT SBAR HANDOFF    Patient Name: Magen Tapia   :  1935  82 y.o. MRN:  [id number]  Preferred Name  Екатерина Quinteros  ED Room #:  M37/H41-13  Family/Caregiver Present no   Restraints no   Sitter no   Sepsis Risk Score Sepsis Risk Score: 1.35    Situation  Code Status: Full Code No additional code details. Allergies: Morphine, Vicodin [hydrocodone-acetaminophen], Meclizine, Zetia [ezetimibe], and Lipitor [atorvastatin]  Weight: Patient Vitals for the past 96 hrs (Last 3 readings):   Weight   23 2144 54.3 kg (119 lb 12.8 oz)     Arrived from: nursing home  Chief Complaint:   Chief Complaint   Patient presents with    Sheldon Cowboy fall at 31 75 62. C/o L hip pain. Is able to move all extremities. Hx dementia and is at baseline mentation      Hospital Problem/Diagnosis:  Principal Problem:    Closed comminuted intertrochanteric fracture of proximal end of left femur, initial encounter ([address])  Active Problems:    Essential hypertension, benign    CKD (chronic kidney disease) stage 3, GFR 30-59 ml/min ([facility])    Hyperuricemia  Resolved Problems:    * No resolved hospital problems. *    Imaging:   CT HIP LEFT WO CONTRAST   Final Result   Comminuted, angulated intertrochanteric fracture of the left femur. Electronically signed by Mary Klein      CT Head W/O Contrast   Final Result      No acute intracranial process. Age-appropriate global atrophy with stable extensive chronic small vessel ischemic changes. Electronically signed by Alexandra Ugalde MD      CT C-Spine W/O Contrast   Final Result      No acute injury. Electronically signed by Alexandra Ugalde MD      XR FEMUR LEFT (MIN 2 VIEWS)   Final Result      Fracture of the proximal femur. Electronically signed by Alexandra Ugalde MD      XR HIP BILATERAL W AP PELVIS (2 VIEWS)   Final Result      Mildly angulated intertrochanteric fracture of the proximal left femur.       Electronically signed by Alexandra Ugalde MD Medications/Drips: no   If Yes, please provide details:   Pending Blood Product Administration: no       You may also review the ED PT Care Timeline found under the Summary Nursing Index tab. Recommendation    Pending orders ED orders completed  Plan for Discharge (if known): Additional Comments: Pt with dementia still follow commands but confused.     If any further questions, please call Sending RN at [phone]    Electronically signed by: Electronically signed by Fortunato Wilson RN on 11/5/2023 at 11:21 PM      Fortunato Wilson RN  11/05/23 9404

## 2023-11-06 NOTE — PROGRESS NOTES
Pt arrives from OR with oral airway and 3l pm NC on bed after having L Hip surgery. Report from CRNA and OR RN states surgery was uneventful and there were no problems noted.    Pt shows no signs or symptoms of pain or distress LEFT HIP GAMMA NAILING - Left  Yung Medina MD

## 2023-11-06 NOTE — PLAN OF CARE
Problem: Pain  Goal: Verbalizes/displays adequate comfort level or baseline comfort level  Flowsheets (Taken 11/6/2023 2885)  Verbalizes/displays adequate comfort level or baseline comfort level: Administer analgesics based on type and severity of pain and evaluate response

## 2023-11-06 NOTE — ANESTHESIA POSTPROCEDURE EVALUATION
Department of Anesthesiology  Postprocedure Note    Patient: Fredy Osorio  MRN: [id number]  YOB: 1935  Date of evaluation: 11/6/2023      Procedure Summary     Date: 11/06/23 Room / Location: [facility] 09 / The [address] [facility]    Anesthesia Start: 1646 Anesthesia Stop: 0837    Procedure: LEFT HIP GAMMA NAILING (Left: Hip) Diagnosis:       Type I or II open fracture of left hip, initial encounter ([address])      (Type I or II open fracture of left hip, initial encounter ([address]) [S72.002B])    Surgeons: Mallika Weinberg MD Responsible Provider: Barbara Sanchez MD    Anesthesia Type: general ASA Status: 3          Anesthesia Type: No value filed.     Sandee Phase I: Sandee Score: 8    Sandee Phase II:        Anesthesia Post Evaluation    Patient location during evaluation: PACU  Patient participation: complete - patient participated  Level of consciousness: awake  Pain score: 0  Nausea & Vomiting: no nausea and no vomiting  Complications: no  Cardiovascular status: blood pressure returned to baseline  Respiratory status: acceptable  Hydration status: euvolemic  Pain management: adequate

## 2023-11-06 NOTE — PROGRESS NOTES
Patient admitted to room 5305 from ED. Patient is A&O x 1. VSS. Patient oriented to the room all safety measures in place. Patient given IS and SCDs at this time. Admission orders released and patient 4 eyes completed. Admission documentation completed. No other needs are noted at this time.     [x] Bed alarm on and cord plugged into wall  [x] Bed in lowest position  [x] Call light and bedside table within reach  [x] Patient educated on all safety measures  []Oxygen connected to wall (if applicable)     Nurse 1 Esignature: Electronically signed by Mani Su RN on 11/6/23 at 2:46 AM EST  Nurse 2 Esignature: Electronically signed by Terence Rose RN on 11/6/23 at 4:02 AM EST

## 2023-11-06 NOTE — ED PROVIDER NOTES
ED Attending Attestation Note     Date of evaluation: 11/5/2023    This patient was seen by the resident. I have seen and examined the patient, agree with the workup, evaluation, management and diagnosis. The care plan has been discussed. My assessment reveals 63-year-old female who presents with a chief complaint of fall. Patient had an unwitnessed fall around 6:30 PM.  Complaining of left hip pain. Otherwise trauma exam unremarkable. On movement of the left hip patient screams in pain. No obvious deformities or shortening. EKG: Indication: Preop. Heart rate 105, intervals normal for questionable QTc prolongation, but there is significant artifact, unclear if really not. T waves somewhat flattened throughout, difficult to appreciate where the T waves are. Axis normal.  No findings of ST elevation or depression, no T wave inversions. Comparison to prior EKG with no changes.   Impression: Sinus tachycardia with no active ischemia     Yusef Hendrikcs MD  11/05/23 6295       Yusef Hendricks MD  11/05/23 5881

## 2023-11-06 NOTE — PROGRESS NOTES
Pharmacist Review and Automatic Dose Adjustment of Prophylactic Enoxaparin         The reviewing pharmacist has made an adjustment to the ordered enoxaparin dose or converted to UFH per the approved [facility] protocol and table as identified below. Manuel Elmore is a 82 y.o. female. Recent Labs     11/05/23  2316 11/06/23  0538   CREATININE 1.0 1.0       Estimated Creatinine Clearance: 29 mL/min (based on SCr of 1 mg/dL).     Recent Labs     11/05/23 2316 11/06/23  0538   HGB 14.0 12.4   HCT 42.1 37.8    221     Recent Labs     11/05/23 2316   INR 1.04       Height:   Ht Readings from Last 1 Encounters:   11/05/23 1.524 m (5')     Weight:  Wt Readings from Last 1 Encounters:   11/05/23 54.3 kg (119 lb 12.8 oz)               Plan: Based upon the patient's weight and renal function    Ordered: Enoxaparin 40mg SUBQ Daily    Changed/converted to    New Order: Enoxaparin 30mg SUBQ Daily      Thank you,  [address] Fernandes [facility]  11/6/2023, 6:54 PM

## 2023-11-06 NOTE — PROGRESS NOTES
Patient alert and oriented x1, to self only. VSS Patient with extreme pain in L hip, dementia pain scale used- medicated per MAR. Purewick in place. Swallowing with no issues. NSR on tele. Camera in place for safety. Patient denies any other needs at this time. Bed is in the lowest position, call light and bedside table within reach. Patient bed alarm is on.     Electronically signed by Shukri Raygoza RN on 11/6/2023 at 6:55 AM

## 2023-11-06 NOTE — FLOWSHEET NOTE
Face to face report given with Receiving RN. Oral temp obtained for 97.4. Both RN's ok with reading.

## 2023-11-06 NOTE — PROGRESS NOTES
4 Eyes Skin Assessment     NAME:  Tylor Booth  YOB: 1935  MEDICAL RECORD NUMBER:  [id number]    The patient is being assessed for  Admission    I agree that at least one RN has performed a thorough Head to Toe Skin Assessment on the patient. ALL assessment sites listed below have been assessed. Areas assessed by both nurses:    Head, Face, Ears, Shoulders, Back, Chest, Arms, Elbows, Hands, Sacrum. Buttock, Coccyx, Ischium, Legs. Feet and Heels, and Under Medical Devices         Does the Patient have a Wound?  No noted wound(s)       Donald Prevention initiated by RN: Yes  Wound Care Orders initiated by RN: No    Pressure Injury (Stage 3,4, Unstageable, DTI, NWPT, and Complex wounds) if present, place Wound referral order by RN under : No    New Ostomies, if present place, Ostomy referral order under : No     Nurse 1 eSignature: Electronically signed by Lauren Sheth RN on 11/6/23 at 2:45 AM EST    **SHARE this note so that the co-signing nurse can place an eSignature**    Nurse 2 eSignature: Electronically signed by Noris Sanches RN on 11/6/23 at 4:02 AM EST

## 2023-11-06 NOTE — H&P
V2.0  History and Physical      Name:  Steven Whiteside /Age/Sex: 1935  (82 y.o. female)   MRN & CSN:  [id number] & [id number] Encounter Date/Time: 2023 11:18 PM EST   [facility]CP: Cy Escalera Day: 1    Assessment and Plan: Steven Whiteside is a 82 y.o. female poor historian with a pmh of dementia gout, right hip fracture status post pinning a year ago who presents with Closed comminuted intertrochanteric fracture of proximal end of left femur, initial encounter ([address]) after an unwitnessed fall. Hospital Problems             Last Modified POA    * (Principal) Closed intertrochanteric fracture of proximal end of left femur, initial encounter ([address]) 2023 Yes    Essential hypertension, benign 2023 Yes    Overview Signed 2019  7:09 AM by Bubba Marlow MD     Bilateral cortical renal scarring with right simple renal cysts. Transient elevated creat         CKD (chronic kidney disease) stage 3, GFR 30-59 ml/min ([facility]) 2023 Yes    Hyperuricemia 2023 Yes       Plan:  Pain management  N.p.o. after midnight  IV fluids  Orthopedic surgery consult for further evaluation and definitive treatment  Check UA and preop labs including EKG  Monitor on telemetry, check troponin and BNP  Resume home regimen for chronic stable conditions    Disposition:   Current Living situation: SNF  Expected Disposition: SNF  Estimated D/C: 3 to 4 days    Diet Diet NPO   DVT Prophylaxis [x] Lovenox, []  Heparin, [] SCDs, [] Ambulation,  [] Eliquis, [] Xarelto, [] Coumadin   Code Status Full Code   Surrogate Decision Maker/ POA Calvin Lu     Personally reviewed Lab Studies and Imaging     Discussed management of the case with ED physician who recommended mission    EKG interpreted personally and results none done.     Imaging that was interpreted personally includes left hip pain and femur x-rays and results left intertrochanteric fracture, mildly process. NECK SOFT TISSUES: No acute or suspicious findings. VISUALIZED LUNG/MEDIASTINUM: Normal. DISC LEVELS: Diffuse degenerative disc disease again throughout the cervical spine most pronounced at C5-6 and C6-7 without high-grade bony central canal narrowing. No acute injury. Electronically signed by Chilo Hawkins MD    CT Head W/O Contrast    Result Date: 11/5/2023  CT HEAD WITHOUT CONTRAST: REASON FOR EXAM: fall TECHNIQUE: Axial CT imaging obtained from vertex to skull base. Axial images and multiplanar reformatted images reviewed. Individualized dose optimization technique was used in order to meet ALARA standards for radiation dose reduction. In addition to  vendor specific dose reduction algorithms, the dose reduction techniques vary based on the specific scanner utilized but frequently include automated exposure control, adjustment of the mA and/or kV according to patient size, and use of iterative reconstruction technique. COMPARISON: September 6, 2023 FINDINGS: There is no acute intracranial hemorrhage, midline shift, or mass effect. Age appropriate global atrophy. Periventricular and subcortical white matter low attenuation is most consistent with chronic small vessel disease. Gray-white differentiation is otherwise maintained. Ventricles are appropriate in size and position. Basal cisterns are preserved. Visualized paranasal sinuses and mastoid air cells are clear. No acute or suspicious bony abnormality. No acute intracranial process. Age-appropriate global atrophy with stable extensive chronic small vessel ischemic changes. Electronically signed by Chilo Hawkins MD    XR FEMUR LEFT (MIN 2 VIEWS)    Result Date: 11/5/2023  XR FEMUR LEFT (MIN 2 VIEWS) REASON FOR EXAM: fall, hip pain COMPARISON: Right hip radiographs September 21 2022 FINDINGS: 4 views of the left femur again demonstrate a mildly angulated intertrochanteric fracture of the proximal left femur. No distal femoral fractures.

## 2023-11-06 NOTE — PERIOP NOTE
According to family(son Jamie Murray) and patient's ECF chart Ms Matt Grier is a DNR CC. Son stated that \"someone told me that she needed to be a full code or they wouldn't do the surgery\" The addendum to the surgical consent form filled out and telephone consent from son obtained.

## 2023-11-06 NOTE — CONSULTS
[facility] Orthopedic Surgery  Consult Note         This patient is seen in consultation at the request of Dr Jennifer Biggs MD    Reason for Consult:  Left hip fracture. CHIEF COMPLAINT:  Left hip pain/ fall. History Obtained From:  patient, family member - son, electronic medical record    HISTORY OF PRESENT ILLNESS:    Ms. Alin Iglesias 82 y.o.  female with dementia seen today for consultation and evaluation of a left hip pain which occurred when she fell at [facility]. She is complaining of left hip pain. This is better with rest and worse with bearing any wt. The pain is sharp and not radiating. No numbness or tingling sensation. No other complaint. She was seen 1st at [facility], came via EMS, where she was x-rayed and I was consulted. Past Medical History:        Diagnosis Date    Advance directive discussed with patient     Asked to bring in:  tells me she is DNR    Chronic kidney disease (CKD) stage G3a/A1, moderately decreased glomerular filtration rate (GFR) between 45-59 mL/min/1.73 square meter and albuminuria creatinine ratio less than 30 mg/g (HCC)     Gout     Gout     HTN (hypertension)     Hypercholesteremia        Past Surgical History:        Procedure Laterality Date    HIP FRACTURE SURGERY Right 8/24/2022    RIGHT HIP FEMORAL NAILING performed by Fortunato Mckeon MD at [address] [place] [address] ([facility]  2005    Dr. Rodolfo Jauregui  Dec 2012    Dr Erlinda Coleman :  Right    [address] [facility]         Medications prior to admission:   Prior to Admission medications    Medication Sig Start Date End Date Taking?  Authorizing Provider   aspirin 325 MG EC tablet Take 1 tablet by mouth daily 8/25/22 9/24/22  Lien RICHARDS, APRN - CNP   atorvastatin (LIPITOR) 40 MG tablet Take 40 mg by mouth daily    ProviderRinku MD   busPIRone (BUSPAR) 5 MG tablet Take 5 mg by mouth 2 times daily    ProviderRinku MD   melatonin 3 MG TABS Lab Results   Component Value Date/Time    APTT 29.8 11/06/2023 12:39 AM   [APTT    IMAGING: X-rays were taken 11/5/2023, 3 views of the left hip and AP pelvis, was reviewed and showed intertrochanteric displaced fracture. IMPRESSION: Left femur intertrochanteric fracture. PLAN:   I discussed the overall alignment of the fracture with the patient and her son, and treatment options including both surgical and non-surgical treatment, and that my recommendation would be an open reduction and internal fixation with Gamma nail given the amount of displacement of the fracture. I discussed the risks and benefits of surgery with the patient, including but not limited to infection, bleeding, pain, injury to nerves or blood vessels failure of the surgery and need for additional surgery. All the patient's questions were answered. We discussed an expected post-operative course. He is understanding of this and wishes to proceed. Surgery today if medically stable. Thank you very much for the kind consultation and allowing me to participate in this patient's care. I will continue to keep you apprised of her progress.         Isaac Szymanski MD   11/6/2023  6:46 AM

## 2023-11-06 NOTE — PLAN OF CARE
[facility] Orthopedic Surgery  Consult Note          Orthopedic Consult, full note to follow. Roe Queen 82 y.o. admitted for a fall with left hip pain. Xray reviewed, and showed left femur intertrochanteric fracture. Plan:  - Recommend left femur Gamma nail.  - Surgery tomorrow after noon if medically stable. Thank you very much for the kind consultation and allowing me to participate in this patient's care. I will continue to keep you apprised of her progress.          Kimberly Peres MD, 11/5/2023 11:09 PM

## 2023-11-06 NOTE — BRIEF OP NOTE
Brief Postoperative Note      Patient: Shay Pavon  YOB: 1935  MRN: [id number]    Date of Procedure: 11/6/2023    Pre-Op Diagnosis Codes: * Type I or II open fracture of left hip, initial encounter ([facility]) [S72.002B]    Post-Op Diagnosis: Same       Procedure(s):  LEFT HIP GAMMA NAILING    Surgeon(s):  David Blackwell MD    Assistant:  Surgical Assistant: Zev Tellez    Anesthesia: General    Estimated Blood Loss (mL): Minimal    Complications: None    Specimens:   * No specimens in log *    Implants:  Implant Name Type Inv. Item Serial No.  Lot No. LRB No. Used Action   NAIL FEMUR LONG LEFT 10 X 380MM - [id number]  NAIL FEMUR LONG LEFT 10 X 380MM  [employer]-WD [id number] Left 1 Implanted   SCREW LAG GAMMA 10.5X85MM - [id number]  SCREW LAG GAMMA 10.5X85MM  [employer] HOW-WD [id number] Left 1 Implanted         Drains: * No LDAs found *    Findings: Same.       Electronically signed by David Blackwell MD on 11/6/2023 at 6:00 PM

## 2023-11-06 NOTE — PROGRESS NOTES
Pt A&Ox1 with confusion. Afternoon vitals indicated bradycardia as well as oxygen saturation below 90% on RA. MD alerted and patient started on 1L NC with improvement in oxygenation. Pt prepped for surgery with CHG bath and gown change. Tolerating IV fluids well. Stephanie Murphy in place and pt noted to have bowel movement during diaper change. SCDs on, and safety precautions in place including bed alarm on and video monitor in use.
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
## 2023-11-07 VITALS
SYSTOLIC BLOOD PRESSURE: 114 MMHG | TEMPERATURE: 98 F | BODY MASS INDEX: 23.52 KG/M2 | DIASTOLIC BLOOD PRESSURE: 72 MMHG | RESPIRATION RATE: 16 BRPM | HEART RATE: 77 BPM | HEIGHT: 60 IN | WEIGHT: 119.8 LBS | OXYGEN SATURATION: 92 %

## 2023-11-07 LAB
AMMONIA PLAS-SCNC: 14 UMOL/L (ref 11–51)
ANION GAP SERPL CALCULATED.3IONS-SCNC: 12 MMOL/L (ref 3–16)
BASOPHILS # BLD: 0 K/UL (ref 0–0.2)
BASOPHILS NFR BLD: 0 %
BILIRUB UR QL STRIP.AUTO: NEGATIVE
BUN SERPL-MCNC: 19 MG/DL (ref 7–20)
CALCIUM SERPL-MCNC: 9.3 MG/DL (ref 8.3–10.6)
CHLORIDE SERPL-SCNC: 109 MMOL/L (ref 99–110)
CLARITY UR: CLEAR
CO2 SERPL-SCNC: 24 MMOL/L (ref 21–32)
COLOR UR: YELLOW
CREAT SERPL-MCNC: 1 MG/DL (ref 0.6–1.2)
DEPRECATED RDW RBC AUTO: 15.1 % (ref 12.4–15.4)
DEPRECATED RDW RBC AUTO: 15.5 % (ref 12.4–15.4)
EOSINOPHIL # BLD: 0 K/UL (ref 0–0.6)
EOSINOPHIL NFR BLD: 0 %
GFR SERPLBLD CREATININE-BSD FMLA CKD-EPI: 55 ML/MIN/{1.73_M2}
GLUCOSE SERPL-MCNC: 146 MG/DL (ref 70–99)
GLUCOSE UR STRIP.AUTO-MCNC: NEGATIVE MG/DL
HCT VFR BLD AUTO: 34.5 % (ref 36–48)
HCT VFR BLD AUTO: 39.2 % (ref 36–48)
HGB BLD-MCNC: 11.2 G/DL (ref 12–16)
HGB BLD-MCNC: 12.5 G/DL (ref 12–16)
HGB UR QL STRIP.AUTO: NEGATIVE
KETONES UR STRIP.AUTO-MCNC: NEGATIVE MG/DL
LEUKOCYTE ESTERASE UR QL STRIP.AUTO: ABNORMAL
LYMPHOCYTES # BLD: 1 K/UL (ref 1–5.1)
LYMPHOCYTES NFR BLD: 12 %
MAGNESIUM SERPL-MCNC: 1.7 MG/DL (ref 1.8–2.4)
MCH RBC QN AUTO: 29.8 PG (ref 26–34)
MCH RBC QN AUTO: 30.7 PG (ref 26–34)
MCHC RBC AUTO-ENTMCNC: 31.8 G/DL (ref 31–36)
MCHC RBC AUTO-ENTMCNC: 32.6 G/DL (ref 31–36)
MCV RBC AUTO: 93.6 FL (ref 80–100)
MCV RBC AUTO: 94.1 FL (ref 80–100)
MONOCYTES # BLD: 0.6 K/UL (ref 0–1.3)
MONOCYTES NFR BLD: 6.8 %
NEUTROPHILS # BLD: 7 K/UL (ref 1.7–7.7)
NEUTROPHILS NFR BLD: 81.2 %
NITRITE UR QL STRIP.AUTO: NEGATIVE
PH UR STRIP.AUTO: 6 [PH] (ref 5–8)
PLATELET # BLD AUTO: 197 K/UL (ref 135–450)
PLATELET # BLD AUTO: 247 K/UL (ref 135–450)
PMV BLD AUTO: 8.4 FL (ref 5–10.5)
PMV BLD AUTO: 8.6 FL (ref 5–10.5)
POTASSIUM SERPL-SCNC: 3.3 MMOL/L (ref 3.5–5.1)
PROT UR STRIP.AUTO-MCNC: NEGATIVE MG/DL
RBC # BLD AUTO: 3.66 M/UL (ref 4–5.2)
RBC # BLD AUTO: 4.19 M/UL (ref 4–5.2)
RBC #/AREA URNS HPF: NORMAL /HPF (ref 0–4)
SODIUM SERPL-SCNC: 145 MMOL/L (ref 136–145)
SP GR UR STRIP.AUTO: 1.02 (ref 1–1.03)
UA COMPLETE W REFLEX CULTURE PNL UR: ABNORMAL
UA DIPSTICK W REFLEX MICRO PNL UR: YES
URN SPEC COLLECT METH UR: ABNORMAL
UROBILINOGEN UR STRIP-ACNC: 0.2 E.U./DL
WBC # BLD AUTO: 12.6 K/UL (ref 4–11)
WBC # BLD AUTO: 8.6 K/UL (ref 4–11)
WBC #/AREA URNS HPF: NORMAL /HPF (ref 0–5)

## 2023-11-07 PROCEDURE — 51798 US URINE CAPACITY MEASURE: CPT

## 2023-11-07 PROCEDURE — 85027 COMPLETE CBC AUTOMATED: CPT

## 2023-11-07 PROCEDURE — 97530 THERAPEUTIC ACTIVITIES: CPT

## 2023-11-07 PROCEDURE — 2580000003 HC RX 258: Performed by: ORTHOPAEDIC SURGERY

## 2023-11-07 PROCEDURE — 97162 PT EVAL MOD COMPLEX 30 MIN: CPT

## 2023-11-07 PROCEDURE — 6370000000 HC RX 637 (ALT 250 FOR IP): Performed by: ORTHOPAEDIC SURGERY

## 2023-11-07 PROCEDURE — 82140 ASSAY OF AMMONIA: CPT

## 2023-11-07 PROCEDURE — 85025 COMPLETE CBC W/AUTO DIFF WBC: CPT

## 2023-11-07 PROCEDURE — 97535 SELF CARE MNGMENT TRAINING: CPT

## 2023-11-07 PROCEDURE — APPNB45 APP NON BILLABLE 31-45 MINUTES: Performed by: PHYSICIAN ASSISTANT

## 2023-11-07 PROCEDURE — 81001 URINALYSIS AUTO W/SCOPE: CPT

## 2023-11-07 PROCEDURE — 80048 BASIC METABOLIC PNL TOTAL CA: CPT

## 2023-11-07 PROCEDURE — 93005 ELECTROCARDIOGRAM TRACING: CPT | Performed by: INTERNAL MEDICINE

## 2023-11-07 PROCEDURE — 6360000002 HC RX W HCPCS: Performed by: INTERNAL MEDICINE

## 2023-11-07 PROCEDURE — 99024 POSTOP FOLLOW-UP VISIT: CPT | Performed by: PHYSICIAN ASSISTANT

## 2023-11-07 PROCEDURE — 6370000000 HC RX 637 (ALT 250 FOR IP): Performed by: INTERNAL MEDICINE

## 2023-11-07 PROCEDURE — 97166 OT EVAL MOD COMPLEX 45 MIN: CPT

## 2023-11-07 PROCEDURE — 36415 COLL VENOUS BLD VENIPUNCTURE: CPT

## 2023-11-07 PROCEDURE — 6360000002 HC RX W HCPCS: Performed by: ORTHOPAEDIC SURGERY

## 2023-11-07 PROCEDURE — 83735 ASSAY OF MAGNESIUM: CPT

## 2023-11-07 PROCEDURE — 51701 INSERT BLADDER CATHETER: CPT

## 2023-11-07 RX ORDER — LOPERAMIDE HYDROCHLORIDE 2 MG/1
2 CAPSULE ORAL EVERY 4 HOURS PRN
COMMUNITY

## 2023-11-07 RX ORDER — OXYCODONE HYDROCHLORIDE 5 MG/1
5 TABLET ORAL EVERY 6 HOURS PRN
Qty: 12 TABLET | Refills: 0 | Status: SHIPPED | OUTPATIENT
Start: 2023-11-07 | End: 2023-11-10

## 2023-11-07 RX ORDER — LANOLIN ALCOHOL/MO/W.PET/CERES
3 CREAM (GRAM) TOPICAL NIGHTLY
Qty: 30 TABLET | Refills: 0
Start: 2023-11-07

## 2023-11-07 RX ORDER — HALOPERIDOL 5 MG/ML
5 INJECTION INTRAMUSCULAR ONCE
Status: COMPLETED | OUTPATIENT
Start: 2023-11-07 | End: 2023-11-07

## 2023-11-07 RX ORDER — DIVALPROEX SODIUM 125 MG/1
250 CAPSULE, COATED PELLETS ORAL NIGHTLY
COMMUNITY

## 2023-11-07 RX ORDER — ONDANSETRON 4 MG/1
4 TABLET, FILM COATED ORAL EVERY 8 HOURS PRN
Status: ON HOLD | COMMUNITY
End: 2023-11-07 | Stop reason: HOSPADM

## 2023-11-07 RX ORDER — MAGNESIUM HYDROXIDE/ALUMINUM HYDROXICE/SIMETHICONE 120; 1200; 1200 MG/30ML; MG/30ML; MG/30ML
30 SUSPENSION ORAL EVERY 6 HOURS PRN
COMMUNITY

## 2023-11-07 RX ORDER — MIRTAZAPINE 15 MG/1
7.5 TABLET, FILM COATED ORAL NIGHTLY
COMMUNITY

## 2023-11-07 RX ORDER — LORAZEPAM 1 MG/1
0.5 TABLET ORAL 2 TIMES DAILY
Qty: 1 TABLET | Refills: 0 | Status: SHIPPED | OUTPATIENT
Start: 2023-11-07 | End: 2023-11-08

## 2023-11-07 RX ORDER — ACETAMINOPHEN 325 MG/1
650 TABLET ORAL EVERY 4 HOURS PRN
COMMUNITY

## 2023-11-07 RX ORDER — LORAZEPAM 1 MG/1
1 TABLET ORAL 2 TIMES DAILY
Status: ON HOLD | COMMUNITY
End: 2023-11-07 | Stop reason: SDUPTHER

## 2023-11-07 RX ORDER — DIVALPROEX SODIUM 125 MG/1
125 CAPSULE, COATED PELLETS ORAL DAILY
COMMUNITY

## 2023-11-07 RX ORDER — METAXALONE 800 MG/1
800 TABLET ORAL EVERY 8 HOURS PRN
Qty: 30 TABLET | Refills: 0
Start: 2023-11-07 | End: 2023-11-17

## 2023-11-07 RX ADMIN — THIAMINE HYDROCHLORIDE 100 MG: 100 INJECTION, SOLUTION INTRAMUSCULAR; INTRAVENOUS at 09:25

## 2023-11-07 RX ADMIN — BUSPIRONE HYDROCHLORIDE 5 MG: 5 TABLET ORAL at 09:23

## 2023-11-07 RX ADMIN — HALOPERIDOL LACTATE 5 MG: 5 INJECTION, SOLUTION INTRAMUSCULAR at 15:36

## 2023-11-07 RX ADMIN — OXYCODONE 10 MG: 5 TABLET ORAL at 13:09

## 2023-11-07 RX ADMIN — CEFAZOLIN 2000 MG: 2 INJECTION, POWDER, FOR SOLUTION INTRAMUSCULAR; INTRAVENOUS at 09:57

## 2023-11-07 RX ADMIN — ENOXAPARIN SODIUM 30 MG: 100 INJECTION SUBCUTANEOUS at 09:23

## 2023-11-07 RX ADMIN — ASPIRIN 81 MG: 81 TABLET, CHEWABLE ORAL at 09:23

## 2023-11-07 RX ADMIN — SODIUM CHLORIDE, PRESERVATIVE FREE 10 ML: 5 INJECTION INTRAVENOUS at 09:25

## 2023-11-07 RX ADMIN — OXYCODONE 10 MG: 5 TABLET ORAL at 01:25

## 2023-11-07 RX ADMIN — OXYCODONE 10 MG: 5 TABLET ORAL at 06:15

## 2023-11-07 RX ADMIN — ACETAMINOPHEN 1000 MG: 500 TABLET ORAL at 13:09

## 2023-11-07 RX ADMIN — ACETAMINOPHEN 1000 MG: 500 TABLET ORAL at 06:15

## 2023-11-07 RX ADMIN — POTASSIUM BICARBONATE 40 MEQ: 782 TABLET, EFFERVESCENT ORAL at 15:08

## 2023-11-07 RX ADMIN — Medication 10 ML: at 09:58

## 2023-11-07 RX ADMIN — SODIUM CHLORIDE: 9 INJECTION, SOLUTION INTRAVENOUS at 09:56

## 2023-11-07 RX ADMIN — CEFAZOLIN 2000 MG: 2 INJECTION, POWDER, FOR SOLUTION INTRAMUSCULAR; INTRAVENOUS at 01:28

## 2023-11-07 ASSESSMENT — PAIN SCALES - PAIN ASSESSMENT IN ADVANCED DEMENTIA (PAINAD)
TOTALSCORE: 3
CONSOLABILITY: NO NEED TO CONSOLE
FACIALEXPRESSION: 0
TOTALSCORE: 0
NEGVOCALIZATION: 0
FACIALEXPRESSION: SMILING OR INEXPRESSIVE
FACIALEXPRESSION: SMILING OR INEXPRESSIVE
TOTALSCORE: 9
BODYLANGUAGE: 0
BREATHING: OCCASIONAL LABORED BREATHING, SHORT PERIOD OF HYPERVENTILATION
CONSOLABILITY: 0
BREATHING: 2
BREATHING: 2
CONSOLABILITY: DISTRACTED OR REASSURED BY VOICE/TOUCH
FACIALEXPRESSION: FACIAL GRIMACING
BODYLANGUAGE: RIGID, FISTS CLENCHED, KNEES UP, PUSHING/PULLING AWAY, STRIKES OUT
NEGVOCALIZATION: 2
BREATHING: 1
NEGVOCALIZATION: 1
BODYLANGUAGE: 2
BREATHING: NOISY LABORED BREATHING, LONG PERIODS HYPERVENTILATION, CHEYNE-STOKES RESPIRATIONS
CONSOLABILITY: DISTRACTED OR REASSURED BY VOICE/TOUCH
NEGVOCALIZATION: REPEATED TROUBLED CALLING OUT, LOUD MOANING/GROANING, CRYING
BODYLANGUAGE: 0
FACIALEXPRESSION: FACIAL GRIMACING
BODYLANGUAGE: 2
CONSOLABILITY: 1
BREATHING: 0
FACIALEXPRESSION: 0
BREATHING: NORMAL
CONSOLABILITY: 1
TOTALSCORE: 9
FACIALEXPRESSION: 2
BODYLANGUAGE: RIGID, FISTS CLENCHED, KNEES UP, PUSHING/PULLING AWAY, STRIKES OUT
BODYLANGUAGE: RELAXED
BREATHING: NOISY LABORED BREATHING, LONG PERIODS HYPERVENTILATION, CHEYNE-STOKES RESPIRATIONS
CONSOLABILITY: 1
NEGVOCALIZATION: OCCASIONAL MOAN/GROAN, LOW SPEECH, NEGATIVE/DISAPPROVING QUALITY
NEGVOCALIZATION: REPEATED TROUBLED CALLING OUT, LOUD MOANING/GROANING, CRYING
CONSOLABILITY: DISTRACTED OR REASSURED BY VOICE/TOUCH
FACIALEXPRESSION: 2
NEGVOCALIZATION: 2
BODYLANGUAGE: RELAXED

## 2023-11-07 ASSESSMENT — PAIN SCALES - WONG BAKER
WONGBAKER_NUMERICALRESPONSE: 8
WONGBAKER_NUMERICALRESPONSE: HURTS WHOLE LOT
WONGBAKER_NUMERICALRESPONSE: 0
WONGBAKER_NUMERICALRESPONSE: NO HURT
WONGBAKER_NUMERICALRESPONSE: 0

## 2023-11-07 NOTE — PROGRESS NOTES
Patient straight cath'd per order for 550mL of clear, yellow urine. UA sent per order. Purewick back in place.      Electronically signed by Ayse Cain RN on 11/7/2023 at 12:27 AM

## 2023-11-07 NOTE — PROGRESS NOTES
Clinical Pharmacy Progress Note  Medication History     Admit Date: 11/5/2023    List of of current medications patient is taking is complete. Home Medication list in EPIC updated to reflect changes noted below. Source of information: medication list from [facility]; list dated 11/5/23    Changes made to medication list:   Medications removed: (include reason, ex: therapy completed, patient no longer taking, etc.)  Aspirin - not on facility med list   Atorvastatin - not on facility med list   Buspar - not on facility med list  Vitamin B12- not on facility med list  Melatonin - not on facility med list  Medications added:   Lorazepam 1mg po BID  Mirtazapine 7.5mg po nightly   Divalproex sprinkle caps - 125mg po qAM + 250mg po QHS  Acetaminophen  Maalox   Ondansetron   Milk of mag   Cepacol lozenges  Loperamide     Current Outpatient Medications   Medication Instructions    acetaminophen (TYLENOL) 650 mg, Oral, EVERY 4 HOURS PRN    aluminum & magnesium hydroxide-simethicone (MAALOX) 200-200-20 MG/5ML SUSP suspension 30 mLs, Oral, EVERY 6 HOURS PRN    amLODIPine (NORVASC) 10 mg, Oral, DAILY    benzocaine-menthol (CEPACOL SORE THROAT) 15-3.6 MG lozenge 1 lozenge, Oral, EVERY 2 HOURS PRN    divalproex (DEPAKOTE SPRINKLE) 125 mg, Oral, DAILY    divalproex (DEPAKOTE SPRINKLE) 250 mg, Oral, Nightly    donepezil (ARICEPT) 10 mg, Oral, NIGHTLY    loperamide (IMODIUM) 2 mg, Oral, EVERY 4 HOURS PRN    LORazepam (ATIVAN) 1 mg, Oral, 2 TIMES DAILY    magnesium hydroxide (MILK OF MAGNESIA CONCENTRATE) 2,400 mg, Oral, DAILY    mirtazapine (REMERON) 7.5 mg, Oral, Nightly    ondansetron (ZOFRAN) 4 mg, Oral, EVERY 8 HOURS PRN    oxyCODONE (ROXICODONE) 5 mg, Oral, EVERY 6 HOURS PRN, Intended supply: 3 days.  Take lowest dose possible to manage pain    vitamin D (CHOLECALCIFEROL) 1,000 Units, Oral, DAILY     Please call with questions--  Raj Pulliam PharmD, BCPS  Wireless: [phone]   11/7/2023 12:03 PM

## 2023-11-07 NOTE — OP NOTE
[address] [facility],[address] Floor               [address]                                OPERATIVE REPORT    PATIENT NAME: Timmy Bailon                  :        1935  MED REC NO:   [id number]                          ROOM:       5305  ACCOUNT NO:   [de-identified]                           ADMIT DATE: 2023  PROVIDER:     Jojo Moya MD    DATE OF PROCEDURE:  2023    PRIMARY CARE:  Suzanne Palacios MD    PREOPERATIVE DIAGNOSIS:  Left femur intertrochanteric displaced  fracture. POSTOPERATIVE DIAGNOSIS:  Left femur intertrochanteric displaced  fracture. OPERATION PERFORMED:  Open treatment of left femur intertrochanteric  fracture with a locked gamma nail. SURGEON:  Jojo Moya MD    ASSISTANT:  Fermin Shaw, Surgical Assistant. ANESTHESIA:  General anesthesia. ESTIMATED BLOOD LOSS:  Minimal.    COMPLICATIONS:  None. IMPLANTS USED:  Guadalupe titanium Gamma4 size 10 x 380 with 85 mm hip  screw and one distal locking screw. INDICATION:  This is an 74-year-old, white female with dementia, who  sustained a fall at the [facility] with left hip pain. She  was found to have a displaced intertrochanteric femur fracture after she  was evaluated at [facility]  I was consulted for her injury. All  risks, benefits, and alternatives were discussed with the patient's son,  Serena Corley, who has the power of . He agreed to proceed with  surgical fixation. DESCRIPTION OF THE PROCEDURE:  The patient's left hip was marked. She  received 2 gm Ancef IV preoperatively. She was then brought to the  operating room and underwent general anesthesia. She was then  transferred to the [facility] table. The left foot was secured in a boot and  the right leg in leg bolster. Closed reduction was performed. We were  able to achieve near anatomic reduction on the [place] table.   At this  point, we had the left hip and lower extremity prepped and draped in  regular sterile routine fashion. A time-out was called confirming the  patient name, site, and procedure. Small incision was made proximal to the greater trochanter and we used  an awl to create the entry point. That was confirmed with the C-arm in  two planes to be in good position. The awl was then advanced into the  proximal femur. The guidewire was then advanced distally and  measurement revealed that 380 would be appropriate size gamma nail. We  then used a 15 mm opening reamer to open the entry point. We then  passed the gamma nail over the guidewire, which was then removed. We  then made small stab incision for the hip screw and the pin was then  placed in central part of the head and neck. An 85 mm hip screw was  chosen, with good purchase and good compression across the fracture and  that was secured with a set screw. We then used the aiming arm and we  placed one distal locking screw. Instrumentation was removed and C-arm  confirmed the anatomic reduction and good position of the gamma nail,  hip screw, and one distal locking screw. We then irrigated the incision  copiously with normal saline. We then closed the incision with a 2-0  and 3-0 Vicryl and skin with staples. Dressing was then applied in the  form of Mepilex. The patient tolerated the procedure well and was taken to the Recovery  in stable condition. POSTOPERATIVE PLAN:  The patient will be readmitted as an inpatient. We  will start PT/OT with weightbearing as tolerated. She will need a rehab  placement.         Kulwinder Estrada MD    D: 11/06/2023 18:08:37       T: 11/06/2023 20:58:22     /[email or web address]_IN  Job#: [id number]     Doc#: [id number]    CC:  Jyotsna Kelley MD

## 2023-11-07 NOTE — DISCHARGE INSTR - COC
Continuity of Care Form    Patient Name: Angelo Watkins   :  1935  MRN:  [id number]    Admit date:  2023  Discharge date:  t      Code Status Order: DNR-CCA   Advance Directives:    Viktoriya Abdoulayemary Documentation       Date/Time Healthcare Directive Type of Healthcare Directive Copy in [address] Agent's Name Healthcare Agent's Phone Number    23 2020 Yes, patient has an advance directive for healthcare treatment Durable power of  for health care;Living will No, copy requested from family Adult Children Son Sheree Whiteside [phone]            Admitting Physician:  Franklin Garcia MD  PCP: Aicha Banks    Discharging Nurse: Hanane Burgos, [facility] Unit/Room#: 0193/9097-40  Discharging Unit Phone Number: [phone]    Emergency Contact:   Extended Emergency Contact Information  Primary Emergency Contact:  [facility] Phone: [phone]  Mobile Phone: [phone]  Relation: Child  Secondary Emergency Contact: [place], [facility] [address] [facility] Phone: [phone]  Mobile Phone: [phone]  Relation: Other    Past Surgical History:  Past Surgical History:   Procedure Laterality Date    HIP FRACTURE SURGERY Right 2022    RIGHT HIP FEMORAL NAILING performed by Rodo Dolan MD at [address] Left 2023    LEFT HIP GAMMA NAILING performed by Hamida Salinas MD at [address] [facility] [address] ( [facility])  Jamil Garcias      Dr. Elpidio Garner  Dec 2012    Dr Raenelle Cranker :  Right    VARICOSE VEIN SURGERY         Immunization History:   Immunization History   Administered Date(s) Administered    COVID-19, PFIZER PURPLE top, DILUTE for use, (age 15 y+), 30mcg/0.3mL 10/04/2021, 10/24/2021, 2022    Influenza Vaccine, unspecified formulation 10/03/2016    Influenza Virus Vaccine 10/16/2013, 10/09/2014 2:07 PM EST

## 2023-11-07 NOTE — DISCHARGE INSTRUCTIONS
Discharge Instructions    To prevent Clot formation, you have been placed on the following medication:  ASA 81 mg BID   Surgical Site Care:  Change dressings as needed for drainage or discharge   Physical Therapy:  Weight Bearing Status:     Weight bearing as tolerated  Precautions  Per Physical Therapy handout  Pain Medications  You were given oxycodone (Oxycontin, Oxyir)  Wean off pain medications as you deem appropriate as long as pain is under control  Be sure to drink plenty of fluids (recommend water) while taking narcotic pain medications to prevent constipation  You may take an over the counter laxative or stool softener as needed to prevent/treat constipation as well, we recommend Senokot S OTC. We recommend that you consider taking these medications the entire time you are taking pain medication. Cold packs/Ice packs/Machine  May be used as much as necessary to reduce swelling/inflammation/soreness  Be sure to have a barrier (cloth, clothing, towel) between your skin/incision and the ice pack to prevent frostbite  Contact office if  Increased redness, swelling, drainage of any kind, and/or pain to surgery site. As well as new onset fevers and or chills. These could signify an infection. Calf or thigh tenderness to touch as well as increased swelling or redness. This could signify a clot formation. Numbness or tingling to an area around the incision site or below the incision site (toes). Any rash appears, increased  or new onset nausea/vomiting occur. This may indicate a reaction to a medication. Phone # [phone]  Follow up with Dr. Porsha Ordoñez at scheduled appointment time. Please continue to use your Incentive Spirometer at home every hour while awake.

## 2023-11-07 NOTE — PLAN OF CARE
Problem: Pain  Goal: Verbalizes/displays adequate comfort level or baseline comfort level  11/6/2023 2309 by Hanny Cesar RN  Outcome: Progressing  11/6/2023 1515 by Siobhan Louise RN  Flowsheets (Taken 11/6/2023 1515)  Verbalizes/displays adequate comfort level or baseline comfort level: Administer analgesics based on type and severity of pain and evaluate response     Problem: Skin/Tissue Integrity  Goal: Absence of new skin breakdown  Description: 1. Monitor for areas of redness and/or skin breakdown  2. Assess vascular access sites hourly  3. Every 4-6 hours minimum:  Change oxygen saturation probe site  4. Every 4-6 hours:  If on nasal continuous positive airway pressure, respiratory therapy assess nares and determine need for appliance change or resting period.   Outcome: Progressing     Problem: Safety - Adult  Goal: Free from fall injury  Outcome: Progressing

## 2023-11-07 NOTE — PROGRESS NOTES
Patient HR in 80s-90s while awake. When sleeping, HR will drop into 40s. Pt asymptomatic and HR increases to baseline when patient wakes. MD made aware.

## 2023-11-07 NOTE — PROGRESS NOTES
Pt charles to get out of chair, this RN, with assist x2 other Rns put pt back in bed with stedy. Pt tolerated transfer very poorly. Pt unable to follow commands, is inconsolable, shouting \"I'm going to die. \" This RN offered verbal encouragement without success. PT refusing to wear a hospital gown, pt pulled off tele and removed PIV x2. Pt not allowing to wear another hospital gown at this time. This RN tried to administer pt's pain meds but she spit them out. Pt also hasn't voided, bladder scan performed, showing 291 mL in her bladder. This RN reached out to Dr. Stephenie Bergman for pt's concerns.

## 2023-11-07 NOTE — PROGRESS NOTES
Occupational Therapy  Facility/Department: [address] [place] [address]  Occupational Therapy Initial Assessment and Treatment Note     Name: Wilfred Mcmahon  : 1935  MRN: [id number]  Date of Service: 2023    Discharge Recommendations: Wilfred Mcmahon scored a  on the AM-PAC ADL Inpatient form. Current research shows that an AM-PAC score of 17 or less is typically not associated with a discharge to the patient's home setting. Based on the patient's AM-PAC score and their current ADL deficits, it is recommended that the patient have 3-5 sessions per week of Occupational Therapy at d/c to increase the patient's independence. Please see assessment section for further patient specific details. If patient discharges prior to next session this note will serve as a discharge summary. Please see below for the latest assessment towards goals. OT Equipment Recommendations  Equipment Needed: No  Other: defer to LTC facility       Patient Diagnosis(es): The encounter diagnosis was Closed displaced intertrochanteric fracture of left femur, initial encounter ([address]). Past Medical History:  has a past medical history of Advance directive discussed with patient, Chronic kidney disease (CKD) stage G3a/A1, moderately decreased glomerular filtration rate (GFR) between 45-59 mL/min/1.73 square meter and albuminuria creatinine ratio less than 30 mg/g ([address]), Gout, Gout, HTN (hypertension), and Hypercholesteremia. Past Surgical History:  has a past surgical history that includes Hysterectomy (); Varicose vein surgery; Rectocele repair (); Total knee arthroplasty (Dec 2012); Hip fracture surgery (Right, 2022); and hip surgery (Left, 2023). Treatment Diagnosis: impaired ADLs/ functional transfers / mobility 2/2 L hip fx      Assessment   Performance deficits / Impairments: Decreased functional mobility ; Decreased endurance;Decreased ADL status; Decreased safe awareness  Assessment: Pt from LTC

## 2023-11-07 NOTE — PROGRESS NOTES
Patient has not urinated since returning from surgery. Bladder scanned for 495mL. MD aware, awaiting orders.      Electronically signed by Karly Vallejo RN on 11/6/2023 at 10:58 PM

## 2023-11-07 NOTE — PLAN OF CARE
Problem: Discharge Planning  Goal: Discharge to home or other facility with appropriate resources  Outcome: Adequate for Discharge     Problem: Pain  Goal: Verbalizes/displays adequate comfort level or baseline comfort level  Outcome: Adequate for Discharge     Problem: Skin/Tissue Integrity  Goal: Absence of new skin breakdown  Description: 1. Monitor for areas of redness and/or skin breakdown  2. Assess vascular access sites hourly  3. Every 4-6 hours minimum:  Change oxygen saturation probe site  4. Every 4-6 hours:  If on nasal continuous positive airway pressure, respiratory therapy assess nares and determine need for appliance change or resting period.   Outcome: Adequate for Discharge     Problem: Safety - Adult  Goal: Free from fall injury  Outcome: Adequate for Discharge  Flowsheets (Taken 11/7/2023 1012)  Free From Fall Injury:   Instruct family/caregiver on patient safety   Based on caregiver fall risk screen, instruct family/caregiver to ask for assistance with transferring infant if caregiver noted to have fall risk factors     Problem: ABCDS Injury Assessment  Goal: Absence of physical injury  Outcome: Adequate for Discharge

## 2023-11-07 NOTE — CARE COORDINATION
Case Management Assessment            Discharge Note                    Date / Time of Note: 11/7/2023 2:36 PM                  Discharge Note Completed by: Johana Sanon RN    Patient Name: Manuel Elmore   YOB: 1935  Diagnosis: Closed displaced intertrochanteric fracture of left femur, initial encounter [facility]) [S72.142A]  Closed comminuted intertrochanteric fracture of proximal end of left femur[facility]   Date / Time: 11/5/2023  9:41 PM    Current PCP: Kelsy Pedroza, [address] [place] [address] patient: No    Hospitalization in the last 30 days: No    Advance Directives:  Code Status: DNR-CCA  [place] DNR form completed and on chart: Yes    Financial:  Payor: Laquita Adair I-SNP / Plan: Laquita Adair I-LEATHA / Product Type: *No Product type* /      Pharmacy:    [address] [place] [address] [phone] - f [phone]  [facility] [address]  Phone: [phone] Fax: [phone]    [address] [id number] [place] [phone]  F [phone]  [address] [place] [address]  Phone: [phone] Fax: [phone]    [address], [address] [phone] [facility] [phone]  [address] [place] [address]  Phone: [phone] Fax: [phone]      Assistance purchasing medications?: Potential Assistance Purchasing Medications: No  Assistance provided by Case Management: None at this time    Does patient want to participate in local refill/ meds to beds program?:      Meds To Beds General Rules:  1. Can ONLY be done Monday- Friday between 8:30am-5pm  2. Prescription(s) must be in pharmacy by 3pm to be filled same day  3. Copy of patient's insurance/ prescription drug card and patient face sheet must be sent along with the prescription(s)  4. Cost of Rx cannot be added to hospital bill.  If financial assistance is needed, please contact unit  or ;  or  CANNOT provide pharmacy voucher for patients co-pays  5. Patients can then  the prescription on their way out of the hospital at discharge, or pharmacy can deliver to the bedside if staff is available. (payment due at time of pick-up or delivery - cash, check, or card accepted)     Able to afford home medications/ co-pay costs: Yes    ADLS:  Current PT AM-PAC Score: 9 /24  Current OT AM-PAC Score: 12 /24      DISCHARGE Disposition: [address] [facility] [address] (LTC): [facility]   Phone: [phone]  Fax: [phone]    LOC at discharge: [facility] Completed: Yes    Notification completed in HENS/PAS?:  Not Applicable    IMM Completed:   Not Indicated due to: <3 days    Transportation:  Transportation PLAN for discharge: EMS transportation   Mode of Transport: Ambulance stretcher - BLS  Reason for medical transport: Bed confined: Meets the following criteria 1) unable to get out of bed without assistance or ambulate, 2) unable to safely sit up in a wheelchair, 3) unable to maintain erect seating position in a chair for time needed for transport  Name of Transport Company: [employer]: [id number]  Time of Transport: [facility]    Transport form completed: Yes      Additional CM Notes: patient to dc back to [facility], [address]. Plan for [facility] to [facility] while there. Call placed to son, Jamie NORA Murray with call back number. Facility aware.         RN to call report to: [phone]        The Plan for Transition of Care is related to the following treatment goals of Closed displaced intertrochanteric fracture of left femur, initial encounter [facility]) [S72.142A]  Closed comminuted intertrochanteric fracture of proximal end of left femur, initial encounter [facility]) Joycelyn Nino    The Patient and/or patient representative Shayna Carrion and her family were provided with a choice of provider and

## 2023-11-07 NOTE — PROGRESS NOTES
ambulate at this time. Pt is a high fall risk. Recommend further PT upon D/C. Will cont PT while here to maximize mobility. Treatment Diagnosis: decreased functional mobility  Therapy Prognosis: Good  Decision Making: Medium Complexity  Requires PT Follow-Up: Yes  Activity Tolerance  Activity Tolerance: Patient tolerated evaluation without incident;Patient limited by pain;Treatment limited secondary to decreased cognition     Plan   Physcial Therapy Plan  General Plan: 5-7 times per week  Current Treatment Recommendations: Strengthening, ROM, Balance training, Functional mobility training, Transfer training, Gait training, Safety education & training, Therapeutic activities  Safety Devices  Type of Devices: Call light within reach, Chair alarm in place, Nurse notified, Left in chair, Telesitter in use     Restrictions  Position Activity Restriction  Other position/activity restrictions: WBAT LLE, Activity day of sx,     Subjective   Pain: Pt denies pain at rest. c/o left LE pain with all mobility, unable to rate. RN in to give pain meds. General  Chart Reviewed: Yes  Patient assessed for rehabilitation services?: Yes  Additional Pertinent Hx: Roe Queen is a 82 y.o. female with history of dementia, who presents via EMS from [facility] after an unwitnessed fall and left hip pain. s/p: LEFT HIP GAMMA NAILING on 11/6. Referring Practitioner: Himanshu Hercules  Referral Date : 11/06/23  Diagnosis: left hip fx  Follows Commands: Impaired (follows simple commands with extra time/cueing)  Subjective  Subjective: Pt supine in bed & agreeable to PT. Pt confused.          Social/Functional History  Social/Functional History  Lives With: Other (comment) (LTC facility)  Type of Home: Facility ([facility])  Active : No  Vision/Hearing  Vision  Vision: Within Functional Limits  Hearing  Hearing: Within functional limits    Cognition   Orientation  Overall Orientation Status: Impaired  Orientation Level: Oriented to

## 2023-11-07 NOTE — PROGRESS NOTES
VSS, afebrile, pt currently working with therapy, up to chair. Pt a/o to self only, able to follow all commands but is forgetful, impulsive. Dressings to L hip CDI without s/s infection. POC explained to pt, fall px in place.

## 2023-11-07 NOTE — PROGRESS NOTES
Report called to nurse at [facility]. Questions/concerns addressed at this time. Pt was d/c'd off unit via stretcher.

## 2023-11-08 LAB
EKG ATRIAL RATE: 76 BPM
EKG DIAGNOSIS: NORMAL
EKG P AXIS: 71 DEGREES
EKG P-R INTERVAL: 150 MS
EKG Q-T INTERVAL: 386 MS
EKG QRS DURATION: 88 MS
EKG QTC CALCULATION (BAZETT): 434 MS
EKG R AXIS: -20 DEGREES
EKG T AXIS: -19 DEGREES
EKG VENTRICULAR RATE: 76 BPM

## 2023-11-08 PROCEDURE — 93010 ELECTROCARDIOGRAM REPORT: CPT | Performed by: INTERNAL MEDICINE

## 2023-11-16 ENCOUNTER — APPOINTMENT (OUTPATIENT)
Dept: GENERAL RADIOLOGY | Age: 88
End: 2023-11-16
Payer: MEDICARE

## 2023-11-16 ENCOUNTER — APPOINTMENT (OUTPATIENT)
Dept: CT IMAGING | Age: 88
End: 2023-11-16
Payer: MEDICARE

## 2023-11-16 ENCOUNTER — HOSPITAL ENCOUNTER (EMERGENCY)
Age: 88
Discharge: HOME | End: 2023-11-16
Attending: EMERGENCY MEDICINE
Payer: MEDICARE

## 2023-11-16 VITALS
HEART RATE: 82 BPM | TEMPERATURE: 97.8 F | RESPIRATION RATE: 18 BRPM | OXYGEN SATURATION: 98 % | WEIGHT: 113.6 LBS | DIASTOLIC BLOOD PRESSURE: 87 MMHG | SYSTOLIC BLOOD PRESSURE: 103 MMHG | BODY MASS INDEX: 22.19 KG/M2

## 2023-11-16 DIAGNOSIS — M79.605 LEFT LEG PAIN: ICD-10-CM
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
## 2023-11-16 PROCEDURE — 72125 CT NECK SPINE W/O DYE: CPT

## 2023-11-16 PROCEDURE — 99284 EMERGENCY DEPT VISIT MOD MDM: CPT

## 2023-11-16 PROCEDURE — 73700 CT LOWER EXTREMITY W/O DYE: CPT

## 2023-11-16 PROCEDURE — 70450 CT HEAD/BRAIN W/O DYE: CPT

## 2023-11-16 PROCEDURE — 73560 X-RAY EXAM OF KNEE 1 OR 2: CPT

## 2023-11-16 ASSESSMENT — PAIN DESCRIPTION - DESCRIPTORS: DESCRIPTORS: DISCOMFORT

## 2023-11-16 ASSESSMENT — PAIN DESCRIPTION - LOCATION: LOCATION: HEAD

## 2023-11-16 ASSESSMENT — PAIN SCALES - GENERAL: PAINLEVEL_OUTOF10: 0

## 2023-11-24 ENCOUNTER — TELEPHONE (OUTPATIENT)
Dept: ORTHOPEDIC SURGERY | Age: 88
End: 2023-11-24

## 2023-11-24 NOTE — TELEPHONE ENCOUNTER
General Question     Subject: VERBAL ORDERS   Patient and /or Facility Request: JESSE FROM [facility]  Contact Number: [phone]    JESSE FROM [facility] CALLING IN REGARDING NEEDING VERBAL ORDERS TO BE ABLE TO REMOVE STAPLES FROM THE PT LT HIP DUE TO HER HAVING SX ON 11-6-2023.     PLEASE CALL BACK NUMBER ABOVE WITH EXT.     OKAY TO LEAVE A MESSAGE WITH  IF NO ANSWER.

## 2023-11-24 NOTE — TELEPHONE ENCOUNTER
Called and  transferred to me Tash. Phone eventually went to a voicemail not matching name and indicated it was director of nursing for [facility]. Did not leave information as this does not match TE encounter name and facility below.

## 2023-11-24 NOTE — TELEPHONE ENCOUNTER
Tash called back stating that she received a call back but was not at her desk to answer and no message was left.  She is asking for a return call on her cell phone [phone]. She said there is voicemail option but it will not have her name on it, but to go ahead and leave a message if she is unable to answer.

## 2023-11-27 NOTE — TELEPHONE ENCOUNTER
Left Vm to return call. Vm did not state it was secure or confidential.     Upon return call, Verbal for staples can be given so long as the incision is healed and no signs of infection are present.

## 2023-12-01 ENCOUNTER — OFFICE VISIT (OUTPATIENT)
Dept: ORTHOPEDIC SURGERY | Age: 88
End: 2023-12-01

## 2023-12-01 DIAGNOSIS — S72.144D CLOSED NONDISPLACED INTERTROCHANTERIC FRACTURE OF RIGHT FEMUR WITH ROUTINE HEALING, SUBSEQUENT ENCOUNTER: Primary | ICD-10-CM

## 2023-12-02 NOTE — PROGRESS NOTES
DIAGNOSIS:    1- Left intertrochanteric femur comminuted fracture, status post Gamma nail. 2- New fall mid Nov, mild pain, better. DATE OF SURGERY:  11/6/2023 . HISTORY OF PRESENT ILLNESS: Ms. Zee Rosales 82 y.o.  female  who came in today for 4 weeks postoperative visit. She had a fall mid Nov after her surgery. The patient denies any significant pain in the left hip. She has been working on ROM and WBAT. No numbness or tingling sensation. No fever or Chills. PHYSICAL EXAMINATION:  The incision is healed well, left hip. No signs of any erythema or drainage. She has no pain with the active or passive range of motion of the left hip. She has intact sensation, distally, and is neurovascularly intact. IMAGING:  Two views left hip and femur, and AP pelvis showed minimal change in alignment of the intertrochanteric fracture, Gamma nail in good position, no loosening, or hardware failure. IMPRESSION:    1- Left intertrochanteric femur comminuted fracture, status post Gamma nail. 2- New fall mid Nov, mild pain, better. PLAN:  I have told the patient to work on ROM with  PT, TTWB for 4 weeks as well as strengthening exercises. The patient will come back for a follow up in 2 months. At that time, we will take two views left hip, and AP pelvis. As this patient has demonstrated risk factors for osteoporosis, such as age greater than fifty years and evidence of a fracture, I have referred the patient back to the primary care physician for evaluation for osteoporosis, including consideration for DEXA scanning, if this is felt to be clinically indicated. The patient is advised to contact the primary care physician to follow-up for further evaluation.        Crystal Davis MD

## 2024-02-14 ENCOUNTER — OFFICE VISIT (OUTPATIENT)
Dept: ORTHOPEDIC SURGERY | Age: 89
End: 2024-02-14
Payer: COMMERCIAL

## 2024-02-14 VITALS — WEIGHT: 113 LBS | BODY MASS INDEX: 22.19 KG/M2 | HEIGHT: 60 IN

## 2024-02-14 DIAGNOSIS — S72.142A CLOSED DISPLACED INTERTROCHANTERIC FRACTURE OF LEFT FEMUR, INITIAL ENCOUNTER (HCC): Primary | ICD-10-CM

## 2024-02-14 PROCEDURE — 99213 OFFICE O/P EST LOW 20 MIN: CPT | Performed by: ORTHOPAEDIC SURGERY

## 2024-02-14 PROCEDURE — 1123F ACP DISCUSS/DSCN MKR DOCD: CPT | Performed by: ORTHOPAEDIC SURGERY

## 2024-02-14 NOTE — PROGRESS NOTES
DIAGNOSIS:    1- Left intertrochanteric femur comminuted fracture, status post Gamma nail.  2- New fall mid Nov, mild pain, better.    DATE OF SURGERY:  11/6/2023 .    HISTORY OF PRESENT ILLNESS: Ms. Guy 88 y.o.  female  who came in today for 3 months postoperative visit. She had a fall mid Nov after her surgery. The patient denies any significant pain in the left hip, 0/10. She has been working on ROM and WBAT.  No numbness or tingling sensation. No fever or Chills.     Past Medical History:   Diagnosis Date    Advance directive discussed with patient     Asked to bring in:  tells me she is DNR    Chronic kidney disease (CKD) stage G3a/A1, moderately decreased glomerular filtration rate (GFR) between 45-59 mL/min/1.73 square meter and albuminuria creatinine ratio less than 30 mg/g (HCC)     Gout     Gout     HTN (hypertension)     Hypercholesteremia        Past Surgical History:   Procedure Laterality Date    HIP FRACTURE SURGERY Right 8/24/2022    RIGHT HIP FEMORAL NAILING performed by Jas Morin MD at [facility] OR    HIP SURGERY Left 11/6/2023    LEFT HIP GAMMA NAILING performed by Eric Robb MD at [facility] OR    HYSTERECTOMY (CERVIX STATUS UNKNOWN)  1971    RECTOCELE REPAIR  2005    Dr. Perez    TOTAL KNEE ARTHROPLASTY  Dec 2012    Dr Lambert :  Right    VARICOSE VEIN SURGERY         Social History     Socioeconomic History    Marital status: Single     Spouse name: Daughter Mariola Guy    Number of children: 3    Years of education: Not on file    Highest education level: Not on file   Occupational History    Occupation: cook   Tobacco Use    Smoking status: Never    Smokeless tobacco: Never   Substance and Sexual Activity    Alcohol use: Yes     Comment: Wine nightly    Drug use: No    Sexual activity: Not Currently   Other Topics Concern    Not on file   Social History Narrative    Not on file     Social Determinants of Health     Financial Resource Strain: Not on file   Food

## 2024-11-01 ENCOUNTER — APPOINTMENT (OUTPATIENT)
Dept: CT IMAGING | Age: 89
End: 2024-11-01
Payer: MEDICARE

## 2024-11-01 ENCOUNTER — APPOINTMENT (OUTPATIENT)
Dept: GENERAL RADIOLOGY | Age: 89
End: 2024-11-01
Payer: MEDICARE

## 2024-11-01 ENCOUNTER — HOSPITAL ENCOUNTER (EMERGENCY)
Age: 89
Discharge: HOME | End: 2024-11-01
Attending: EMERGENCY MEDICINE
Payer: MEDICARE

## 2024-11-01 VITALS
OXYGEN SATURATION: 96 % | SYSTOLIC BLOOD PRESSURE: 150 MMHG | HEART RATE: 87 BPM | DIASTOLIC BLOOD PRESSURE: 72 MMHG | TEMPERATURE: 98 F | RESPIRATION RATE: 15 BRPM

## 2024-11-01 DIAGNOSIS — S01.81XA FOREHEAD LACERATION, INITIAL ENCOUNTER: ICD-10-CM

## 2024-11-01 DIAGNOSIS — S09.90XA INJURY OF HEAD, INITIAL ENCOUNTER: ICD-10-CM
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
## 2024-11-01 DIAGNOSIS — S01.81XA CHIN LACERATION, INITIAL ENCOUNTER: ICD-10-CM

## 2024-11-01 PROCEDURE — 90471 IMMUNIZATION ADMIN: CPT | Performed by: EMERGENCY MEDICINE

## 2024-11-01 PROCEDURE — 6370000000 HC RX 637 (ALT 250 FOR IP): Performed by: EMERGENCY MEDICINE

## 2024-11-01 PROCEDURE — 73030 X-RAY EXAM OF SHOULDER: CPT

## 2024-11-01 PROCEDURE — 12013 RPR F/E/E/N/L/M 2.6-5.0 CM: CPT

## 2024-11-01 PROCEDURE — 90715 TDAP VACCINE 7 YRS/> IM: CPT | Performed by: EMERGENCY MEDICINE

## 2024-11-01 PROCEDURE — 70450 CT HEAD/BRAIN W/O DYE: CPT

## 2024-11-01 PROCEDURE — 99284 EMERGENCY DEPT VISIT MOD MDM: CPT

## 2024-11-01 PROCEDURE — 2500000003 HC RX 250 WO HCPCS: Performed by: EMERGENCY MEDICINE

## 2024-11-01 PROCEDURE — 71045 X-RAY EXAM CHEST 1 VIEW: CPT

## 2024-11-01 PROCEDURE — 6360000002 HC RX W HCPCS: Performed by: EMERGENCY MEDICINE

## 2024-11-01 RX ORDER — LIDOCAINE HYDROCHLORIDE AND EPINEPHRINE 10; 10 MG/ML; UG/ML
20 INJECTION, SOLUTION INFILTRATION; PERINEURAL ONCE
Status: COMPLETED | OUTPATIENT
Start: 2024-11-01 | End: 2024-11-01

## 2024-11-01 RX ORDER — ACETAMINOPHEN 500 MG
500 TABLET ORAL ONCE
Status: COMPLETED | OUTPATIENT
Start: 2024-11-01 | End: 2024-11-01

## 2024-11-01 RX ADMIN — LIDOCAINE HYDROCHLORIDE,EPINEPHRINE BITARTRATE 20 ML: 10; .01 INJECTION, SOLUTION INFILTRATION; PERINEURAL at 08:00

## 2024-11-01 RX ADMIN — ACETAMINOPHEN 500 MG: 500 TABLET ORAL at 07:22

## 2024-11-01 RX ADMIN — TETANUS TOXOID, REDUCED DIPHTHERIA TOXOID AND ACELLULAR PERTUSSIS VACCINE, ADSORBED 0.5 ML: 5; 2.5; 8; 8; 2.5 SUSPENSION INTRAMUSCULAR at 07:27

## 2024-11-01 ASSESSMENT — LIFESTYLE VARIABLES
HOW MANY STANDARD DRINKS CONTAINING ALCOHOL DO YOU HAVE ON A TYPICAL DAY: PATIENT DOES NOT DRINK
HOW OFTEN DO YOU HAVE A DRINK CONTAINING ALCOHOL: NEVER

## 2024-11-01 NOTE — ED PROVIDER NOTES
THE [facility]  EMERGENCY DEPARTMENT ENCOUNTER          ATTENDING PHYSICIAN NOTE       Date of evaluation: 11/1/2024    Chief Complaint     Fall (From [place], here via EMS, nonambulatory, witnessed fall and hit her head, HX dementia, denies blood thinners and LOC,)      History of Present Illness     Radha Guy is a 89 y.o. female who presents with complaint of fall from wheelchair.  She has a history of dementia.  Complains of some pain in her forehead and chin where she has lacerations, denies any pain in the neck back chest abdomen pelvis or extremities.  No reported loss of consciousness.  No chronic anticoagulation noted.  Patient has been at her baseline mental status since the fall.  This appears to have been mechanical fall    ASSESSMENT / PLAN  (MEDICAL DECISION MAKING)     INITIAL VITALS: BP: (!) 141/72, Temp: 98 °F (36.7 °C), Pulse: 87, Respirations: 15, SpO2: 92 %      Radha Guy is a 89 y.o. female who presents with laceration to her chin and forehead as well as some pain in her shoulders.  Radiographs are unremarkable for acute fracture, intracranial hemorrhage, or obvious acute abnormality otherwise.  Laceration was repaired here in the emergency department.  T[place]anus was updated.  In review of her chart, it appears he has a history of dementia and is DNR CC, and subsequently as we appear to have addressed all the at least immediate acute issues, further workup was not pursued at this point.  Will discharge back to her facility.    Is this patient to be included in the SEP-1 core measure? No Exclusion criteria - the patient is NOT to be included for SEP-1 Core Measure due to: Infection is not suspected    Medical Decision Making  Amount and/or Complexity of Data Reviewed  Radiology: ordered.    Risk  OTC drugs.  Prescription drug management.            Clinical Impression     1. Fall, initial encounter    2. Injury of head, initial encounter    3. Forehead laceration, initial

## 2024-11-01 NOTE — ED NOTES
Patient prepared for and ready to be discharged. Patient discharged at this time in no acute distress after verbalizing understanding of discharge instructions. Patient left after receiving After Visit Summary instructions.      Dinesh Angela RN  11/01/24 [id number]

## 2024-11-01 NOTE — DISCHARGE INSTRUCTIONS
Return to emergency department for worsening symptoms or other concerns.  Take Tylenol and ibuprofen for pain.  Keep wounds clean and when possible dry, wash daily with soap and water and apply some antibiotic ointment and a dry bandage daily.  Follow-up in 5 to 7 days for suture removal with your PCP or with the other healthcare provider of your choice.

## 2024-11-01 NOTE — ED NOTES
Attempted to call report to nursing facility no nurse available to take report.      Dinesh Angela RN  11/01/24 6010

## (undated) DEVICE — SUTURE VCRL + SZ 3-0 L18IN ABSRB UD SH 1/2 CIR TAPERCUT NDL VCP864D

## (undated) DEVICE — SOLUTION IV 1000ML 0.9% SOD CHL

## (undated) DEVICE — NEEDLE HYPO 23GA L1.5IN TURQ POLYPR HUB S STL THN WALL IM

## (undated) DEVICE — BIT DRL L330MM DIA4.2MM CALIB 100MM 3 FLUT QUIK CPL

## (undated) DEVICE — BANDAGE COMPR W4INXL15FT BGE E SGL LAYERED CLP CLSR

## (undated) DEVICE — SUTURE VCRL + 1 L27IN ABSRB UD CT-1 L36MM 1/2 CIR TAPR PNT VCP261H

## (undated) DEVICE — BANDAGE COMPR W6INXL4.5YD LTWT E EC SGL LAYERED CLP CLSR

## (undated) DEVICE — 3M™ COBAN™ NL STERILE NON-LATEX SELF-ADHERENT WRAP, 2084S, 4 IN X 5 YD (10 CM X 4,5 M), 18 ROLLS/CASE: Brand: 3M™ COBAN™

## (undated) DEVICE — LOCKING DRILL

## (undated) DEVICE — BIT DRL L L266MM DIA16MM FEM FLX CANN QUIK CPL

## (undated) DEVICE — LEGGINGS, PAIR, CLEAR, STERILE: Brand: MEDLINE

## (undated) DEVICE — PRECISION PIN TAPERED: Brand: GAMMA

## (undated) DEVICE — GLOVE ORANGE PI 8 1/2   MSG9085

## (undated) DEVICE — GUIDEWIRE ORTH L400MM DIA3.2MM FOR TFN

## (undated) DEVICE — PROTECTOR ULN NRV PUR FOAM HK LOOP STRP ANATOMICALLY

## (undated) DEVICE — DRESSING,GAUZE,XEROFORM,CURAD,5"X9",ST: Brand: CURAD

## (undated) DEVICE — BLADE,CARBON-STEEL,10,STRL,DISPOSABLE,TB: Brand: MEDLINE

## (undated) DEVICE — 6619 2 PTNT ISO SYS INCISE AREA&LT;(&GT;&&LT;)&GT;P: Brand: STERI-DRAPE™ IOBAN™ 2

## (undated) DEVICE — SUTURE VCRL + SZ 2-0 L27IN ABSRB CLR CT-1 1/2 CIR TAPERCUT VCP259H

## (undated) DEVICE — GOWN SIRUS NONREIN XL W/TWL: Brand: MEDLINE INDUSTRIES, INC.

## (undated) DEVICE — GLOVE ORTHO 8   MSG9480

## (undated) DEVICE — HIP PINNING: Brand: MEDLINE INDUSTRIES, INC.

## (undated) DEVICE — ONE STEP CONICAL REAMER: Brand: GAMMA

## (undated) DEVICE — SOLUTION IV IRRIG POUR BRL 0.9% SODIUM CHL 2F7124

## (undated) DEVICE — LAG SCREW STEP DRILL: Brand: GAMMA

## (undated) DEVICE — SUTURE VCRL SZ 2-0 L18IN ABSRB UD CT-1 L36MM 1/2 CIR J839D

## (undated) DEVICE — TOWEL,STOP FLAG GOLD N-W: Brand: MEDLINE

## (undated) DEVICE — C-ARM: Brand: UNBRANDED

## (undated) DEVICE — 3M™ TEGADERM™ TRANSPARENT FILM DRESSING FRAME STYLE, 1626W, 4 IN X 4-3/4 IN (10 CM X 12 CM), 50/CT 4CT/CASE: Brand: 3M™ TEGADERM™

## (undated) DEVICE — GLOVE SURG SZ 85 L12IN FNGR THK94MIL STD WHT LTX FREE

## (undated) DEVICE — SPONGE GZ W4XL8IN COT WVN 12 PLY

## (undated) DEVICE — PADDING UNDERCAST W4INXL4YD 100% COT CRIMPED FINISH WBRL II